# Patient Record
Sex: FEMALE | Race: WHITE | NOT HISPANIC OR LATINO | Employment: OTHER | URBAN - METROPOLITAN AREA
[De-identification: names, ages, dates, MRNs, and addresses within clinical notes are randomized per-mention and may not be internally consistent; named-entity substitution may affect disease eponyms.]

---

## 2017-01-11 ENCOUNTER — OFFICE VISIT (OUTPATIENT)
Dept: NEUROLOGY | Facility: CLINIC | Age: 62
End: 2017-01-11

## 2017-01-11 ENCOUNTER — TELEPHONE (OUTPATIENT)
Dept: NEUROLOGY | Facility: CLINIC | Age: 62
End: 2017-01-11

## 2017-01-11 VITALS
DIASTOLIC BLOOD PRESSURE: 82 MMHG | HEIGHT: 65 IN | WEIGHT: 200 LBS | SYSTOLIC BLOOD PRESSURE: 121 MMHG | BODY MASS INDEX: 33.32 KG/M2

## 2017-01-11 DIAGNOSIS — G31.84 MILD COGNITIVE IMPAIRMENT: Primary | ICD-10-CM

## 2017-01-11 PROCEDURE — 99205 OFFICE O/P NEW HI 60 MIN: CPT | Performed by: PSYCHIATRY & NEUROLOGY

## 2017-01-11 RX ORDER — CITALOPRAM 10 MG/1
10 TABLET ORAL DAILY
COMMUNITY
Start: 2016-12-20 | End: 2018-01-09 | Stop reason: SDUPTHER

## 2017-01-11 RX ORDER — LOVASTATIN 40 MG/1
40 TABLET ORAL NIGHTLY
COMMUNITY
Start: 2016-11-09 | End: 2018-04-16 | Stop reason: SDUPTHER

## 2017-01-11 RX ORDER — LANOLIN ALCOHOL/MO/W.PET/CERES
1000 CREAM (GRAM) TOPICAL DAILY
COMMUNITY
End: 2017-08-17

## 2017-01-11 RX ORDER — LEVOTHYROXINE SODIUM 0.05 MG/1
50 TABLET ORAL DAILY
COMMUNITY
End: 2017-08-17

## 2017-01-11 RX ORDER — DONEPEZIL HYDROCHLORIDE 5 MG/1
5 TABLET, FILM COATED ORAL DAILY
Qty: 30 TABLET | Refills: 11 | Status: SHIPPED | OUTPATIENT
Start: 2017-01-11 | End: 2017-02-13

## 2017-01-11 RX ORDER — FAMCICLOVIR 125 MG/1
125 TABLET ORAL DAILY
COMMUNITY
End: 2017-08-17

## 2017-01-11 RX ORDER — LOSARTAN POTASSIUM 100 MG/1
100 TABLET ORAL DAILY
COMMUNITY
End: 2018-02-05 | Stop reason: SDUPTHER

## 2017-01-11 NOTE — TELEPHONE ENCOUNTER
Met  And Mrs. Gomez during their visit with Dr. Jean-Baptiste today.  And Mrs. Gomez have bene  for 44 years, they live in [place] and have 2 grown sons (one in [place] and one in [place]). Each son has 2 kids. 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 They are both active in their [employer],  is  and stays quite busy -he is retired from his career. They said they hoped to have enjoyed group home but both having to have cared for parents and staying busy with [employer] they haven't found much time to spend together. We discussed some setting goals for finding this quality time. I explained my services and that we will work together in an ongoing fashion.

## 2017-01-11 NOTE — PROGRESS NOTES
"Subjective     Cassie Gomez is seen today in consultation at the request of Dr. Enciso for memory loss.    CC: memory loss    History of Present Illness   Cassie Gomez is a 61 y.o. female who comes to clinic today for evaluation of memory impairment. Her  has noted symptoms since approximately mid-2016 marked largely by forgetfulness. This has gradually worsened  over time. Additional associated symptoms have included impairments in orientation  and executive function. She has had no associated  symptoms of BPSD, though she has a history of depression. There are no other associated symptoms or modifying factors. Her family  denies  impairments in ADL's. She is currently residing at home with her .     Prior evaluation has included screening blood work  and an MRI of the brain which were unremarkable .       The following portions of the patient's history were reviewed and updated as appropriate: allergies, current medications, past family history, past medical history, past social history, past surgical history and problem list.    Review of Systems   Constitutional: Negative.    Respiratory: Negative.    Cardiovascular: Negative.    Gastrointestinal: Negative.    Genitourinary: Negative.    Psychiatric/Behavioral: Negative.    All other systems reviewed and are negative.      Objective   General appearance today is normal.   Peripheral pulses were present and symmetric.   The ophthalmoscopic exam today is unremarkable. The discs and posterior elements are unremarkable.    Visit Vitals   • /82   • Ht 65\" (165.1 cm)   • Wt 200 lb (90.7 kg)   • BMI 33.28 kg/m2       Physical Exam   Neurological: She has normal strength. She has a normal Finger-Nose-Finger Test. Gait normal.   Reflex Scores:       Tricep reflexes are 2+ on the right side and 2+ on the left side.       Bicep reflexes are 2+ on the right side and 2+ on the left side.       Brachioradialis reflexes are 2+ on the right side " and 2+ on the left side.       Patellar reflexes are 2+ on the right side and 2+ on the left side.       Achilles reflexes are 2+ on the right side and 2+ on the left side.  Psychiatric: Her speech is normal.        Neurologic Exam     Mental Status   Oriented to person.   Disoriented to place.   Disoriented to time.   Registration: recalls 3 of 3 objects. Recall at 5 minutes: recalls 1 of 3 objects. Follows 3 step commands.   Attention: normal. Concentration: normal.   Speech: speech is normal   Level of consciousness: alert  Knowledge: good.   Able to name object. Able to read. Able to repeat. Able to write. Normal comprehension.     Cranial Nerves   Cranial nerves II through XII intact.     Motor Exam   Muscle bulk: normal  Overall muscle tone: normal    Strength   Strength 5/5 throughout.     Sensory Exam   Light touch normal.     Gait, Coordination, and Reflexes     Gait  Gait: normal    Coordination   Finger to nose coordination: normal    Reflexes   Right brachioradialis: 2+  Left brachioradialis: 2+  Right biceps: 2+  Left biceps: 2+  Right triceps: 2+  Left triceps: 2+  Right patellar: 2+  Left patellar: 2+  Right achilles: 2+  Left achilles: 2+  Right : 2+  Left : 2+      MMSE=20      Assessment/Plan   Cassie was seen today for memory loss.    Diagnoses and all orders for this visit:    Mild cognitive impairment    Other orders  -     donepezil (ARICEPT) 5 MG tablet; Take 1 tablet by mouth Daily.        DISCUSSION/SUMMARY    Cassie Gomez comes to clinic today for evaluation of memory impairment. Her history and examination, including bedside cognitive testing are most consistent with Mild Cognitive Impairment (MCI) , which was discussed. Her examination would suggest an underlying dementia, though her history is not compelling for ADL impairment, and I believe her bedside testing may underestimate her true ability. Her laboratory and radiological workup is complete unrevealing. After  discussing potential treatment options, it was elected to add  donepezil. She will then follow up in 1 month, or sooner if needed.     As part of this visit I reviewed prior lab results, reviewed radiology results, obtained additional history from the family which is incorporated in the HPI and records from Dr. Enciso. Please see above for additional details.

## 2017-01-11 NOTE — LETTER
January 11, 2017     Erick Enciso MD  [address] [place] [id number]    Patient: Cassie Gomez   YOB: 1955   Date of Visit: 1/11/2017       Dear Dr. Zaria MD:    Thank you for referring Cassie Gomez to me for evaluation. Below are the relevant portions of my assessment and plan of care.      Cassie was seen today for memory loss.    Diagnoses and all orders for this visit:    Mild cognitive impairment    Other orders  -     donepezil (ARICEPT) 5 MG tablet; Take 1 tablet by mouth Daily.        DISCUSSION/SUMMARY    Cassie Gomez comes to clinic today for evaluation of memory impairment. Her history and examination, including bedside cognitive testing are most consistent with Mild Cognitive Impairment (MCI) , which was discussed. Her examination would suggest an underlying dementia, though her history is not compelling for ADL impairment, and I believe her bedside testing may underestimate her true ability. Her laboratory and radiological workup is complete unrevealing. After discussing potential treatment options, it was elected to add  donepezil. She will then follow up in 1 month, or sooner if needed.     As part of this visit I reviewed prior lab results, reviewed radiology results, obtained additional history from the family which is incorporated in the HPI and records from Dr. Enciso. Please see above for additional details.                If you have questions, please do not hesitate to call me. I look forward to following Cassie along with you.         Sincerely,        Lew Jean-Baptiste MD        CC: No Recipients

## 2017-02-13 ENCOUNTER — OFFICE VISIT (OUTPATIENT)
Dept: NEUROLOGY | Facility: CLINIC | Age: 62
End: 2017-02-13

## 2017-02-13 VITALS
BODY MASS INDEX: 31.39 KG/M2 | SYSTOLIC BLOOD PRESSURE: 138 MMHG | DIASTOLIC BLOOD PRESSURE: 80 MMHG | WEIGHT: 200 LBS | HEIGHT: 67 IN

## 2017-02-13 DIAGNOSIS — G31.84 MILD COGNITIVE IMPAIRMENT: Primary | ICD-10-CM

## 2017-02-13 PROCEDURE — 99213 OFFICE O/P EST LOW 20 MIN: CPT | Performed by: PHYSICIAN ASSISTANT

## 2017-02-13 RX ORDER — MEMANTINE HYDROCHLORIDE 10 MG/1
10 TABLET ORAL 2 TIMES DAILY
Qty: 60 TABLET | Refills: 11 | Status: SHIPPED | OUTPATIENT
Start: 2017-02-13 | End: 2018-01-09 | Stop reason: SDUPTHER

## 2017-02-13 RX ORDER — DONEPEZIL HYDROCHLORIDE 10 MG/1
10 TABLET, FILM COATED ORAL DAILY
Qty: 30 TABLET | Refills: 11 | Status: SHIPPED | OUTPATIENT
Start: 2017-02-13 | End: 2018-01-09 | Stop reason: SDUPTHER

## 2017-02-13 RX ORDER — MEMANTINE HYDROCHLORIDE 5 MG-10 MG
KIT ORAL
Qty: 1 PACKAGE | Refills: 0 | Status: SHIPPED | OUTPATIENT
Start: 2017-02-13 | End: 2017-07-06

## 2017-02-13 NOTE — PROGRESS NOTES
"Subjective     History of Present Illness   Cassie Gomez is a 62 y.o. female who returns to clinic today for evaluation of cognitive impairment. Her  has noted symptoms since approximately mid-2016 marked largely by forgetfulness. This has gradually worsened  over time. Additional associated symptoms have included impairments in orientation and executive function. She has had no associated  symptoms of BPSD, though she has a history of depression. There are no other associated symptoms or modifying factors. Her family denies  impairments in ADL's. She is currently residing at home with her .     Prior evaluation has included screening blood work  and an MRI of the brain which were unremarkable . She is currently taking donepezil 5mg daily.     Since her last visit in 1/17, she and her family feels that her cognition has slightly improved.       The following portions of the patient's history were reviewed and updated as appropriate: allergies, current medications, past family history, past medical history, past social history, past surgical history and problem list.    Review of Systems   Constitutional: Negative.    HENT: Negative.    Eyes: Negative.    Respiratory: Negative.    Cardiovascular: Negative.    Gastrointestinal: Negative.    Endocrine: Negative.    Genitourinary: Negative.    Musculoskeletal: Negative.    Skin: Negative.    Allergic/Immunologic: Negative.    Neurological:        As noted in HPI   Hematological: Negative.    Psychiatric/Behavioral: Negative.        Objective     Visit Vitals   • /80   • Ht 67\" (170.2 cm)   • Wt 200 lb (90.7 kg)   • BMI 31.32 kg/m2       General appearance today is normal.       Physical Exam   Constitutional: She is oriented to person, place, and time.   Neurological: She is oriented to person, place, and time. She has normal strength. She has a normal Finger-Nose-Finger Test.   Psychiatric: Her speech is normal.        Neurologic Exam "     Mental Status   Oriented to person, place, and time.   Registration: recalls 3 of 3 objects. Recall at 5 minutes: recalls 2 of 3 objects. Follows 3 step commands.   Attention: 2/5 sequencing.   Speech: speech is normal   Level of consciousness: alert  Able to name object. Able to read. Able to repeat. Able to write. Normal comprehension.     Cranial Nerves   Cranial nerves II through XII intact.     Motor Exam   Muscle bulk: normal  Overall muscle tone: normal    Strength   Strength 5/5 throughout.     Sensory Exam   Light touch normal.     Gait, Coordination, and Reflexes     Coordination   Finger to nose coordination: normal    Tremor   Resting tremor: absent        Results  MMSE=25 (20 in 1/17)       Assessment/Plan   Cassie was seen today for neurologic problem.    Diagnoses and all orders for this visit:    Mild cognitive impairment    Other orders  -     donepezil (ARICEPT) 10 MG tablet; Take 1 tablet by mouth Daily.  -     memantine (NAMENDA TITRATION MAYO) 5 (28)-10 (21) MG tablet pack; Follow package directions.  -     memantine (NAMENDA) 10 MG tablet; Take 1 tablet by mouth 2 (Two) Times a Day.          Discussion/Summary   Cassie Gomez returns to clinic today for evaluation of Mild Cognitive Impairment (MCI) . I again reviewed her current status and treatment options. After discussing potential treatment options, it was elected to increase  donepezil to 10mg daily and add memantine. I also recommended cognitive rehabilitation, which she will consider in the future. She will then follow up in 3 months , or sooner if needed.       I spent 15 minutes with the patient and family. I spent 80 percent of this time counseling and discussing diagnosis, prognosis, diagnostic testing, evaluation, current status, treatment options, management and clinical trials.    As part of this visit I obtained additional history from the family which is incorporated in the HPI.    Additionally, I gave the patient  educational materials including an overview packet and disease specific educational materials     Lavinia Randhawa PA-C

## 2017-02-15 ENCOUNTER — DOCUMENTATION (OUTPATIENT)
Dept: NEUROLOGY | Facility: CLINIC | Age: 62
End: 2017-02-15

## 2017-02-15 NOTE — PROGRESS NOTES
I called and spoke with pharmacist on file and he paid $24.00 for Donepezil, $7.15 for Memantine Titration PK and $7.15 for the Memantine, so I spoke with Rosy about it and she is going to speak with the pt's children

## 2017-05-17 ENCOUNTER — OFFICE VISIT (OUTPATIENT)
Dept: NEUROLOGY | Facility: CLINIC | Age: 62
End: 2017-05-17

## 2017-05-17 VITALS
SYSTOLIC BLOOD PRESSURE: 122 MMHG | WEIGHT: 190 LBS | BODY MASS INDEX: 28.79 KG/M2 | DIASTOLIC BLOOD PRESSURE: 80 MMHG | HEIGHT: 68 IN

## 2017-05-17 DIAGNOSIS — G31.84 MILD COGNITIVE IMPAIRMENT: Primary | ICD-10-CM

## 2017-05-17 PROCEDURE — 99214 OFFICE O/P EST MOD 30 MIN: CPT | Performed by: PSYCHIATRY & NEUROLOGY

## 2017-07-06 ENCOUNTER — OFFICE VISIT (OUTPATIENT)
Dept: FAMILY MEDICINE CLINIC | Facility: CLINIC | Age: 62
End: 2017-07-06

## 2017-07-06 VITALS
HEART RATE: 78 BPM | OXYGEN SATURATION: 98 % | DIASTOLIC BLOOD PRESSURE: 80 MMHG | BODY MASS INDEX: 29.1 KG/M2 | SYSTOLIC BLOOD PRESSURE: 118 MMHG | WEIGHT: 192 LBS | HEIGHT: 68 IN

## 2017-07-06 DIAGNOSIS — I10 ESSENTIAL HYPERTENSION: ICD-10-CM

## 2017-07-06 DIAGNOSIS — E78.49 OTHER HYPERLIPIDEMIA: ICD-10-CM

## 2017-07-06 DIAGNOSIS — E01.0 THYROMEGALY: ICD-10-CM

## 2017-07-06 DIAGNOSIS — E03.9 ACQUIRED HYPOTHYROIDISM: ICD-10-CM

## 2017-07-06 DIAGNOSIS — E11.9 TYPE 2 DIABETES MELLITUS WITHOUT COMPLICATION, WITHOUT LONG-TERM CURRENT USE OF INSULIN (HCC): Primary | ICD-10-CM

## 2017-07-06 PROBLEM — E78.5 HYPERLIPIDEMIA: Status: ACTIVE | Noted: 2017-07-06

## 2017-07-06 PROCEDURE — 99204 OFFICE O/P NEW MOD 45 MIN: CPT | Performed by: FAMILY MEDICINE

## 2017-07-06 NOTE — PROGRESS NOTES
Subjective   Cassie Gomez is a 62 y.o. female.     History of Present Illness  Ms. Gomez presents today as a new pt to establish care. She was previously followed by Dr. Buck at [facility]. He has retired and she is in need of new PCP. She has several chronic medical conditions for which she needs f/u.    Diabetes Mellitus Type II, Follow-up: Patient here for follow-up of Type 2 diabetes mellitus.  Current symptoms/problems include weight loss and have been stable.     Known diabetic complications: none  Cardiovascular risk factors: diabetes mellitus, dyslipidemia, hypertension and sedentary lifestyle  Current diabetic medications include oral agent (monotherapy): metformin.     Eye exam current (within one year): yes  Weight trend: fluctuating a bit  Prior visit with dietician: yes -    Current diet: well balanced  Current exercise: walking    Current monitoring regimen: home blood tests - daily  Home blood sugar records: <120 fasting  Any episodes of hypoglycemia? no    Is She on ACE inhibitor or angiotensin II receptor blocker?   Yes losartan (Cozaar)    Hypertension: Patient here for follow-up of elevated blood pressure. She is irregularly exercising and is fairly adherent to low salt diet.  Blood pressure is well controlled at home. Cardiac symptoms none. Patient denies chest pain, claudication, cough, dyspnea, exertional chest pressure/discomfort, irregular heart beat, lower extremity edema, palpitations and syncope.  Cardiovascular risk factors: as above. Use of agents associated with hypertension: thyroid hormones. History of target organ damage: none. Taking medication as rx'd.    Hyperlipidemia: Patient presents with hyperlipidemia.  She was tested because of comorbidities.  Her last labs unknown at this time. Cardiac symptoms as above. No new focal neuro symptoms.  There is a family history of hyperlipidemia. There is a family history of ischemic heart disease. Currently on statin and  "tolerating well.    Hypothyroidism: Patient presents for evaluation of thyroid function. Symptoms have previously consisted of fatigue, feeling cold and cold intolerance, anxiousness, change in skin,  nails, or hair, depression. Symptoms have present for several years. The symptoms were moderate.  The problem has been controlled.  Previous thyroid studies include TSH, triiodothyronine free and free thyroxine. The hypothyroidism is acquired.    She is followed by Dr. Jean-Baptiste in neurology for \"mild cognitive impairment\". She is currently on nemantine and donepezil.     The following portions of the patient's history were reviewed and updated as appropriate: allergies, current medications, past family history, past medical history, past social history, past surgical history and problem list.    Review of Systems   Constitutional: Positive for unexpected weight change. Negative for appetite change, fatigue and fever.   HENT: Negative for congestion, ear pain, hearing loss, nosebleeds, rhinorrhea, sneezing, sore throat, tinnitus and trouble swallowing.    Eyes: Negative for pain, discharge, redness and visual disturbance.   Respiratory: Negative for cough, chest tightness, shortness of breath and wheezing.    Cardiovascular: Negative for chest pain, palpitations and leg swelling.   Gastrointestinal: Negative for abdominal pain, blood in stool, constipation, diarrhea, nausea and vomiting.   Endocrine: Negative for cold intolerance, heat intolerance, polydipsia and polyuria.   Genitourinary: Negative for dysuria, flank pain, frequency, hematuria and urgency.   Musculoskeletal: Negative for arthralgias, back pain, joint swelling, myalgias and neck pain.   Skin: Negative for rash and wound.   Allergic/Immunologic: Negative for food allergies.   Neurological: Negative for dizziness, tremors, seizures, syncope, speech difficulty, weakness, numbness and headaches.   Hematological: Negative for adenopathy. Does not bruise/bleed " easily.   Psychiatric/Behavioral: Negative for confusion, dysphoric mood, sleep disturbance and suicidal ideas. The patient is not nervous/anxious.        Objective    Vitals:    07/06/17 1001   BP: 118/80   Pulse: 78   SpO2: 98%     Body mass index is 29.19 kg/(m^2).  Last 2 weights    07/06/17  1001   Weight: 192 lb (87.1 kg)       Physical Exam   Constitutional: She is oriented to person, place, and time. She appears well-developed and well-nourished. She is cooperative. She does not appear ill. No distress.   HENT:   Head: Normocephalic and atraumatic.   Right Ear: Tympanic membrane, external ear and ear canal normal.   Left Ear: Tympanic membrane, external ear and ear canal normal.   Nose: Nose normal. No mucosal edema or rhinorrhea.   Mouth/Throat: Oropharynx is clear and moist and mucous membranes are normal. No oral lesions. No posterior oropharyngeal erythema. No tonsillar exudate.   Eyes: Conjunctivae, EOM and lids are normal.   Neck: Phonation normal. Neck supple. Normal carotid pulses present. Carotid bruit is not present. Thyromegaly (right lobe larger than left) present.   Cardiovascular: Normal rate, regular rhythm, S1 normal, S2 normal and intact distal pulses.    Pulmonary/Chest: Effort normal and breath sounds normal.   Abdominal: Soft. Bowel sounds are normal. She exhibits no distension and no mass. There is no hepatosplenomegaly. There is no tenderness.   Musculoskeletal: Normal range of motion. She exhibits no edema, tenderness or deformity.       Vascular Status -  Her exam exhibits no right foot edema. Her exam exhibits no left foot edema.  Lymphadenopathy:     She has no cervical adenopathy.        Right: No supraclavicular adenopathy present.        Left: No supraclavicular adenopathy present.   Neurological: She is alert and oriented to person, place, and time. She has normal strength. She displays no tremor. No cranial nerve deficit or sensory deficit. Gait normal.   Skin: Skin is warm and  dry. No ecchymosis and no rash noted. There is pallor.   Psychiatric: Her speech is normal and behavior is normal. Thought content normal. Her affect is blunt.   Nursing note and vitals reviewed.      Assessment/Plan   Cassie was seen today for establish care.    Diagnoses and all orders for this visit:    Type 2 diabetes mellitus without complication, without long-term current use of insulin  Comments:  DM historically controlled. Continue current meds. Reassess A1c and adjust tx as indicated. Diabetic appropriate diet and regular exercise advised.  Orders:  -     CBC (No Diff)  -     Comprehensive Metabolic Panel  -     Hemoglobin A1c    Acquired hypothyroidism  Comments:  Unknown status. Check TSH and adjust replacement as indicated.  Orders:  -     Comprehensive Metabolic Panel  -     TSH  -     T4, Free    Essential hypertension  Comments:  BP controlled. On ARB. Low salt diet and regular exercise advised.  Orders:  -     CBC (No Diff)  -     Comprehensive Metabolic Panel    Other hyperlipidemia  Comments:  Unknown status and pt not fasting today. Continue statin.   Orders:  -     Comprehensive Metabolic Panel    Thyromegaly  Comments:  Review record. Asseses function. May need thyroid US. Denies dysphagia or sore throat.    I will contact patient regarding test results and provide instructions regarding any necessary changes in plan of care.  Records requested from previous primary provider as well as any consulting physician, admitting hospitals, etc. Further recommendations pending review.  Patient was encouraged to keep me informed of any acute changes, or any new concerning symptoms.  Routine f/u in 3 month, sooner as needed/instructed.  Patient voiced understanding of all instructions and denied further questions.

## 2017-07-07 LAB
ALBUMIN SERPL-MCNC: 4.8 G/DL (ref 3.5–5)
ALBUMIN/GLOB SERPL: 1.6 G/DL (ref 1–2)
ALP SERPL-CCNC: 104 U/L (ref 38–126)
ALT SERPL-CCNC: 23 U/L (ref 13–69)
AST SERPL-CCNC: 24 U/L (ref 15–46)
BILIRUB SERPL-MCNC: 0.5 MG/DL (ref 0.2–1.3)
BUN SERPL-MCNC: 12 MG/DL (ref 7–20)
BUN/CREAT SERPL: 15 (ref 7.1–23.5)
CALCIUM SERPL-MCNC: 10.4 MG/DL (ref 8.4–10.2)
CHLORIDE SERPL-SCNC: 101 MMOL/L (ref 98–107)
CO2 SERPL-SCNC: 26 MMOL/L (ref 26–30)
CREAT SERPL-MCNC: 0.8 MG/DL (ref 0.6–1.3)
ERYTHROCYTE [DISTWIDTH] IN BLOOD BY AUTOMATED COUNT: 15 % (ref 11.5–14.5)
GLOBULIN SER CALC-MCNC: 3 GM/DL
GLUCOSE SERPL-MCNC: 104 MG/DL (ref 74–98)
HBA1C MFR BLD: 6.5 %
HCT VFR BLD AUTO: 46.7 % (ref 37–47)
HGB BLD-MCNC: 14.2 G/DL (ref 12–16)
MCH RBC QN AUTO: 27.8 PG (ref 27–31)
MCHC RBC AUTO-ENTMCNC: 30.4 G/DL (ref 30–37)
MCV RBC AUTO: 91.4 FL (ref 81–99)
PLATELET # BLD AUTO: 360 10*3/MM3 (ref 130–400)
POTASSIUM SERPL-SCNC: 4.9 MMOL/L (ref 3.5–5.1)
PROT SERPL-MCNC: 7.8 G/DL (ref 6.3–8.2)
RBC # BLD AUTO: 5.11 10*6/MM3 (ref 4.2–5.4)
SODIUM SERPL-SCNC: 143 MMOL/L (ref 137–145)
T4 FREE SERPL-MCNC: 1.17 NG/DL (ref 0.78–2.19)
TSH SERPL DL<=0.005 MIU/L-ACNC: 1.36 MIU/ML (ref 0.47–4.68)
WBC # BLD AUTO: 10.73 10*3/MM3 (ref 4.8–10.8)

## 2017-08-17 ENCOUNTER — OFFICE VISIT (OUTPATIENT)
Dept: NEUROLOGY | Facility: CLINIC | Age: 62
End: 2017-08-17

## 2017-08-17 VITALS — HEIGHT: 68 IN | WEIGHT: 192 LBS | BODY MASS INDEX: 29.1 KG/M2

## 2017-08-17 DIAGNOSIS — G31.84 MILD COGNITIVE IMPAIRMENT: Primary | ICD-10-CM

## 2017-08-17 PROCEDURE — 99213 OFFICE O/P EST LOW 20 MIN: CPT | Performed by: PHYSICIAN ASSISTANT

## 2017-08-17 NOTE — PROGRESS NOTES
"Subjective     History of Present Illness   Cassie Gomez is a 62 y.o. female who returns to clinic today for evaluation of Mild Cognitive Impairment (MCI) . Her  has noted symptoms since approximately mid-2016 marked largely by forgetfulness. This has gradually worsened over time. Additional associated symptoms have included impairments in orientation and executive function. She has had no associated symptoms of BPSD, though she has a history of depression. There are no other associated symptoms or modifying factors. Her family denies impairments in ADL's. She is currently residing at home with her .      Prior evaluation has included screening blood work  and an MRI of the brain which were unremarkable. She is currently taking donepezil 10 mg and memantine.    Since her last visit in 5/17, she feels essentially unchanged cognitively and her family agrees.       The following portions of the patient's history were reviewed and updated as appropriate: allergies, current medications, past family history, past medical history, past social history, past surgical history and problem list.    Review of Systems   Constitutional: Negative.    HENT: Negative.    Eyes: Negative.    Respiratory: Negative.    Cardiovascular: Negative.    Gastrointestinal: Negative.    Endocrine: Negative.    Genitourinary: Negative.    Musculoskeletal: Negative.    Skin: Negative.    Allergic/Immunologic: Negative.    Neurological:        As noted in HPI   Hematological: Negative.    Psychiatric/Behavioral: Negative.        Objective     Ht 68\" (172.7 cm)  Wt 192 lb (87.1 kg)  BMI 29.19 kg/m2    General appearance today is normal.       Physical Exam   Neurological: She has normal strength. She has a normal Finger-Nose-Finger Test.   Psychiatric: Her speech is normal.        Neurologic Exam     Mental Status   Oriented to place.   Disoriented to date and day. Oriented to year, month and season.   Registration: recalls 3 of " 3 objects. Recall at 5 minutes: recalls 2 of 3 objects. Follows 3 step commands.   Attention: 3/5 sequencing.   Speech: speech is normal   Level of consciousness: alert  Able to name object. Able to read. Able to repeat. Able to write. Normal comprehension.     Cranial Nerves   Cranial nerves II through XII intact.     Motor Exam   Muscle bulk: normal  Overall muscle tone: normal    Strength   Strength 5/5 throughout.     Sensory Exam   Light touch normal.     Gait, Coordination, and Reflexes     Coordination   Finger to nose coordination: normal    Tremor   Resting tremor: absent        Results  MMSE=24 (27 in 5/17) (20 in 1/17)       Assessment/Plan   Cassie was seen today for memory loss.    Diagnoses and all orders for this visit:    Mild cognitive impairment          Discussion/Summary   Cassie Gomez returns to clinic today for evaluation of Mild Cognitive Impairment (MCI) . I again reviewed her current status and treatment options. After discussing potential treatment options, it was elected to continue on  donepezil and memantine unchanged. I recommended cognitive rehabilitation, which she will consider in the future. I also discussed potential clinical trials. She will then follow up in 6 months, or sooner if needed.       I spent 15 minutes out of 20 minutes face to face with the patient and family and discussing diagnosis, evaluation, current status, treatment options and management.    As part of this visit I obtained additional history from the family which is incorporated in the HPI.      Lavinia Randhawa PA-C

## 2017-09-25 PROBLEM — E53.8 VITAMIN B 12 DEFICIENCY: Status: ACTIVE | Noted: 2017-09-25

## 2017-09-25 PROBLEM — K58.2 IRRITABLE BOWEL SYNDROME WITH BOTH CONSTIPATION AND DIARRHEA: Status: ACTIVE | Noted: 2017-09-25

## 2017-10-04 ENCOUNTER — OFFICE VISIT (OUTPATIENT)
Dept: FAMILY MEDICINE CLINIC | Facility: CLINIC | Age: 62
End: 2017-10-04

## 2017-10-04 VITALS
SYSTOLIC BLOOD PRESSURE: 112 MMHG | WEIGHT: 203 LBS | DIASTOLIC BLOOD PRESSURE: 80 MMHG | HEIGHT: 68 IN | OXYGEN SATURATION: 98 % | BODY MASS INDEX: 30.77 KG/M2 | HEART RATE: 79 BPM

## 2017-10-04 DIAGNOSIS — E78.49 OTHER HYPERLIPIDEMIA: ICD-10-CM

## 2017-10-04 DIAGNOSIS — E03.9 ACQUIRED HYPOTHYROIDISM: ICD-10-CM

## 2017-10-04 DIAGNOSIS — E11.9 DIABETES MELLITUS, STABLE (HCC): Primary | ICD-10-CM

## 2017-10-04 DIAGNOSIS — E55.9 VITAMIN D DEFICIENCY: ICD-10-CM

## 2017-10-04 DIAGNOSIS — E53.8 VITAMIN B 12 DEFICIENCY: ICD-10-CM

## 2017-10-04 DIAGNOSIS — K58.2 IRRITABLE BOWEL SYNDROME WITH BOTH CONSTIPATION AND DIARRHEA: ICD-10-CM

## 2017-10-04 LAB — GLUCOSE BLDC GLUCOMTR-MCNC: 203 MG/DL (ref 70–130)

## 2017-10-04 PROCEDURE — 82962 GLUCOSE BLOOD TEST: CPT | Performed by: FAMILY MEDICINE

## 2017-10-04 PROCEDURE — 99214 OFFICE O/P EST MOD 30 MIN: CPT | Performed by: FAMILY MEDICINE

## 2017-10-04 NOTE — PROGRESS NOTES
Subjective   Cassie Gomez is a 62 y.o. female.     History of Present Illness  Ms. Gomez presents today for routine f/u.     Diabetes Mellitus Type II, Follow-up: Patient here for follow-up of Type 2 diabetes mellitus.  Current symptoms/problems include weight loss and have been stable.      Known diabetic complications: none  Cardiovascular risk factors: diabetes mellitus, dyslipidemia, hypertension and sedentary lifestyle  Current diabetic medications include oral agent (monotherapy): metformin.      Eye exam current (within one year): yes  Weight trend: fluctuating a bit  Prior visit with dietician: yes -    Current diet: well balanced  Current exercise: walking     Current monitoring regimen: home blood tests - rarely  Home blood sugar records: unknown  Any episodes of hypoglycemia? no     Is She on ACE inhibitor or angiotensin II receptor blocker?   Yes losartan (Cozaar)     Hypertension: Patient here for follow-up of elevated blood pressure. She is irregularly exercising and is fairly adherent to low salt diet.  Blood pressure not checked at home. Cardiac symptoms none. Patient denies chest pain, claudication, cough, dyspnea, exertional chest pressure/discomfort, irregular heart beat, lower extremity edema, palpitations and syncope.  Cardiovascular risk factors: as above. Use of agents associated with hypertension: thyroid hormones. History of target organ damage: none. Taking medication as rx'd. Notably pt does not recall being told she has HTN and was not aware of this dx. She has been taking her Losartan.     Hyperlipidemia: Patient presents with hyperlipidemia.  She was tested because of comorbidities.  Her last labs unknown at this time. Cardiac symptoms as above. No new focal neuro symptoms.  There is a family history of hyperlipidemia. There is a family history of ischemic heart disease. Currently on statin and tolerating well.     Hypothyroidism: Patient presents for evaluation of thyroid function.  "Symptoms have previously consisted of fatigue, feeling cold and cold intolerance, anxiousness, change in skin,  nails, or hair, depression. Symptoms have present for several years. The symptoms were moderate.  The problem has been controlled.  Previous thyroid studies include TSH, triiodothyronine free and free thyroxine. The hypothyroidism is acquired.     She is followed by Dr. Jean-Baptiste in neurology for \"mild cognitive impairment\". She is currently on nemantine and donepezil. Has f/u after first of year.    The following portions of the patient's history were reviewed and updated as appropriate: allergies, current medications, past family history, past medical history, past social history, past surgical history and problem list.    Review of Systems   Constitutional: Positive for unexpected weight change. Negative for appetite change, fatigue and fever.   HENT: Negative for congestion, ear pain, hearing loss, nosebleeds, rhinorrhea, sneezing, sore throat, tinnitus and trouble swallowing.    Eyes: Negative for pain, discharge, redness and visual disturbance.   Respiratory: Negative for cough, chest tightness, shortness of breath and wheezing.    Cardiovascular: Negative for chest pain, palpitations and leg swelling.   Gastrointestinal: Negative for abdominal pain, blood in stool, constipation, diarrhea, nausea and vomiting.   Endocrine: Negative for cold intolerance, heat intolerance, polydipsia and polyuria.   Genitourinary: Negative for dysuria, flank pain, frequency, hematuria and urgency.   Musculoskeletal: Negative for arthralgias, back pain, joint swelling, myalgias and neck pain.   Skin: Negative for rash and wound.   Neurological: Negative for dizziness, tremors, seizures, syncope, speech difficulty, weakness, numbness and headaches.   Hematological: Negative for adenopathy. Does not bruise/bleed easily.   Psychiatric/Behavioral: Negative for confusion, dysphoric mood, sleep disturbance and suicidal ideas. " The patient is not nervous/anxious.        Objective    Vitals:    10/04/17 0915   BP: 112/80   Pulse: 79   SpO2: 98%     Body mass index is 30.87 kg/(m^2).  Last 2 weights    10/04/17  0915   Weight: 203 lb (92.1 kg)     Physical Exam   Constitutional: She is oriented to person, place, and time. She appears well-developed and well-nourished. She is cooperative. She does not appear ill. No distress.   HENT:   Head: Normocephalic and atraumatic.   Mouth/Throat: Mucous membranes are normal. Mucous membranes are not dry.   Eyes: Conjunctivae and lids are normal.   Neck: Phonation normal. Neck supple. Normal carotid pulses present. Thyromegaly (right lobe larger than left) present.   Cardiovascular: Normal rate, regular rhythm, S1 normal, S2 normal and intact distal pulses.    Pulmonary/Chest: Effort normal and breath sounds normal.   Musculoskeletal: She exhibits no edema, tenderness or deformity.       Vascular Status -  Her exam exhibits no right foot edema. Her exam exhibits no left foot edema.  Neurological: She is alert and oriented to person, place, and time. She has normal strength. She displays no tremor. No cranial nerve deficit or sensory deficit. Gait normal.   Skin: Skin is warm and dry. No ecchymosis and no rash noted. There is pallor.   Psychiatric: Her speech is normal. Thought content normal. Her affect is blunt.   Nursing note and vitals reviewed.    Recent Results (from the past 2184 hour(s))   CBC (No Diff)    Collection Time: 07/06/17 10:45 AM   Result Value Ref Range    WBC 10.73 4.80 - 10.80 10*3/mm3    RBC 5.11 4.20 - 5.40 10*6/mm3    Hemoglobin 14.2 12.0 - 16.0 g/dL    Hematocrit 46.7 37.0 - 47.0 %    MCV 91.4 81.0 - 99.0 fL    MCH 27.8 27.0 - 31.0 pg    MCHC 30.4 30.0 - 37.0 g/dL    RDW 15.0 (H) 11.5 - 14.5 %    Platelets 360 130 - 400 10*3/mm3   Comprehensive Metabolic Panel    Collection Time: 07/06/17 10:45 AM   Result Value Ref Range    Glucose 104 (H) 74 - 98 mg/dL    BUN 12 7 - 20 mg/dL     Creatinine 0.80 0.60 - 1.30 mg/dL    eGFR Non African Am 72 >60 mL/min/1.73    eGFR African Am 88 >60 mL/min/1.73    BUN/Creatinine Ratio 15.0 7.1 - 23.5    Sodium 143 137 - 145 mmol/L    Potassium 4.9 3.5 - 5.1 mmol/L    Chloride 101 98 - 107 mmol/L    Total CO2 26.0 26.0 - 30.0 mmol/L    Calcium 10.4 (H) 8.4 - 10.2 mg/dL    Total Protein 7.8 6.3 - 8.2 g/dL    Albumin 4.80 3.50 - 5.00 g/dL    Globulin 3.0 gm/dL    A/G Ratio 1.6 1.0 - 2.0 g/dL    Total Bilirubin 0.5 0.2 - 1.3 mg/dL    Alkaline Phosphatase 104 38 - 126 U/L    AST (SGOT) 24 15 - 46 U/L    ALT (SGPT) 23 13 - 69 U/L   Hemoglobin A1c    Collection Time: 07/06/17 10:45 AM   Result Value Ref Range    Hemoglobin A1C 6.50 %   TSH    Collection Time: 07/06/17 10:45 AM   Result Value Ref Range    TSH 1.360 0.470 - 4.680 mIU/mL   T4, Free    Collection Time: 07/06/17 10:45 AM   Result Value Ref Range    Free T4 1.17 0.78 - 2.19 ng/dL   POC Glucose Fingerstick    Collection Time: 10/04/17  9:34 AM   Result Value Ref Range    Glucose 203 (A) 70 - 130 mg/dL     Assessment/Plan   Cassie was seen today for diabetes, hyperlipidemia and hypothyroidism.    Diagnoses and all orders for this visit:    Diabetes mellitus, stable  -     POC Glucose Fingerstick  -     Microalbumin / Creatinine Urine Ratio - Urine, Clean Catch  -     Comprehensive Metabolic Panel; Future    Other hyperlipidemia  -     Comprehensive Metabolic Panel; Future  -     Lipid Panel; Future    Irritable bowel syndrome with both constipation and diarrhea    Vitamin B 12 deficiency  -     Vitamin B12; Future    Acquired hypothyroidism    Vitamin D deficiency  -     Vitamin D 25 Hydroxy; Future    Other orders  -     Tdap (BOOSTRIX) 5-2.5-18.5 LF-MCG/0.5 injection; Inject 0.5 mL into the shoulder, thigh, or buttocks 1 (One) Time for 1 dose.    NIDDM historically controlled. As it has not quite been 12 weeks since last A1c will wait until prior to next visit.  HLP of unknown status; currently on  mevacor.  HTN according to med list/record but not according to pt. Wean down to 50 mg daily, drop by for BP recheck in 4 weeks. And adjust accordingly.  Appears euthyroid other than weight gain. Continue current dose.  Pt advised to eat a heart healthy diet and get regular aerobic exercise.  Patient was encouraged to keep me informed of any acute changes, or any new concerning symptoms.  Routine f/u in 3-4 months with fasting labs prior.  FU sooner as needed.  Pt is aware of reasons to seek emergent care.  Patient voiced understanding of all instructions and denied further questions.

## 2017-10-05 LAB
ALBUMIN/CREAT UR: 3.9 MG/G CREAT (ref 0–30)
CREAT UR-MCNC: 139.4 MG/DL
MICROALBUMIN UR-MCNC: 5.4 UG/ML

## 2017-10-09 ENCOUNTER — RESULTS ENCOUNTER (OUTPATIENT)
Dept: FAMILY MEDICINE CLINIC | Facility: CLINIC | Age: 62
End: 2017-10-09

## 2017-10-09 DIAGNOSIS — E53.8 VITAMIN B 12 DEFICIENCY: ICD-10-CM

## 2017-10-09 DIAGNOSIS — E78.49 OTHER HYPERLIPIDEMIA: ICD-10-CM

## 2017-10-09 DIAGNOSIS — E11.9 DIABETES MELLITUS, STABLE (HCC): ICD-10-CM

## 2017-10-09 DIAGNOSIS — E55.9 VITAMIN D DEFICIENCY: ICD-10-CM

## 2017-10-30 ENCOUNTER — TRANSCRIBE ORDERS (OUTPATIENT)
Dept: MAMMOGRAPHY | Facility: HOSPITAL | Age: 62
End: 2017-10-30

## 2017-10-30 DIAGNOSIS — Z12.39 SCREENING BREAST EXAMINATION: Primary | ICD-10-CM

## 2017-11-15 ENCOUNTER — TELEPHONE (OUTPATIENT)
Dept: NEUROLOGY | Facility: CLINIC | Age: 62
End: 2017-11-15

## 2017-11-15 NOTE — TELEPHONE ENCOUNTER
Called to follow-up on interest in clinical trials.  Mr. Gomez advised they are not interested at this time.

## 2017-12-01 ENCOUNTER — HOSPITAL ENCOUNTER (OUTPATIENT)
Dept: MAMMOGRAPHY | Facility: HOSPITAL | Age: 62
Discharge: HOME | End: 2017-12-01
Admitting: FAMILY MEDICINE

## 2017-12-01 DIAGNOSIS — Z12.39 SCREENING BREAST EXAMINATION: ICD-10-CM

## 2017-12-01 PROCEDURE — G0202 SCR MAMMO BI INCL CAD: HCPCS

## 2017-12-01 PROCEDURE — 77063 BREAST TOMOSYNTHESIS BI: CPT

## 2018-01-09 RX ORDER — CITALOPRAM 10 MG/1
TABLET ORAL
Qty: 90 TABLET | Refills: 2 | Status: SHIPPED | OUTPATIENT
Start: 2018-01-09 | End: 2018-01-16 | Stop reason: SDUPTHER

## 2018-01-09 RX ORDER — DONEPEZIL HYDROCHLORIDE 10 MG/1
10 TABLET, FILM COATED ORAL DAILY
Qty: 90 TABLET | Refills: 3 | Status: SHIPPED | OUTPATIENT
Start: 2018-01-09 | End: 2018-11-20 | Stop reason: SDUPTHER

## 2018-01-09 RX ORDER — MEMANTINE HYDROCHLORIDE 10 MG/1
10 TABLET ORAL 2 TIMES DAILY
Qty: 180 TABLET | Refills: 3 | Status: SHIPPED | OUTPATIENT
Start: 2018-01-09 | End: 2018-05-24

## 2018-01-16 RX ORDER — CITALOPRAM 10 MG/1
TABLET ORAL
Qty: 90 TABLET | Refills: 2 | Status: SHIPPED | OUTPATIENT
Start: 2018-01-16 | End: 2018-10-22 | Stop reason: SDUPTHER

## 2018-02-05 ENCOUNTER — OFFICE VISIT (OUTPATIENT)
Dept: FAMILY MEDICINE CLINIC | Facility: CLINIC | Age: 63
End: 2018-02-05

## 2018-02-05 VITALS
DIASTOLIC BLOOD PRESSURE: 78 MMHG | HEIGHT: 68 IN | BODY MASS INDEX: 30.62 KG/M2 | HEART RATE: 75 BPM | WEIGHT: 202 LBS | OXYGEN SATURATION: 98 % | SYSTOLIC BLOOD PRESSURE: 124 MMHG

## 2018-02-05 DIAGNOSIS — E78.49 OTHER HYPERLIPIDEMIA: ICD-10-CM

## 2018-02-05 DIAGNOSIS — E11.9 TYPE 2 DIABETES MELLITUS WITHOUT COMPLICATION, WITHOUT LONG-TERM CURRENT USE OF INSULIN (HCC): Primary | ICD-10-CM

## 2018-02-05 DIAGNOSIS — E03.9 ACQUIRED HYPOTHYROIDISM: ICD-10-CM

## 2018-02-05 DIAGNOSIS — E55.9 VITAMIN D DEFICIENCY: ICD-10-CM

## 2018-02-05 DIAGNOSIS — E53.8 VITAMIN B 12 DEFICIENCY: ICD-10-CM

## 2018-02-05 LAB — GLUCOSE BLDC GLUCOMTR-MCNC: 235 MG/DL (ref 70–130)

## 2018-02-05 PROCEDURE — 82962 GLUCOSE BLOOD TEST: CPT | Performed by: FAMILY MEDICINE

## 2018-02-05 PROCEDURE — 99214 OFFICE O/P EST MOD 30 MIN: CPT | Performed by: FAMILY MEDICINE

## 2018-02-05 RX ORDER — LEVOTHYROXINE SODIUM 0.05 MG/1
TABLET ORAL
COMMUNITY
Start: 2016-12-13 | End: 2018-06-19 | Stop reason: SDUPTHER

## 2018-02-05 RX ORDER — CYANOCOBALAMIN (VITAMIN B-12) 1000 MCG
TABLET, SUBLINGUAL SUBLINGUAL
COMMUNITY
Start: 2016-12-27

## 2018-02-05 RX ORDER — LOSARTAN POTASSIUM 100 MG/1
100 TABLET ORAL DAILY
Qty: 90 TABLET | Refills: 1 | Status: SHIPPED | OUTPATIENT
Start: 2018-02-05 | End: 2018-08-30 | Stop reason: SDUPTHER

## 2018-02-05 NOTE — PROGRESS NOTES
Subjective   Cassie Gomez is a 63 y.o. female.     History of Present Illness  Ms. Gomez presents today for routine f/u.  Diabetes Mellitus Type II, Follow-up: Patient here for follow-up of Type 2 diabetes mellitus. Denies new symptoms.    Known diabetic complications: none  Cardiovascular risk factors: diabetes mellitus, dyslipidemia, hypertension and sedentary lifestyle  Current diabetic medications include oral agent (monotherapy): metformin.   Eye exam current (within one year): yes  Weight trend: fluctuating a bit  Prior visit with dietician: yes -    Current diet: well balanced  Current exercise: walking  Current monitoring regimen: home blood tests - rarely  Home blood sugar records: unknown  Any episodes of hypoglycemia? no  Is She on ACE inhibitor or angiotensin II receptor blocker?   Yes losartan (Cozaar)      Hypertension: Patient here for follow-up of elevated blood pressure. She is not exercising and is fairly adherent to low salt diet.  Blood pressure not checked at home. Cardiac symptoms none. Patient denies chest pain, claudication, cough, dyspnea, exertional chest pressure/discomfort, irregular heart beat, lower extremity edema, palpitations and syncope.  Cardiovascular risk factors: as above. Use of agents associated with hypertension: thyroid hormones. History of target organ damage: none. Taking medication as rx'd. Notably pt does not recall being told she has HTN and was not aware of this dx. She has been taking her Losartan.      Hyperlipidemia: Patient presents with hyperlipidemia.  She was tested because of comorbidities.  She has not come for fasting labs, therefore HLP status unknown. She is not fasting today. Cardiac symptoms as above. No new focal neuro symptoms. There is a family history of hyperlipidemia. There is a family history of ischemic heart disease. Currently on statin and tolerating well.      Hypothyroidism: Patient presents for evaluation of thyroid function. Symptoms  "have previously consisted of fatigue, feeling cold and cold intolerance, anxiousness, change in skin,  nails, or hair, depression. Symptoms have present for several years. The symptoms were moderate.  The problem has been controlled. Previous thyroid studies include TSH, triiodothyronine free and free thyroxine. The hypothyroidism is acquired.Taking replacement as rx'd.      She is followed by Dr. Jean-Baptiste in neurology for \"mild cognitive impairment\". She is currently on nemantine and donepezil. She denies recent acute changes.     She has h/o vit def's. Taking replacement irregularly.    The following portions of the patient's history were reviewed and updated as appropriate: allergies, current medications, past family history, past medical history, past social history, past surgical history and problem list.    Review of Systems   Constitutional: Positive for fatigue. Negative for appetite change and fever.   HENT: Negative for congestion, mouth sores, nosebleeds, rhinorrhea, sore throat and trouble swallowing.    Eyes: Negative for visual disturbance.   Respiratory: Negative for cough, shortness of breath and wheezing.    Cardiovascular: Negative for chest pain, palpitations and leg swelling.   Gastrointestinal: Negative for abdominal pain, blood in stool, constipation, diarrhea, nausea and vomiting.   Endocrine: Negative for polydipsia and polyuria.   Genitourinary: Negative for dysuria, frequency, hematuria and urgency.   Musculoskeletal: Negative for arthralgias and myalgias.   Skin: Negative for rash and wound.   Neurological: Negative for dizziness, tremors, syncope, weakness and headaches.   Hematological: Negative for adenopathy. Does not bruise/bleed easily.   Psychiatric/Behavioral: Negative for confusion, dysphoric mood and sleep disturbance. The patient is not nervous/anxious.        Objective    Vitals:    02/05/18 0930   BP: 124/78   Pulse: 75   SpO2: 98%     Body mass index is 30.71 kg/(m^2).  Last 2 " weights    02/05/18  0930   Weight: 91.6 kg (202 lb)       Physical Exam   Constitutional: She is oriented to person, place, and time. She appears well-developed and well-nourished. She is cooperative. She does not appear ill. No distress.   overweight   HENT:   Head: Normocephalic and atraumatic.   Mouth/Throat: Oropharynx is clear and moist and mucous membranes are normal. Mucous membranes are not dry. No oral lesions.   Eyes: Conjunctivae and lids are normal.   Neck: Phonation normal. Neck supple. Normal carotid pulses present. Thyromegaly (mild on right) present.   Cardiovascular: Normal rate, regular rhythm, S1 normal, S2 normal and intact distal pulses.    Pulmonary/Chest: Effort normal and breath sounds normal.   Musculoskeletal: She exhibits no edema, tenderness or deformity.    Cassie had a diabetic foot exam performed today.   During the foot exam she had a monofilament test performed (normal).    Vascular Status -  Her exam exhibits no right foot edema. Her exam exhibits no left foot edema.   Skin Integrity  -  Her right foot skin is intact.  She has callous right foot.  She hasno right foot ulcer.    Cassie 's left foot skin is intact. She has callous left foot. She has no left foot ulcer..  Lymphadenopathy:     She has no cervical adenopathy.   Neurological: She is alert and oriented to person, place, and time. She has normal strength. She displays no tremor. Gait normal.   Skin: Skin is warm and dry. No ecchymosis and no rash noted. There is pallor.   Psychiatric: Her speech is normal. Thought content normal. Her affect is blunt.   Nursing note and vitals reviewed.    Assessment/Plan   Cassie was seen today for hyperlipidemia, diabetes and hypothyroidism.    Diagnoses and all orders for this visit:    Type 2 diabetes mellitus without complication, without long-term current use of insulin  -     Comprehensive Metabolic Panel  -     Hemoglobin A1c  -     Microalbumin / Creatinine Urine Ratio - Urine,  Clean Catch    Other hyperlipidemia  -     Comprehensive Metabolic Panel    Vitamin B 12 deficiency  -     Vitamin B12    Acquired hypothyroidism  -     TSH Rfx On Abnormal To Free T4    Vitamin D deficiency  -     Vitamin D 25 Hydroxy    Other orders  -     metFORMIN (GLUCOPHAGE) 500 MG tablet; Take 1 tablet by mouth Daily With Breakfast.  -     losartan (COZAAR) 100 MG tablet; Take 1 tablet by mouth Daily.    NIDDM historically controlled.   HLP of unknown status; currently on mevacor and tolerating well.  HTN controlled.   Appears euthyroid.  Assess status of vit/min deficiencies and replace as indicated.  Pt advised to eat a heart healthy diet and get regular aerobic exercise.  I will contact patient regarding test results and provide instructions regarding any necessary changes in plan of care.  Patient was encouraged to keep me informed of any acute changes, or any new concerning symptoms.  Routine f/u in 3-4 months fasting for FLP at that time.  FU sooner as needed/instructed.  Pt is aware of reasons to seek emergent care.  Patient voiced understanding of all instructions and denied further questions.

## 2018-02-06 LAB
25(OH)D3+25(OH)D2 SERPL-MCNC: 18.7 NG/ML
ALBUMIN SERPL-MCNC: 4.6 G/DL (ref 3.5–5)
ALBUMIN/CREAT UR: 3.7 MG/G CREAT (ref 0–30)
ALBUMIN/GLOB SERPL: 1.5 G/DL (ref 1–2)
ALP SERPL-CCNC: 108 U/L (ref 38–126)
ALT SERPL-CCNC: 22 U/L (ref 13–69)
AST SERPL-CCNC: 19 U/L (ref 15–46)
BILIRUB SERPL-MCNC: 0.5 MG/DL (ref 0.2–1.3)
BUN SERPL-MCNC: 10 MG/DL (ref 7–20)
BUN/CREAT SERPL: 12.5 (ref 7.1–23.5)
CALCIUM SERPL-MCNC: 10 MG/DL (ref 8.4–10.2)
CHLORIDE SERPL-SCNC: 100 MMOL/L (ref 98–107)
CO2 SERPL-SCNC: 32 MMOL/L (ref 26–30)
CREAT SERPL-MCNC: 0.8 MG/DL (ref 0.6–1.3)
CREAT UR-MCNC: 206 MG/DL
GFR SERPLBLD CREATININE-BSD FMLA CKD-EPI: 72 ML/MIN/1.73
GFR SERPLBLD CREATININE-BSD FMLA CKD-EPI: 88 ML/MIN/1.73
GLOBULIN SER CALC-MCNC: 3.1 GM/DL
GLUCOSE SERPL-MCNC: 192 MG/DL (ref 74–98)
HBA1C MFR BLD: 7.1 %
MICROALBUMIN UR-MCNC: 7.6 UG/ML
POTASSIUM SERPL-SCNC: 4.4 MMOL/L (ref 3.5–5.1)
PROT SERPL-MCNC: 7.7 G/DL (ref 6.3–8.2)
SODIUM SERPL-SCNC: 145 MMOL/L (ref 137–145)
TSH SERPL DL<=0.005 MIU/L-ACNC: 1.13 MIU/ML (ref 0.47–4.68)
VIT B12 SERPL-MCNC: >1000 PG/ML (ref 239–931)

## 2018-02-16 ENCOUNTER — OFFICE VISIT (OUTPATIENT)
Dept: NEUROLOGY | Facility: CLINIC | Age: 63
End: 2018-02-16

## 2018-02-16 VITALS — WEIGHT: 202 LBS | SYSTOLIC BLOOD PRESSURE: 118 MMHG | BODY MASS INDEX: 30.71 KG/M2 | DIASTOLIC BLOOD PRESSURE: 80 MMHG

## 2018-02-16 DIAGNOSIS — G31.84 MILD COGNITIVE IMPAIRMENT: Primary | ICD-10-CM

## 2018-02-16 PROCEDURE — 99213 OFFICE O/P EST LOW 20 MIN: CPT | Performed by: PSYCHIATRY & NEUROLOGY

## 2018-02-16 NOTE — PROGRESS NOTES
Subjective      CC memory loss    History of Present Illness   Cassie Gomez is a 63 y.o. female who returns to clinic today with a history of Mild Cognitive Impairment (MCI) . Her  has noted symptoms since approximately mid-2016 marked largely by forgetfulness. This has gradually worsened over time. Additional associated symptoms have included impairments in orientation and executive function. There are no other associated symptoms or modifying factors. Her family denies impairments in ADL's. She is currently residing at home with her .      Prior evaluation has included screening blood work  and an MRI of the brain which were unremarkable. She is currently taking donepezil 10 mg and memantine.    Since her last visit on 8/17/17, she feels essentially unchanged and her family agrees. She denies any new concerns.      The following portions of the patient's history were reviewed and updated as appropriate: allergies, current medications, past family history, past medical history, past social history, past surgical history and problem list.    Review of Systems   Constitutional: Negative.    Respiratory: Negative.    Cardiovascular: Negative.    Gastrointestinal: Negative.    Genitourinary: Negative.    Musculoskeletal: Negative.    Hematological: Negative.        Objective     /80  Wt 91.6 kg (202 lb)  BMI 30.71 kg/m2    General appearance today is normal.       Physical Exam   Constitutional: She is oriented to person, place, and time.   Neurological: She is oriented to person, place, and time. She has normal strength.   Psychiatric: Her speech is normal.        Neurologic Exam     Mental Status   Oriented to person, place, and time.   Registration: recalls 3 of 3 objects. Recall at 5 minutes: recalls 2 of 3 objects. Follows 3 step commands.   Attention: normal.   Speech: speech is normal   Level of consciousness: alert  Able to name object. Able to read. Able to repeat. Able to write.  Normal comprehension.     Cranial Nerves   Cranial nerves II through XII intact.     Motor Exam   Muscle bulk: normal  Overall muscle tone: normal    Strength   Strength 5/5 throughout.         Results  MMSE=25 (20 in 1/17)       Assessment/Plan   Cassie was seen today for memory loss.    Diagnoses and all orders for this visit:    Mild cognitive impairment        Discussion/Summary   Cassie Gomez returns to clinic today for evaluation of Mild Cognitive Impairment (MCI) . I again reviewed her current status and treatment options. After discussing potential treatment options, it was elected to continue on  donepezil and memantine unchanged. I recommended cognitive rehabilitation, which she will consider in the future. I also discussed potential clinical trials at some length. She will then follow up in 6 months, or sooner if needed.       I spent 15 minutes out of 20 minutes face to face with the patient and family and discussing diagnosis, current status, treatment options, management and clinical trials.    As part of this visit I obtained additional history from the family which is incorporated in the HPI.      Lew Jean-Baptiste MD

## 2018-02-26 RX ORDER — MEMANTINE HYDROCHLORIDE 10 MG/1
TABLET ORAL
Qty: 60 TABLET | Refills: 11 | Status: SHIPPED | OUTPATIENT
Start: 2018-02-26 | End: 2018-03-05 | Stop reason: SDUPTHER

## 2018-03-05 RX ORDER — MEMANTINE HYDROCHLORIDE 10 MG/1
10 TABLET ORAL 2 TIMES DAILY
Qty: 180 TABLET | Refills: 3 | Status: SHIPPED | OUTPATIENT
Start: 2018-03-05 | End: 2019-02-11 | Stop reason: SDUPTHER

## 2018-04-16 RX ORDER — LOVASTATIN 40 MG/1
40 TABLET ORAL NIGHTLY
Qty: 90 TABLET | Refills: 1 | Status: SHIPPED | OUTPATIENT
Start: 2018-04-16 | End: 2018-10-22 | Stop reason: SDUPTHER

## 2018-04-16 NOTE — TELEPHONE ENCOUNTER
Pt spouse called stating that pt is out of Lovastatin. Needs 90 day rx sent to [facility].  Call if any questions re: rx.

## 2018-05-24 ENCOUNTER — OFFICE VISIT (OUTPATIENT)
Dept: FAMILY MEDICINE CLINIC | Facility: CLINIC | Age: 63
End: 2018-05-24

## 2018-05-24 VITALS
SYSTOLIC BLOOD PRESSURE: 124 MMHG | HEART RATE: 72 BPM | BODY MASS INDEX: 31.67 KG/M2 | WEIGHT: 209 LBS | HEIGHT: 68 IN | OXYGEN SATURATION: 98 % | DIASTOLIC BLOOD PRESSURE: 82 MMHG

## 2018-05-24 DIAGNOSIS — E78.49 OTHER HYPERLIPIDEMIA: ICD-10-CM

## 2018-05-24 DIAGNOSIS — G31.84 MILD COGNITIVE IMPAIRMENT: ICD-10-CM

## 2018-05-24 DIAGNOSIS — E11.9 TYPE 2 DIABETES MELLITUS WITHOUT COMPLICATION, WITHOUT LONG-TERM CURRENT USE OF INSULIN (HCC): Primary | ICD-10-CM

## 2018-05-24 DIAGNOSIS — E03.9 ACQUIRED HYPOTHYROIDISM: ICD-10-CM

## 2018-05-24 DIAGNOSIS — I10 ESSENTIAL HYPERTENSION: ICD-10-CM

## 2018-05-24 DIAGNOSIS — F32.A DEPRESSIVE DISORDER: ICD-10-CM

## 2018-05-24 LAB — GLUCOSE BLDC GLUCOMTR-MCNC: 153 MG/DL (ref 70–130)

## 2018-05-24 PROCEDURE — 82962 GLUCOSE BLOOD TEST: CPT | Performed by: FAMILY MEDICINE

## 2018-05-24 PROCEDURE — 99214 OFFICE O/P EST MOD 30 MIN: CPT | Performed by: FAMILY MEDICINE

## 2018-05-24 NOTE — PROGRESS NOTES
Subjective   Cassie Gomez is a 63 y.o. female.     History of Present Illness  Ms. Gomez presents today for routine f/u.  Diabetes Mellitus Type II, Follow-up: Patient here for follow-up of Type 2 diabetes mellitus. Denies new symptoms.    Known diabetic complications: none  Cardiovascular risk factors: diabetes mellitus, dyslipidemia, hypertension and sedentary lifestyle  Current diabetic medications include oral agent (monotherapy): metformin.   Eye exam current (within one year): yes  Weight trend: fluctuating a bit  Prior visit with dietician: yes -    Current diet: well balanced  Current exercise: walking  Current monitoring regimen: home blood tests - rarely  Home blood sugar records: unknown  Any episodes of hypoglycemia? no  Is She on ACE inhibitor or angiotensin II receptor blocker?   Yes losartan (Cozaar)      Hypertension: Patient here for follow-up of elevated blood pressure. She is not exercising and is fairly adherent to low salt diet.  Blood pressure not checked at home. Cardiac symptoms none. Patient denies chest pain, claudication, cough, dyspnea, exertional chest pressure/discomfort, irregular heart beat, lower extremity edema, palpitations and syncope.  Cardiovascular risk factors: as above. Use of agents associated with hypertension: thyroid hormones. History of target organ damage: none. Taking medication as rx'd. Was encouraged to take ASA 81 mg daily but unsure if she is doing so..      Hyperlipidemia: Patient presents with hyperlipidemia.  She was tested because of comorbidities.  She has not come fasting for labs many times and it not fasting again today, therefore HLP status unknown. Cardiac symptoms as above. No new focal neuro symptoms. There is a family history of hyperlipidemia. There is a family history of ischemic heart disease. Currently on statin and tolerating well.      Hypothyroidism: Patient presents for evaluation of thyroid function. Symptoms have previously consisted of  "fatigue, feeling cold and cold intolerance, anxiousness, change in skin,  nails, or hair, depression. Symptoms have present for several years. The symptoms were moderate.  The problem has been controlled. Previous thyroid studies include TSH, triiodothyronine free and free thyroxine. The hypothyroidism is acquired.Taking replacement as rx'd.      She is followed by Dr. Jean-Baptiste in neurology for \"mild cognitive impairment\" which I suspect is early Alzheimer's. She is currently on nemantine and donepezil. She denies recent acute changes. Denies fall or injury. She has comorbid depression. Currently on celexa. Tolerating well.      She has vit def. Taking replacement irregularly.    The following portions of the patient's history were reviewed and updated as appropriate: allergies, current medications, past family history, past medical history, past social history, past surgical history and problem list.    Review of Systems   Constitutional: Positive for fatigue. Negative for appetite change and fever.   HENT: Negative for congestion, mouth sores, nosebleeds, rhinorrhea, sore throat and trouble swallowing.    Eyes: Negative for visual disturbance.   Respiratory: Negative for cough, shortness of breath and wheezing.    Cardiovascular: Negative for chest pain, palpitations and leg swelling.   Gastrointestinal: Negative for abdominal pain, blood in stool, constipation, diarrhea, nausea and vomiting.   Endocrine: Negative for polydipsia and polyuria.   Genitourinary: Negative for dysuria, frequency, hematuria and urgency.   Musculoskeletal: Negative for arthralgias and myalgias.   Skin: Negative for rash and wound.   Neurological: Negative for dizziness, tremors, syncope, weakness and headaches.   Hematological: Negative for adenopathy. Does not bruise/bleed easily.   Psychiatric/Behavioral: Positive for decreased concentration and dysphoric mood. Negative for confusion, sleep disturbance and suicidal ideas. The patient is " not nervous/anxious.         Decreased memory       Objective    Vitals:    05/24/18 0934   BP: 124/82   Pulse: 72   SpO2: 98%     Body mass index is 31.78 kg/m².  1    05/24/18 0934   Weight: 94.8 kg (209 lb)       Physical Exam   Constitutional: She is oriented to person, place, and time. She appears well-developed and well-nourished. She is cooperative. She does not appear ill. No distress.   obese   HENT:   Head: Normocephalic and atraumatic.   Mouth/Throat: Oropharynx is clear and moist and mucous membranes are normal. Mucous membranes are not dry. No oral lesions.   Eyes: Conjunctivae and lids are normal.   Neck: Phonation normal. Neck supple. Normal carotid pulses present. Thyromegaly (mild on right) present.   Cardiovascular: Normal rate, regular rhythm, S1 normal, S2 normal and intact distal pulses.  Exam reveals no gallop.    No murmur heard.  Pulmonary/Chest: Effort normal and breath sounds normal.   Musculoskeletal: She exhibits no edema, tenderness or deformity.    Cassie had a diabetic foot exam performed today.   During the foot exam she had a monofilament test performed (normal).  Vascular Status -  Her right foot exhibits no edema. Her left foot exhibits no edema.  Skin Integrity  -  Her right foot skin is intact. She has callous right foot.  She has no right foot ulcer.Her left foot skin is intact.She has callous left foot. She has no left foot ulcer..  Lymphadenopathy:     She has no cervical adenopathy.   Neurological: She is alert and oriented to person, place, and time. She has normal strength. She displays no tremor. Gait normal.   Skin: Skin is warm and dry. No ecchymosis and no rash noted. There is pallor.   Psychiatric: Her speech is normal. Thought content normal. Her affect is blunt. She exhibits abnormal remote memory.   Nursing note and vitals reviewed.      Assessment/Plan   Cassie was seen today for diabetes, hyperlipidemia and hypothyroidism.    Diagnoses and all orders for this  visit:    Type 2 diabetes mellitus without complication, without long-term current use of insulin  -     POC Glucose  -     Hemoglobin A1c  -     CBC (No Diff)  -     Comprehensive Metabolic Panel    Other hyperlipidemia  -     Lipid Panel  -     Comprehensive Metabolic Panel    Acquired hypothyroidism  -     TSH Rfx On Abnormal To Free T4    Essential hypertension  -     CBC (No Diff)  -     Comprehensive Metabolic Panel    Mild cognitive impairment    Depressive disorder    NIDDM  Previously near goal. Continue metformin. A1c as above. Adjust tx as indicated. She is encouraged to follow diabetic appropriate diet, exericise daily and have yearly eye exam.    HTN controlled. HLP with good tolerance of statin. Go ahead with FLP today as she has difficulty remembering to fast prior to visit.     Stable cognitive impairment/dementia. F/u with Dr. Jean-Baptiste as scheduled. Continue current meds.    Depression stable. Continue Celexa.    Appears euthyroid.    Routine f/u in 3-6 months, sooner as needed/instructed.  I will contact patient regarding test results and provide instructions regarding any necessary changes in plan of care.  Pt advised to eat a heart healthy diet and get regular aerobic exercise.  Patient was encouraged to keep me informed of any acute changes, lack of improvement, or any new concerning symptoms.  Pt is aware of reasons to seek emergent care.  Patient voiced understanding of all instructions and denied further questions.

## 2018-05-25 LAB
ALBUMIN SERPL-MCNC: 4.4 G/DL (ref 3.6–4.8)
ALBUMIN/GLOB SERPL: 1.6 {RATIO} (ref 1.2–2.2)
ALP SERPL-CCNC: 106 IU/L (ref 39–117)
ALT SERPL-CCNC: 8 IU/L (ref 0–32)
AST SERPL-CCNC: 14 IU/L (ref 0–40)
BILIRUB SERPL-MCNC: 0.3 MG/DL (ref 0–1.2)
BUN SERPL-MCNC: 10 MG/DL (ref 8–27)
BUN/CREAT SERPL: 11 (ref 12–28)
CALCIUM SERPL-MCNC: 9.9 MG/DL (ref 8.7–10.3)
CHLORIDE SERPL-SCNC: 100 MMOL/L (ref 96–106)
CHOLEST SERPL-MCNC: 144 MG/DL (ref 100–199)
CO2 SERPL-SCNC: 28 MMOL/L (ref 18–29)
CREAT SERPL-MCNC: 0.91 MG/DL (ref 0.57–1)
ERYTHROCYTE [DISTWIDTH] IN BLOOD BY AUTOMATED COUNT: 15.1 % (ref 12.3–15.4)
GFR SERPLBLD CREATININE-BSD FMLA CKD-EPI: 67 ML/MIN/1.73
GFR SERPLBLD CREATININE-BSD FMLA CKD-EPI: 77 ML/MIN/1.73
GLOBULIN SER CALC-MCNC: 2.8 G/DL (ref 1.5–4.5)
GLUCOSE SERPL-MCNC: 127 MG/DL (ref 65–99)
HBA1C MFR BLD: 7.2 % (ref 4.8–5.6)
HCT VFR BLD AUTO: 41.1 % (ref 34–46.6)
HDLC SERPL-MCNC: 64 MG/DL
HGB BLD-MCNC: 13.2 G/DL (ref 11.1–15.9)
LDLC SERPL CALC-MCNC: 59 MG/DL (ref 0–99)
MCH RBC QN AUTO: 27.4 PG (ref 26.6–33)
MCHC RBC AUTO-ENTMCNC: 32.1 G/DL (ref 31.5–35.7)
MCV RBC AUTO: 85 FL (ref 79–97)
PLATELET # BLD AUTO: 334 X10E3/UL (ref 150–379)
POTASSIUM SERPL-SCNC: 4.7 MMOL/L (ref 3.5–5.2)
PROT SERPL-MCNC: 7.2 G/DL (ref 6–8.5)
RBC # BLD AUTO: 4.81 X10E6/UL (ref 3.77–5.28)
SODIUM SERPL-SCNC: 144 MMOL/L (ref 134–144)
TRIGL SERPL-MCNC: 104 MG/DL (ref 0–149)
TSH SERPL DL<=0.005 MIU/L-ACNC: 1.29 UIU/ML (ref 0.45–4.5)
VLDLC SERPL CALC-MCNC: 21 MG/DL (ref 5–40)
WBC # BLD AUTO: 9 X10E3/UL (ref 3.4–10.8)

## 2018-05-26 PROBLEM — F32.A DEPRESSIVE DISORDER: Status: ACTIVE | Noted: 2018-05-26

## 2018-06-19 RX ORDER — LEVOTHYROXINE SODIUM 0.05 MG/1
50 TABLET ORAL DAILY
Qty: 30 TABLET | Refills: 5 | Status: SHIPPED | OUTPATIENT
Start: 2018-06-19 | End: 2018-07-23 | Stop reason: SDUPTHER

## 2018-07-23 RX ORDER — LEVOTHYROXINE SODIUM 0.05 MG/1
50 TABLET ORAL DAILY
Qty: 90 TABLET | Refills: 0 | Status: SHIPPED | OUTPATIENT
Start: 2018-07-23 | End: 2018-10-22 | Stop reason: SDUPTHER

## 2018-08-24 ENCOUNTER — OFFICE VISIT (OUTPATIENT)
Dept: NEUROLOGY | Facility: CLINIC | Age: 63
End: 2018-08-24

## 2018-08-24 VITALS
SYSTOLIC BLOOD PRESSURE: 122 MMHG | BODY MASS INDEX: 31.67 KG/M2 | HEIGHT: 68 IN | WEIGHT: 209 LBS | DIASTOLIC BLOOD PRESSURE: 80 MMHG

## 2018-08-24 DIAGNOSIS — G31.84 MILD COGNITIVE IMPAIRMENT: Primary | ICD-10-CM

## 2018-08-24 PROCEDURE — 99214 OFFICE O/P EST MOD 30 MIN: CPT | Performed by: PHYSICIAN ASSISTANT

## 2018-08-24 NOTE — PROGRESS NOTES
"Subjective     Chief Complaint: memory loss      History of Present Illness   Cassie Gomez is a 63 y.o. female who returns to clinic today with a history of Mild Cognitive Impairment (MCI) . Her  has noted symptoms since approximately mid-2016 marked largely by forgetfulness. This has gradually worsened over time. Additional associated symptoms have included impairments in orientation and executive function. There are no other associated symptoms or modifying factors. Her family denies impairments in ADL's. She is currently residing at home with her .      Prior evaluation has included screening blood work  and an MRI of the brain which were unremarkable. She is currently taking donepezil 10 mg and memantine.    Today: Since her last visit in 2/18, she feels essentially unchanged and her family agrees.       I have reviewed and confirmed the past family, social and medical history as accurate on 8/24/18.     Review of Systems   Constitutional: Negative.    HENT: Negative.    Eyes: Negative.    Respiratory: Negative.    Cardiovascular: Negative.    Gastrointestinal: Negative.    Endocrine: Negative.    Genitourinary: Negative.    Musculoskeletal: Negative.    Skin: Negative.    Allergic/Immunologic: Negative.    Neurological:        Memory loss     Hematological: Negative.    Psychiatric/Behavioral: Negative.        Objective     /80   Ht 172.7 cm (68\")   Wt 94.8 kg (209 lb)   BMI 31.78 kg/m²     General appearance today is normal.     Physical Exam   Neurological: She has normal strength. She has a normal Finger-Nose-Finger Test.   Psychiatric: Her speech is normal.        Neurologic Exam     Mental Status   Oriented to person.   Oriented to place.   Disoriented to year, month, date and season. Oriented to day.   Registration: recalls 3 of 3 objects. Recall at 5 minutes: recalls 3 of 3 objects. Follows 3 step commands.   Attention: 4/5 sequencing    Speech: speech is normal   Level of " consciousness: alert  Able to name object. Able to read. Able to repeat. Able to write. Normal comprehension.     Cranial Nerves   Cranial nerves II through XII intact.     Motor Exam   Muscle bulk: normal  Overall muscle tone: normal    Strength   Strength 5/5 throughout.     Sensory Exam   Light touch normal.     Gait, Coordination, and Reflexes     Coordination   Finger to nose coordination: normal    Tremor   Resting tremor: absent        Results  MMSE=24 (25 in 2/18)       Assessment/Plan   Cassie was seen today for memory loss.    Diagnoses and all orders for this visit:    Mild cognitive impairment          Discussion/Summary   Cassie Gomez returns to clinic today for evaluation of Mild Cognitive Impairment (MCI) . I again reviewed her current status and treatment options. After discussing potential treatment options, it was elected to continue on  donepezil and memantine unchanged. I have also made a referral to SLP for cognitive rehabilitation at [facility] as she lives in [place]. She will then follow up in 6 months , or sooner if needed.   I spent 25 minutes minutes face to face with the patient and family with 20 minutes spent on discussing diagnosis, prognosis, evaluation, current status, treatment options and management as discussed above.       As part of this visit I obtained additional history from the family which is incorporated in the HPI.      Lavinia Randhawa PA-C

## 2018-08-30 ENCOUNTER — TELEPHONE (OUTPATIENT)
Dept: FAMILY MEDICINE CLINIC | Facility: CLINIC | Age: 63
End: 2018-08-30

## 2018-08-30 RX ORDER — LOSARTAN POTASSIUM 100 MG/1
100 TABLET ORAL DAILY
Qty: 90 TABLET | Refills: 1 | Status: SHIPPED | OUTPATIENT
Start: 2018-08-30 | End: 2018-11-20 | Stop reason: SDUPTHER

## 2018-10-22 ENCOUNTER — TELEPHONE (OUTPATIENT)
Dept: FAMILY MEDICINE CLINIC | Facility: CLINIC | Age: 63
End: 2018-10-22

## 2018-10-22 RX ORDER — CITALOPRAM 10 MG/1
TABLET ORAL
Qty: 90 TABLET | Refills: 2 | Status: SHIPPED | OUTPATIENT
Start: 2018-10-22 | End: 2019-01-28 | Stop reason: SDUPTHER

## 2018-10-22 RX ORDER — LEVOTHYROXINE SODIUM 0.05 MG/1
50 TABLET ORAL DAILY
Qty: 90 TABLET | Refills: 0 | Status: SHIPPED | OUTPATIENT
Start: 2018-10-22 | End: 2019-01-28 | Stop reason: SDUPTHER

## 2018-10-22 RX ORDER — LOVASTATIN 40 MG/1
40 TABLET ORAL NIGHTLY
Qty: 90 TABLET | Refills: 1 | Status: SHIPPED | OUTPATIENT
Start: 2018-10-22 | End: 2019-01-28 | Stop reason: SDUPTHER

## 2018-10-22 NOTE — TELEPHONE ENCOUNTER
Pt spouse called req refills on her citalopram, levothyoxine, lovastatin and metformin rx's.     [facility]

## 2018-11-20 ENCOUNTER — OFFICE VISIT (OUTPATIENT)
Dept: FAMILY MEDICINE CLINIC | Facility: CLINIC | Age: 63
End: 2018-11-20

## 2018-11-20 VITALS
BODY MASS INDEX: 33.34 KG/M2 | DIASTOLIC BLOOD PRESSURE: 84 MMHG | OXYGEN SATURATION: 98 % | HEIGHT: 68 IN | SYSTOLIC BLOOD PRESSURE: 120 MMHG | HEART RATE: 76 BPM | WEIGHT: 220 LBS

## 2018-11-20 DIAGNOSIS — E11.9 TYPE 2 DIABETES MELLITUS WITHOUT COMPLICATION, WITHOUT LONG-TERM CURRENT USE OF INSULIN (HCC): ICD-10-CM

## 2018-11-20 DIAGNOSIS — E78.49 OTHER HYPERLIPIDEMIA: ICD-10-CM

## 2018-11-20 DIAGNOSIS — F32.A DEPRESSIVE DISORDER: ICD-10-CM

## 2018-11-20 DIAGNOSIS — Z12.31 ENCOUNTER FOR SCREENING MAMMOGRAM FOR BREAST CANCER: ICD-10-CM

## 2018-11-20 DIAGNOSIS — E03.9 ACQUIRED HYPOTHYROIDISM: ICD-10-CM

## 2018-11-20 DIAGNOSIS — E53.8 VITAMIN B 12 DEFICIENCY: ICD-10-CM

## 2018-11-20 DIAGNOSIS — G31.84 MILD COGNITIVE IMPAIRMENT: ICD-10-CM

## 2018-11-20 DIAGNOSIS — I10 ESSENTIAL HYPERTENSION: Primary | ICD-10-CM

## 2018-11-20 LAB
EXPIRATION DATE: ABNORMAL
GLUCOSE BLDC GLUCOMTR-MCNC: 127 MG/DL (ref 70–130)
HBA1C MFR BLD: 7.3 %
Lab: ABNORMAL

## 2018-11-20 PROCEDURE — 83036 HEMOGLOBIN GLYCOSYLATED A1C: CPT | Performed by: FAMILY MEDICINE

## 2018-11-20 PROCEDURE — 99214 OFFICE O/P EST MOD 30 MIN: CPT | Performed by: FAMILY MEDICINE

## 2018-11-20 PROCEDURE — 82962 GLUCOSE BLOOD TEST: CPT | Performed by: FAMILY MEDICINE

## 2018-11-20 RX ORDER — LOSARTAN POTASSIUM 100 MG/1
100 TABLET ORAL DAILY
Qty: 90 TABLET | Refills: 1 | Status: SHIPPED | OUTPATIENT
Start: 2018-11-20 | End: 2019-07-30 | Stop reason: SDUPTHER

## 2018-11-20 RX ORDER — DONEPEZIL HYDROCHLORIDE 10 MG/1
10 TABLET, FILM COATED ORAL DAILY
Qty: 90 TABLET | Refills: 3 | Status: SHIPPED | OUTPATIENT
Start: 2018-11-20 | End: 2019-04-28 | Stop reason: SDUPTHER

## 2018-11-20 NOTE — PROGRESS NOTES
Subjective   Cassie Gomez is a 63 y.o. female.     History of Present Illness  Ms. Gomez presents today with her  for routine f/u.  Diabetes Mellitus Type II, Follow-up: Patient here for follow-up of Type 2 diabetes mellitus. Denies new symptoms.    Known diabetic complications: none  Cardiovascular risk factors: diabetes mellitus, dyslipidemia, hypertension and sedentary lifestyle  Current diabetic medications include oral agent (monotherapy): metformin.   Eye exam current (within one year): yes  Weight trend: fluctuating a bit  Prior visit with dietician: yes -    Current diet: well balanced  Current exercise: walking  Current monitoring regimen: home blood tests - rarely  Home blood sugar records: unknown  Any episodes of hypoglycemia? no  Is She on ACE inhibitor or angiotensin II receptor blocker?   Yes losartan (Cozaar)      Hypertension: Patient here for follow-up of elevated blood pressure. She is not exercising and is fairly adherent to low salt diet.  Blood pressure not checked at home. Cardiac symptoms none. Patient denies chest pain, claudication, cough, dyspnea, exertional chest pressure/discomfort, irregular heart beat, lower extremity edema, palpitations and syncope.  Cardiovascular risk factors: as above. Use of agents associated with hypertension: thyroid hormones. History of target organ damage: none. Taking medication as rx'd.      Hyperlipidemia: Patient presents with hyperlipidemia.  She was tested because of comorbidities.  She has not come fasting for labs many times and it not fasting again today, therefore HLP status unknown. Cardiac symptoms as above. No new focal neuro symptoms. There is a family history of hyperlipidemia. There is a family history of ischemic heart disease. Currently on statin and tolerating well.      Hypothyroidism: Patient presents for evaluation of thyroid function. Symptoms have previously consisted of fatigue, feeling cold and cold intolerance,  "anxiousness, change in skin,  nails, or hair, depression. Symptoms have present for several years. The symptoms were moderate.  The problem has been controlled. Previous thyroid studies include TSH, triiodothyronine free and free thyroxine. The hypothyroidism is acquired.Taking replacement as rx'd.      She is followed by Dr. Jean-Baptiste in neurology for \"mild cognitive impairment\" which is suspected to be early Alzheimer's. She is currently on nemantine and donepezil. She denies recent acute changes. She has comorbid depression. Currently on celexa. Tolerating well. she has had 1 fall since last visit. She was visiting a nursing home with her , tripped/fell while walking down wong. Unsure is floor was slick. No head injury, no LOC, no other symptoms at time of fall.  notices that she is having increased trouble with decision making.     She has had low vit D and vit 12 in past. Taking replacement irregularly.    The following portions of the patient's history were reviewed and updated as appropriate: allergies, current medications, past family history, past medical history, past social history, past surgical history and problem list.    Review of Systems   Constitutional: Positive for fatigue. Negative for appetite change and fever.   HENT: Negative for congestion, mouth sores, nosebleeds, rhinorrhea, sore throat and trouble swallowing.    Eyes: Negative for visual disturbance.   Respiratory: Negative for cough, shortness of breath and wheezing.    Cardiovascular: Negative for chest pain, palpitations and leg swelling.   Gastrointestinal: Negative for abdominal pain, blood in stool, constipation, diarrhea, nausea and vomiting.   Endocrine: Negative for polydipsia and polyuria.   Genitourinary: Negative for dysuria, frequency, hematuria and urgency.   Musculoskeletal: Negative for arthralgias and myalgias.   Skin: Negative for rash and wound.   Neurological: Negative for dizziness, tremors, syncope, " weakness and headaches.   Hematological: Negative for adenopathy. Does not bruise/bleed easily.   Psychiatric/Behavioral: Positive for confusion, decreased concentration and dysphoric mood. Negative for sleep disturbance and suicidal ideas. The patient is not nervous/anxious.         Decreased memory       Objective    Vitals:    11/20/18 1141   BP: 120/84   Pulse: 76   SpO2: 98%     Body mass index is 33.45 kg/m².      11/20/18  1141   Weight: 99.8 kg (220 lb)       Physical Exam   Constitutional: She is oriented to person, place, and time. She appears well-developed and well-nourished. She is cooperative. She does not appear ill. No distress.   obese   HENT:   Head: Normocephalic and atraumatic.   Mouth/Throat: Oropharynx is clear and moist and mucous membranes are normal. Mucous membranes are not dry. No oral lesions.   Eyes: Conjunctivae and lids are normal. Right eye exhibits no nystagmus. Left eye exhibits no nystagmus.   Neck: Phonation normal. Neck supple. Normal carotid pulses present. Thyromegaly (mild on right) present.   Cardiovascular: Normal rate, regular rhythm, S1 normal, S2 normal and intact distal pulses. Exam reveals no gallop.   No murmur heard.  Pulmonary/Chest: Effort normal and breath sounds normal.   Musculoskeletal: She exhibits no edema, tenderness or deformity.     Vascular Status -  Her right foot exhibits no edema. Her left foot exhibits no edema.  Lymphadenopathy:     She has no cervical adenopathy.   Neurological: She is alert and oriented to person, place, and time. She has normal strength. She displays no tremor. She displays a negative Romberg sign. Coordination and gait normal.   Skin: Skin is warm and dry. No ecchymosis and no rash noted. There is pallor.   Psychiatric: Her speech is normal. Thought content normal. Her affect is blunt. She exhibits abnormal remote memory.   Good eye contact. Answers questions appropriately. Good personal hygiene and grooming.   Nursing note and  vitals reviewed.    Lab Results   Component Value Date    HGBA1C 7.3 11/20/2018     Lab Results   Component Value Date    WBC 10.73 07/06/2017    HGB 13.2 05/24/2018    HCT 41.1 05/24/2018    MCV 85 05/24/2018     05/24/2018     Lab Results   Component Value Date    BUN 10 05/24/2018    CREATININE 0.91 05/24/2018    EGFRIFNONA 67 05/24/2018    EGFRIFAFRI 77 05/24/2018    BCR 11 (L) 05/24/2018    K 4.7 05/24/2018    CO2 28 05/24/2018    CALCIUM 9.9 05/24/2018    PROTENTOTREF 7.2 05/24/2018    ALBUMIN 4.4 05/24/2018    LABIL2 1.6 05/24/2018    AST 14 05/24/2018    ALT 8 05/24/2018     Lab Results   Component Value Date    TSH 1.290 05/24/2018     Lab Results   Component Value Date    CHLPL 144 05/24/2018    TRIG 104 05/24/2018    HDL 64 05/24/2018    LDL 59 05/24/2018       Assessment/Plan   Cassie was seen today for diabetes, hyperlipidemia, hypertension and depression.    Diagnoses and all orders for this visit:    Essential hypertension    Other hyperlipidemia    Vitamin B 12 deficiency    Acquired hypothyroidism    Type 2 diabetes mellitus without complication, without long-term current use of insulin (CMS/[facility])  -     POC Glycated Hemoglobin, Total  -     POC Glucose    Mild cognitive impairment    Depressive disorder    Encounter for screening mammogram for breast cancer  -     Mammo Screening Digital Tomosynthesis Bilateral With CAD; Future    Other orders  -     donepezil (ARICEPT) 10 MG tablet; Take 1 tablet by mouth Daily.  -     losartan (COZAAR) 100 MG tablet; Take 1 tablet by mouth Daily.       NIDDM with A1c near goal. Continue metformin. She is encouraged to follow diabetic appropriate diet, exericise daily and have yearly eye exam.     HTN controlled. HLP with good tolerance of statin.      Cognitive impairment/dementia. F/u with Dr. Jean-Baptiste as scheduled. Continue current meds. Fall precautions reviewed.     Depression stable. Continue Celexa.     Appears euthyroid.    She is overdue for cervical  cancer screening. She wishes to delay this until she has her welcome to medicare visit after 1st of year.     Routine f/u in 3-6 months, sooner as needed/instructed.  Pt advised to eat a heart healthy diet and get regular aerobic exercise.  Patient was encouraged to keep me informed of any acute changes, for any new concerning symptoms.  Pt is aware of reasons to seek emergent care.  Patient voiced understanding of all instructions and denied further questions.

## 2019-01-10 ENCOUNTER — HOSPITAL ENCOUNTER (OUTPATIENT)
Dept: MAMMOGRAPHY | Facility: HOSPITAL | Age: 64
Discharge: HOME | End: 2019-01-10
Admitting: FAMILY MEDICINE

## 2019-01-10 DIAGNOSIS — Z12.31 ENCOUNTER FOR SCREENING MAMMOGRAM FOR BREAST CANCER: ICD-10-CM

## 2019-01-10 PROCEDURE — 77063 BREAST TOMOSYNTHESIS BI: CPT

## 2019-01-10 PROCEDURE — 77067 SCR MAMMO BI INCL CAD: CPT

## 2019-01-28 RX ORDER — CITALOPRAM 10 MG/1
TABLET ORAL
Qty: 90 TABLET | Refills: 2 | Status: SHIPPED | OUTPATIENT
Start: 2019-01-28 | End: 2019-03-13

## 2019-01-28 RX ORDER — LEVOTHYROXINE SODIUM 0.05 MG/1
50 TABLET ORAL DAILY
Qty: 90 TABLET | Refills: 0 | Status: SHIPPED | OUTPATIENT
Start: 2019-01-28 | End: 2020-01-13 | Stop reason: SDUPTHER

## 2019-01-28 RX ORDER — LOVASTATIN 40 MG/1
40 TABLET ORAL NIGHTLY
Qty: 90 TABLET | Refills: 1 | Status: SHIPPED | OUTPATIENT
Start: 2019-01-28 | End: 2019-04-28 | Stop reason: SDUPTHER

## 2019-02-11 RX ORDER — MEMANTINE HYDROCHLORIDE 10 MG/1
10 TABLET ORAL 2 TIMES DAILY
Qty: 180 TABLET | Refills: 3 | Status: SHIPPED | OUTPATIENT
Start: 2019-02-11 | End: 2019-06-02 | Stop reason: SDUPTHER

## 2019-03-13 ENCOUNTER — OFFICE VISIT (OUTPATIENT)
Dept: NEUROLOGY | Facility: CLINIC | Age: 64
End: 2019-03-13

## 2019-03-13 VITALS
HEART RATE: 74 BPM | WEIGHT: 220 LBS | BODY MASS INDEX: 33.34 KG/M2 | DIASTOLIC BLOOD PRESSURE: 80 MMHG | HEIGHT: 68 IN | SYSTOLIC BLOOD PRESSURE: 120 MMHG

## 2019-03-13 DIAGNOSIS — G30.1 LATE ONSET ALZHEIMER'S DISEASE WITHOUT BEHAVIORAL DISTURBANCE (HCC): Primary | ICD-10-CM

## 2019-03-13 DIAGNOSIS — F02.80 LATE ONSET ALZHEIMER'S DISEASE WITHOUT BEHAVIORAL DISTURBANCE (HCC): Primary | ICD-10-CM

## 2019-03-13 PROCEDURE — 99214 OFFICE O/P EST MOD 30 MIN: CPT | Performed by: PHYSICIAN ASSISTANT

## 2019-03-13 RX ORDER — SERTRALINE HYDROCHLORIDE 25 MG/1
25 TABLET, FILM COATED ORAL DAILY
Qty: 30 TABLET | Refills: 11 | Status: SHIPPED | OUTPATIENT
Start: 2019-03-13 | End: 2019-04-01 | Stop reason: SDUPTHER

## 2019-03-13 NOTE — PROGRESS NOTES
"Subjective     Chief Complaint: memory loss      History of Present Illness   Cassie Gomez is a 64 y.o. female who returns to clinic today with a history of Mild Cognitive Impairment (MCI) . Her  has noted symptoms since approximately mid-2016 marked largely by forgetfulness. This has gradually worsened over time. Additional associated symptoms have included impairments in orientation and executive function. There are no other associated symptoms or modifying factors. Her family notes impairments in ADL's. She is currently residing at home with her .      Prior evaluation has included screening blood work  and an MRI of the brain which were unremarkable. She is currently taking donepezil 10 mg and memantine.     Today: Since her last visit in 8/18, she feels essentially unchanged. Her family has noted a continued cognitive decline as well as increased depression. She often states that she has no self worth or purpose.       I have reviewed and confirmed the past family, social and medical history as accurate on 3/13/19.     Review of Systems   Constitutional: Negative.    HENT: Negative.    Eyes: Negative.    Respiratory: Negative.    Cardiovascular: Negative.    Gastrointestinal: Negative.    Endocrine: Negative.    Genitourinary: Negative.    Musculoskeletal: Negative.    Skin: Negative.    Allergic/Immunologic: Negative.    Neurological:        Memory loss     Hematological: Negative.    Psychiatric/Behavioral: Positive for dysphoric mood.       Objective     /80   Pulse 74   Ht 172.7 cm (68\")   Wt 99.8 kg (220 lb)   BMI 33.45 kg/m²     General appearance today is normal.     Physical Exam   Neurological: She has normal strength. She has a normal Finger-Nose-Finger Test.   Psychiatric: Her speech is normal.        Neurologic Exam     Mental Status   Oriented to person.   Oriented to place.   Oriented to time. Disoriented to date.   Registration: recalls 3 of 3 objects. Recall at 5 " minutes: recalls 1 of 3 objects. Follows 3 step commands.   Attention: 2/5 sequencing.   Speech: speech is normal   Level of consciousness: alert  Able to name object. Able to read. Able to repeat. Able to write. Normal comprehension.     Cranial Nerves   Cranial nerves II through XII intact.     Motor Exam   Muscle bulk: normal  Overall muscle tone: normal    Strength   Strength 5/5 throughout.     Sensory Exam   Light touch normal.     Gait, Coordination, and Reflexes     Coordination   Finger to nose coordination: normal    Tremor   Resting tremor: absent        Results  MMSE=21 (24 in 8/18)       Assessment/Plan   Cassie was seen today for memory loss.    Diagnoses and all orders for this visit:    Late onset Alzheimer's disease without behavioral disturbance    Other orders  -     sertraline (ZOLOFT) 25 MG tablet; Take 1 tablet by mouth Daily.          Discussion/Summary   Cassie Gomez returns to clinic today with a history and examination most consistent with Alzheimer's Disease . I again reviewed her current status and treatment options. After discussing potential treatment options, it was elected to add Zoloft for her depression and continue on donepezil and memantine unchanged. I have also asked Rosy Hernandez,  to contact the patient's family to discuss potential resources and support options. She will then follow up in 2-3 months, or sooner if needed.   I spent 25 minutes face to face with the patient and family with 20 minutes spent on discussing diagnosis, evaluation, current status, treatment options and management as discussed above.       As part of this visit I obtained additional history from the family which is incorporated in the HPI.      Lavinia Randhawa PA-C

## 2019-03-21 ENCOUNTER — TELEPHONE (OUTPATIENT)
Dept: NEUROLOGY | Facility: CLINIC | Age: 64
End: 2019-03-21

## 2019-03-21 NOTE — TELEPHONE ENCOUNTER
----- Message from Lavinia Randhawa PA-C sent at 3/13/2019 10:54 AM EDT -----  Would you care to contact Ms. Gomez's  when you get a chance? She needs 24/7 supervision, and I think he needs increased support. They are in [place], but I feel that she would benefit from some type of active adult day center. She often states that she feels worthless. Thanks

## 2019-04-01 RX ORDER — SERTRALINE HYDROCHLORIDE 25 MG/1
25 TABLET, FILM COATED ORAL DAILY
Qty: 30 TABLET | Refills: 11 | Status: SHIPPED | OUTPATIENT
Start: 2019-04-01 | End: 2019-05-04 | Stop reason: SDUPTHER

## 2019-04-29 RX ORDER — LOVASTATIN 40 MG/1
40 TABLET ORAL NIGHTLY
Qty: 90 TABLET | Refills: 1 | Status: SHIPPED | OUTPATIENT
Start: 2019-04-29 | End: 2019-07-30 | Stop reason: SDUPTHER

## 2019-04-29 RX ORDER — DONEPEZIL HYDROCHLORIDE 10 MG/1
10 TABLET, FILM COATED ORAL DAILY
Qty: 90 TABLET | Refills: 3 | Status: SHIPPED | OUTPATIENT
Start: 2019-04-29 | End: 2019-05-29 | Stop reason: SDUPTHER

## 2019-05-06 RX ORDER — SERTRALINE HYDROCHLORIDE 25 MG/1
25 TABLET, FILM COATED ORAL DAILY
Qty: 30 TABLET | Refills: 11 | Status: SHIPPED | OUTPATIENT
Start: 2019-05-06 | End: 2019-06-02 | Stop reason: SDUPTHER

## 2019-05-21 ENCOUNTER — OFFICE VISIT (OUTPATIENT)
Dept: FAMILY MEDICINE CLINIC | Facility: CLINIC | Age: 64
End: 2019-05-21

## 2019-05-21 VITALS
OXYGEN SATURATION: 98 % | WEIGHT: 233 LBS | DIASTOLIC BLOOD PRESSURE: 74 MMHG | HEART RATE: 67 BPM | HEIGHT: 68 IN | SYSTOLIC BLOOD PRESSURE: 128 MMHG | BODY MASS INDEX: 35.31 KG/M2

## 2019-05-21 DIAGNOSIS — I10 ESSENTIAL HYPERTENSION: ICD-10-CM

## 2019-05-21 DIAGNOSIS — K58.2 IRRITABLE BOWEL SYNDROME WITH BOTH CONSTIPATION AND DIARRHEA: ICD-10-CM

## 2019-05-21 DIAGNOSIS — E03.9 ACQUIRED HYPOTHYROIDISM: ICD-10-CM

## 2019-05-21 DIAGNOSIS — G30.1 LATE ONSET ALZHEIMER'S DISEASE WITHOUT BEHAVIORAL DISTURBANCE (HCC): ICD-10-CM

## 2019-05-21 DIAGNOSIS — Z00.00 WELCOME TO MEDICARE PREVENTIVE VISIT: Primary | ICD-10-CM

## 2019-05-21 DIAGNOSIS — F32.A DEPRESSIVE DISORDER: ICD-10-CM

## 2019-05-21 DIAGNOSIS — E11.9 TYPE 2 DIABETES MELLITUS WITHOUT COMPLICATION, WITHOUT LONG-TERM CURRENT USE OF INSULIN (HCC): ICD-10-CM

## 2019-05-21 DIAGNOSIS — Z23 NEED FOR PNEUMOCOCCAL VACCINATION: ICD-10-CM

## 2019-05-21 DIAGNOSIS — E78.2 MIXED HYPERLIPIDEMIA: ICD-10-CM

## 2019-05-21 DIAGNOSIS — Z13.6 ENCOUNTER FOR SCREENING FOR CARDIOVASCULAR DISORDERS: ICD-10-CM

## 2019-05-21 DIAGNOSIS — F02.80 LATE ONSET ALZHEIMER'S DISEASE WITHOUT BEHAVIORAL DISTURBANCE (HCC): ICD-10-CM

## 2019-05-21 LAB
ALUMINUM/CREAT UR: <30
EXPIRATION DATE: ABNORMAL
HBA1C MFR BLD: 8.1 %
Lab: ABNORMAL
POC CREATININE URINE: 100
POC MICROALBUMIN URINE: 30

## 2019-05-21 PROCEDURE — 90471 IMMUNIZATION ADMIN: CPT | Performed by: FAMILY MEDICINE

## 2019-05-21 PROCEDURE — 82044 UR ALBUMIN SEMIQUANTITATIVE: CPT | Performed by: FAMILY MEDICINE

## 2019-05-21 PROCEDURE — G0402 INITIAL PREVENTIVE EXAM: HCPCS | Performed by: FAMILY MEDICINE

## 2019-05-21 PROCEDURE — 90670 PCV13 VACCINE IM: CPT | Performed by: FAMILY MEDICINE

## 2019-05-21 PROCEDURE — 83036 HEMOGLOBIN GLYCOSYLATED A1C: CPT | Performed by: FAMILY MEDICINE

## 2019-05-21 PROCEDURE — G0405 EKG INTERPRET & REPORT PREVE: HCPCS | Performed by: FAMILY MEDICINE

## 2019-05-21 RX ORDER — CITALOPRAM 10 MG/1
TABLET ORAL
COMMUNITY
Start: 2019-04-29 | End: 2019-05-21

## 2019-05-21 NOTE — PATIENT INSTRUCTIONS
RETURN FASTING FOR LABS    YOUR A1C TODAY WAS 8.1. GOAL A1C IS 7 SO YOUR DIABETES  IS NOT WELL CONTROLLED. I RECOMMEND YOU INCREASE YOUR METFORMIN TO 2 TABLETS TWICE DAILY WITH MEALS. LET ME KNOW IF  YOU HAVE ANY PROBLEMS WITH THAT DOSE.    NEUROLOGY OFFICE PRESCRIBED ZOLOFT (SERTRALINE). IF YOU ARE TAKING THIS MEDICATION YOU SHOULD DISCONTINUE CELEXA (CITALOPRAM) IF YOU ARE STILL TAKING IT.    Preventive Care 65 Years and Older, Female  Preventive care refers to lifestyle choices and visits with your health care provider that can promote health and wellness.  What does preventive care include?  · A yearly physical exam. This is also called an annual well check.  · Dental exams once or twice a year.  · Routine eye exams. Ask your health care provider how often you should have your eyes checked.  · Personal lifestyle choices, including:  ? Daily care of your teeth and gums.  ? Regular physical activity.  ? Eating a healthy diet.  ? Avoiding tobacco and drug use.  ? Limiting alcohol use.  ? Practicing safe sex.  ? Taking low-dose aspirin every day.  ? Taking vitamin and mineral supplements as recommended by your health care provider.  What happens during an annual well check?  The services and screenings done by your health care provider during your annual well check will depend on your age, overall health, lifestyle risk factors, and family history of disease.  Counseling  Your health care provider may ask you questions about your:  · Alcohol use.  · Tobacco use.  · Drug use.  · Emotional well-being.  · Home and relationship well-being.  · Sexual activity.  · Eating habits.  · History of falls.  · Memory and ability to understand (cognition).  · Work and work environment.  · Reproductive health.    Screening  You may have the following tests or measurements:  · Height, weight, and BMI.  · Blood pressure.  · Lipid and cholesterol levels. These may be checked every 5 years, or more frequently if you are over 50 years  old.  · Skin check.  · Lung cancer screening. You may have this screening every year starting at age 55 if you have a 30-pack-year history of smoking and currently smoke or have quit within the past 15 years.  · Fecal occult blood test (FOBT) of the stool. You may have this test every year starting at age 50.  · Flexible sigmoidoscopy or colonoscopy. You may have a sigmoidoscopy every 5 years or a colonoscopy every 10 years starting at age 50.  · Hepatitis C blood test.  · Hepatitis B blood test.  · Sexually transmitted disease (STD) testing.  · Diabetes screening. This is done by checking your blood sugar (glucose) after you have not eaten for a while (fasting). You may have this done every 1-3 years.  · Bone density scan. This is done to screen for osteoporosis. You may have this done starting at age 65.  · Mammogram. This may be done every 1-2 years. Talk to your health care provider about how often you should have regular mammograms.    Talk with your health care provider about your test results, treatment options, and if necessary, the need for more tests.  Vaccines  Your health care provider may recommend certain vaccines, such as:  · Influenza vaccine. This is recommended every year.  · Tetanus, diphtheria, and acellular pertussis (Tdap, Td) vaccine. You may need a Td booster every 10 years.  · Varicella vaccine. You may need this if you have not been vaccinated.  · Zoster vaccine. You may need this after age 60.  · Measles, mumps, and rubella (MMR) vaccine. You may need at least one dose of MMR if you were born in 1957 or later. You may also need a second dose.  · Pneumococcal 13-valent conjugate (PCV13) vaccine. One dose is recommended after age 65.  · Pneumococcal polysaccharide (PPSV23) vaccine. One dose is recommended after age 65.  · Meningococcal vaccine. You may need this if you have certain conditions.  · Hepatitis A vaccine. You may need this if you have certain conditions or if you travel or work  in places where you may be exposed to hepatitis A.  · Hepatitis B vaccine. You may need this if you have certain conditions or if you travel or work in places where you may be exposed to hepatitis B.  · Haemophilus influenzae type b (Hib) vaccine. You may need this if you have certain conditions.    Talk to your health care provider about which screenings and vaccines you need and how often you need them.  This information is not intended to replace advice given to you by your health care provider. Make sure you discuss any questions you have with your health care provider.  Document Released: 2017 Document Revised: 2018 Document Reviewed: 10/18/2016  [employer] Interactive Patient Education © 2019 [employer]      Medicare Wellness  Personal Prevention Plan of Service     Date of Office Visit:  2019  Encounter Provider:  Shannan Braden MD  Place of Service:  [facility]  Patient Name: Cassie Vasquez Jason  :  1955    As part of the Medicare Wellness portion of your visit today, we are providing you with this personalized preventive plan of services (PPPS). This plan is based upon recommendations of the [place] Preventive Services Task Force (USPSTF) and the Advisory Committee on Immunization Practices (ACIP).    This lists the preventive care services that should be considered, and provides dates of when you are due. Items listed as completed are up-to-date and do not require any further intervention.    Health Maintenance   Topic Date Due   • TDAP/TD VACCINES (1 - Tdap) 1973   • MEDICARE ANNUAL WELLNESS  2017   • ZOSTER VACCINE (2 of 3) 2017   • PAP SMEAR  2018   • LIPID PANEL  2019   • DIABETIC FOOT EXAM  2019   • DIABETIC EYE EXAM  2019   • INFLUENZA VACCINE  2019   • HEMOGLOBIN A1C  2019   • PNEUMOCOCCAL VACCINES (65+ LOW/MEDIUM RISK) (2 of 2 - PPSV23) 2020   • URINE MICROALBUMIN  2020    • MAMMOGRAM  01/10/2021   • COLONOSCOPY  01/12/2021   • HEPATITIS C SCREENING  Completed       Orders Placed This Encounter   Procedures   • Pneumococcal Conjugate Vaccine 13-Valent All   • POC Glycated Hemoglobin, Total   • POC Microalbumin   • ECG 12 Lead     This order was created via procedure documentation       Return in about 3 months (around 8/21/2019).

## 2019-05-21 NOTE — PROGRESS NOTES
QUICK REFERENCE INFORMATION:  The ABCs of the Annual Wellness Visit    Welcome to Medicare Visit    HEALTH RISK ASSESSMENT    : 1955    Recent Hospitalizations:  No hospitalization(s) within the last year..  ccc      Current Medical Providers:  Patient Care Team:  Shannan Braden MD as PCP - General (Family Medicine)  Lew Jean-Baptiste MD as Consulting Physician (Neurology)  Judy Soliz MD as Surgeon (General Surgery)  Ching Prado MD as Consulting Physician (Obstetrics and Gynecology)        Smoking Status:  Social History     Tobacco Use   Smoking Status Never Smoker   Smokeless Tobacco Never Used       Alcohol Consumption:  Social History     Substance and Sexual Activity   Alcohol Use No       Depression Screen:   PHQ-2/PHQ-9 Depression Screening 2019   Little interest or pleasure in doing things 0   Feeling down, depressed, or hopeless -   Total Score 0       Health Habits and Functional and Cognitive Screening:  Functional & Cognitive Status 2019   Do you have difficulty preparing food and eating? No   Do you have difficulty bathing yourself, getting dressed or grooming yourself? No   Do you have difficulty using the toilet? No   Do you have difficulty moving around from place to place? No   Do you have trouble with steps or getting out of a bed or a chair? No   In the past year have you fallen or experienced a near fall? No   Current Diet Diabetic Diet   Dental Exam Not up to date   Eye Exam Up to date   Exercise (times per week) 0 times per week   Current Exercise Activities Include None   Do you need help using the phone?  No   Are you deaf or do you have serious difficulty hearing?  No   Do you need help with transportation? No   Do you need help shopping? No   Do you need help preparing meals?  No   Do you need help with housework?  No   Do you need help with laundry? No   Do you need help taking your medications? No   Do you need help managing money? No   Do you ever  drive or ride in a car without wearing a seat belt? No   Have you felt unusual stress, anger or loneliness in the last month? No   Who do you live with? Spouse   If you need help, do you have trouble finding someone available to you? No   Have you been bothered in the last four weeks by sexual problems? No   Do you have difficulty concentrating, remembering or making decisions? Yes       Visual Acuity:  No exam data present; pt declined  ;  Does the patient have evidence of cognitive impairment? Yes, seen by neuro for dementia felt to be due to Alzhemiers    Asiprin use counseling: Does not need ASA (and currently is not on it)      Recent Lab Results:    Lab Results   Component Value Date     (H) 05/24/2018     Lab Results   Component Value Date    HGBA1C 8.1 05/21/2019     Lab Results   Component Value Date    TRIG 104 05/24/2018    HDL 64 05/24/2018    VLDL 21 05/24/2018           Age-appropriate Screening Schedule:  Refer to the list below for future screening recommendations based on patient's age, sex and/or medical conditions. Orders for these recommended tests are listed in the plan section. The patient has been provided with a written plan.    Health Maintenance   Topic Date Due   • TDAP/TD VACCINES (1 - Tdap) 02/03/1973   • ZOSTER VACCINE (2 of 3) 11/21/2017   • PAP SMEAR  03/18/2018   • LIPID PANEL  05/24/2019   • DIABETIC FOOT EXAM  05/26/2019   • DIABETIC EYE EXAM  06/14/2019   • INFLUENZA VACCINE  08/01/2019   • HEMOGLOBIN A1C  11/21/2019   • PNEUMOCOCCAL VACCINES (65+ LOW/MEDIUM RISK) (2 of 2 - PPSV23) 05/21/2020   • URINE MICROALBUMIN  05/21/2020   • MAMMOGRAM  01/10/2021   • COLONOSCOPY  01/12/2021        Subjective   History of Present Illness    Cassie Gomez is a 64 y.o. female who presents for an Annual Wellness Visit.    She has HTN which has been stable on cozaar. Denies side effects. No CP, palpitations, orthopnea, swelling.    She has HLP for which she is on statin. Tolerating  well.    She has NIDDM which has been well controlled. Getting irregular exercise. Fair job of following diabetic diet. She is UTD on eye exam. tolearting metformin well.    She has IBS with mixed constipation and diarrhea. Denies recent flares. Denies abd pain or blood in stool.    Has acquired hypoTH. Taking replacement as directed. Some fatigue. Otherwise stable.    Has chronic depression as well as alzhemers dementia. Has had recent f/u with neurology. On zoloft, aricept, namenda. Taking as directed. Denies side effects. No recent falls or injuries.    The following portions of the patient's history were reviewed and updated as appropriate: allergies, current medications, past family history, past medical history, past social history, past surgical history and problem list.    Outpatient Medications Prior to Visit   Medication Sig Dispense Refill   • citalopram (CeleXA) 10 MG tablet      • Cholecalciferol (VITAMIN D3) 5000 UNITS capsule capsule Take 5,000 Units by mouth Daily.     • Cyanocobalamin (VITAMIN B-12) 500 MCG sublingual tablet      • donepezil (ARICEPT) 10 MG tablet Take 1 tablet by mouth Daily. 90 tablet 3   • levothyroxine (SYNTHROID, LEVOTHROID) 50 MCG tablet Take 1 tablet by mouth Daily. 90 tablet 0   • losartan (COZAAR) 100 MG tablet Take 1 tablet by mouth Daily. 90 tablet 1   • lovastatin (MEVACOR) 40 MG tablet Take 1 tablet by mouth Every Night. 90 tablet 1   • memantine (NAMENDA) 10 MG tablet Take 1 tablet by mouth 2 (Two) Times a Day. 180 tablet 3   • Multiple Vitamins-Minerals (PRESERVISION AREDS 2+MULTI VIT PO)      • sertraline (ZOLOFT) 25 MG tablet Take 1 tablet by mouth Daily. 30 tablet 11   • metFORMIN (GLUCOPHAGE) 500 MG tablet Take 1 tablet by mouth 2 (Two) Times a Day With Meals. 180 tablet 0     No facility-administered medications prior to visit.        Patient Active Problem List   Diagnosis   • Late onset Alzheimer's disease without behavioral disturbance   • Thyroid disease   •  Type 2 diabetes mellitus without complication, without long-term current use of insulin (CMS/[facility])   • Acquired hypothyroidism   • Hyperlipidemia   • Vitamin B 12 deficiency   • Irritable bowel syndrome with both constipation and diarrhea   • Essential hypertension   • Depressive disorder       Advance Care Planning:  Patient does not have an advance directive - information provided to the patient today    Identification of Risk Factors:  Risk factors include: weight , unhealthy diet, cardiovascular risk, inactivity, depression and cognitive impairment.    Review of Systems   Constitutional: Positive for fatigue and unexpected weight change. Negative for appetite change and fever.   HENT: Negative for congestion, mouth sores, nosebleeds, rhinorrhea, sore throat and trouble swallowing.    Eyes: Negative for visual disturbance.   Respiratory: Negative for cough, shortness of breath and wheezing.    Cardiovascular: Negative for chest pain, palpitations and leg swelling.   Gastrointestinal: Negative for abdominal pain, blood in stool, constipation, diarrhea, nausea and vomiting.   Endocrine: Negative for heat intolerance, polydipsia and polyuria.   Genitourinary: Negative for dysuria and hematuria.   Musculoskeletal: Negative for arthralgias and myalgias.   Skin: Negative for rash and wound.   Neurological: Negative for dizziness, tremors, syncope, weakness and headaches.   Hematological: Negative for adenopathy. Does not bruise/bleed easily.   Psychiatric/Behavioral: Positive for confusion, decreased concentration and dysphoric mood. Negative for sleep disturbance and suicidal ideas. The patient is not nervous/anxious.         Decreased memory       Compared to one year ago, the patient feels her physical health is the same.  Compared to one year ago, the patient feels her mental health is the same.    Objective     Physical Exam   Constitutional: She is oriented to person, place, and time. She appears well-developed  "and well-nourished. She is cooperative. She does not appear ill. No distress.   obese   HENT:   Head: Normocephalic and atraumatic.   Mouth/Throat: Oropharynx is clear and moist and mucous membranes are normal. Mucous membranes are not dry. No oral lesions.   Eyes: Conjunctivae and lids are normal. Right eye exhibits no nystagmus. Left eye exhibits no nystagmus.   Neck: Phonation normal. Neck supple. Normal carotid pulses present. Carotid bruit is not present. Thyromegaly (mild on right) present. No thyroid mass present.   Cardiovascular: Normal rate, regular rhythm, S1 normal, S2 normal and intact distal pulses. Exam reveals no gallop.   No murmur heard.  Pulmonary/Chest: Effort normal and breath sounds normal.   Musculoskeletal: She exhibits no edema, tenderness or deformity.     Vascular Status -  Her right foot exhibits no edema. Her left foot exhibits no edema.  Lymphadenopathy:     She has no cervical adenopathy.   Neurological: She is alert and oriented to person, place, and time. She displays no tremor. Gait normal.   Skin: Skin is warm and dry. No ecchymosis and no rash noted.   Psychiatric: Her speech is normal. Thought content normal. Her affect is blunt. She is slowed (mildly). She exhibits abnormal remote memory.   Good eye contact. Answers questions appropriately. Good personal hygiene and grooming. Gives limited answers, generally \"yes\", \"no\".   Nursing note and vitals reviewed.      Vitals:    05/21/19 1026   BP: 128/74   Pulse: 67   SpO2: 98%   Weight: 106 kg (233 lb)   Height: 172.7 cm (68\")   PainSc: 0-No pain       Patient's Body mass index is 35.43 kg/m². BMI is above normal parameters. Recommendations include: educational material, exercise counseling and nutrition counseling.      Procedure     ECG 12 Lead  Date/Time: 5/21/2019 11:20 AM  Performed by: Shannan Braden MD  Authorized by: Shannan Braden MD   Comparison: not compared with previous ECG   Previous ECG: no previous ECG " available  Rhythm: sinus rhythm  Ectopy comments: none  Rate: normal  BPM: 65  Conduction: conduction normal  ST Segments: ST segments normal  T Waves: T waves normal  T inversion: III  T flattening: aVF  QRS axis: normal  Other: no other findings    Clinical impression: normal ECG  Comments: NJ int: 127 ms  QRS dur: 86 ms  QTc: 501 ms               Assessment/Plan   Patient Self-Management and Personalized Health Advice  The patient has been provided with information about: diet, exercise, weight management, prevention of cardiac or vascular disease, the relationship between weight and GERD, designing advance directives and mental health concerns and preventive services including:   · Advance directive, Counseling for cardiovascular disease risk reduction, Exercise counseling provided, Fall Risk assessment done, Fall Risk plan of care done, Nutrition counseling provided, Pneumococcal vaccine , Screening electrocardiogram.    Visit Diagnoses:    ICD-10-CM ICD-9-CM   1. Welcome to Medicare preventive visit Z00.00 V70.0   2. Type 2 diabetes mellitus without complication, without long-term current use of insulin (CMS/[facility]) E11.9 250.00   3. Encounter for screening for cardiovascular disorders Z13.6 V81.2   4. Need for pneumococcal vaccination Z23 V03.82   5. Essential hypertension I10 401.9   6. Mixed hyperlipidemia E78.2 272.2   7. Irritable bowel syndrome with both constipation and diarrhea K58.2 564.1   8. Acquired hypothyroidism E03.9 244.9   9. Late onset Alzheimer's disease without behavioral disturbance G30.1 331.0    F02.80 294.10   10. Depressive disorder F32.9 311       Orders Placed This Encounter   Procedures   • Pneumococcal Conjugate Vaccine 13-Valent All   • POC Glycated Hemoglobin, Total   • POC Microalbumin   • ECG 12 Lead     This order was created via procedure documentation       Outpatient Encounter Medications as of 5/21/2019   Medication Sig Dispense Refill   • [DISCONTINUED] citalopram (CeleXA)  10 MG tablet      • Cholecalciferol (VITAMIN D3) 5000 UNITS capsule capsule Take 5,000 Units by mouth Daily.     • Cyanocobalamin (VITAMIN B-12) 500 MCG sublingual tablet      • donepezil (ARICEPT) 10 MG tablet Take 1 tablet by mouth Daily. 90 tablet 3   • levothyroxine (SYNTHROID, LEVOTHROID) 50 MCG tablet Take 1 tablet by mouth Daily. 90 tablet 0   • losartan (COZAAR) 100 MG tablet Take 1 tablet by mouth Daily. 90 tablet 1   • lovastatin (MEVACOR) 40 MG tablet Take 1 tablet by mouth Every Night. 90 tablet 1   • memantine (NAMENDA) 10 MG tablet Take 1 tablet by mouth 2 (Two) Times a Day. 180 tablet 3   • metFORMIN (GLUCOPHAGE) 500 MG tablet Take 2 tablets by mouth 2 (Two) Times a Day With Meals. 360 tablet 0   • Multiple Vitamins-Minerals (PRESERVISION AREDS 2+MULTI VIT PO)      • sertraline (ZOLOFT) 25 MG tablet Take 1 tablet by mouth Daily. 30 tablet 11   • [DISCONTINUED] metFORMIN (GLUCOPHAGE) 500 MG tablet Take 1 tablet by mouth 2 (Two) Times a Day With Meals. 180 tablet 0     No facility-administered encounter medications on file as of 5/21/2019.        Reviewed use of high risk medication in the elderly: not applicable  Reviewed for potential of harmful drug interactions in the elderly: not applicable    Diabetes uncontrolled with increased A1c from last visit. insturcted to increase metformin to 2 po bid. Patient encouraged to take meds as rx'd, follow diabetic appropriate diet, exercise daily, perform feet check daily, and have yearly diabetic eye exams.    Other chronic medical problems appear stable. Doing well on current meds. No changes made today.    Follow Up:  Return in about 3 months (around 8/21/2019).   Patient was encouraged to keep me informed of any acute changes, lack of improvement, or any new concerning symptoms.  Pt is aware of reasons to seek emergent care.  She will f/u sooner as needed.  She is encouraged to keep regular f/u with neurology.  todays instructions and med changes provided  to family member as well.    An After Visit Summary and PPPS with all of these plans were given to the patient.

## 2019-05-29 ENCOUNTER — OFFICE VISIT (OUTPATIENT)
Dept: NEUROLOGY | Facility: CLINIC | Age: 64
End: 2019-05-29

## 2019-05-29 VITALS
DIASTOLIC BLOOD PRESSURE: 76 MMHG | SYSTOLIC BLOOD PRESSURE: 138 MMHG | WEIGHT: 233 LBS | BODY MASS INDEX: 35.31 KG/M2 | HEIGHT: 68 IN

## 2019-05-29 DIAGNOSIS — G30.1 LATE ONSET ALZHEIMER'S DISEASE WITHOUT BEHAVIORAL DISTURBANCE (HCC): Primary | ICD-10-CM

## 2019-05-29 DIAGNOSIS — F02.80 LATE ONSET ALZHEIMER'S DISEASE WITHOUT BEHAVIORAL DISTURBANCE (HCC): Primary | ICD-10-CM

## 2019-05-29 PROCEDURE — 99214 OFFICE O/P EST MOD 30 MIN: CPT | Performed by: PSYCHIATRY & NEUROLOGY

## 2019-05-29 RX ORDER — DONEPEZIL HYDROCHLORIDE 10 MG/1
10 TABLET, FILM COATED ORAL 2 TIMES DAILY
Qty: 180 TABLET | Refills: 3 | Status: SHIPPED | OUTPATIENT
Start: 2019-05-29 | End: 2019-08-14 | Stop reason: SDUPTHER

## 2019-05-29 NOTE — PROGRESS NOTES
"Subjective     Chief Complaint: memory loss      History of Present Illness   Cassie Gomez is a 64 y.o. female who returns to clinic today with a history of Mild Cognitive Impairment (MCI) . Her  has noted symptoms since approximately mid-2016 marked largely by forgetfulness. This has gradually worsened over time. Additional associated symptoms have included impairments in orientation and executive function.      Prior evaluation has included screening blood work  and an MRI of the brain which were unremarkable. She is currently taking donepezil 10 mg and memantine.     Since her last visit on 3/13/19, her depression has improved with the addition of sertraline, though it is noted that she lost her mother about 3 weeks ago. She continues to have impairments in memory and executive function. She continues to live in [place] with her .    I have reviewed and confirmed the past family, social and medical history as accurate on 5/29/19.     Review of Systems   Constitutional: Negative.        Objective     /76   Ht 172.7 cm (68\")   Wt 106 kg (233 lb)   BMI 35.43 kg/m²     General appearance today is normal.     Physical Exam   Neurological: She has normal strength.   Psychiatric: Her speech is normal.        Neurologic Exam     Mental Status   Oriented to person.   Disoriented to place.   Disoriented to time.   Registration: recalls 3 of 3 objects. Recall of objects at 5 minutes: 0/3. Follows 3 step commands.   Attention: normal.   Speech: speech is normal   Level of consciousness: alert  Able to name object. Able to read. Able to repeat. Able to write. Normal comprehension.     Cranial Nerves   Cranial nerves II through XII intact.     Motor Exam   Muscle bulk: normal  Overall muscle tone: normal    Strength   Strength 5/5 throughout.         Results  MMSE=15      Assessment/Plan   Cassie was seen today for follow-up.    Diagnoses and all orders for this visit:    Late onset Alzheimer's " disease without behavioral disturbance    Other orders  -     donepezil (ARICEPT) 10 MG tablet; Take 1 tablet by mouth 2 (Two) Times a Day.          Discussion/Summary   Cassie Gomez returns to clinic today with a history and examination most consistent with Alzheimer's Disease . I again reviewed her current status and treatment options. After discussing potential treatment options, it was elected to continue on memantine and sertraline unchanged and increase donepezil to 10 mg bid on a trial basis (I did discuss potential risks and benefits including GI distress). She will then follow up in 6 months, or sooner if needed.     I spent 25 minutes face to face with the patient and family. I spent 15 minutes counseling and discussing current status, treatment options and management.    As part of this visit I obtained additional history from the family which is incorporated in the HPI.      Lew Jean-Baptiste MD

## 2019-06-03 RX ORDER — SERTRALINE HYDROCHLORIDE 25 MG/1
25 TABLET, FILM COATED ORAL DAILY
Qty: 30 TABLET | Refills: 11 | Status: SHIPPED | OUTPATIENT
Start: 2019-06-03 | End: 2019-07-08 | Stop reason: SDUPTHER

## 2019-06-03 RX ORDER — MEMANTINE HYDROCHLORIDE 10 MG/1
10 TABLET ORAL 2 TIMES DAILY
Qty: 180 TABLET | Refills: 3 | Status: SHIPPED | OUTPATIENT
Start: 2019-06-03 | End: 2019-08-14 | Stop reason: SDUPTHER

## 2019-06-03 NOTE — TELEPHONE ENCOUNTER
memantine (NAMENDA) 10 MG tablet [[id number]]   Order Details   Dose: 10 mg Route: Oral Frequency: 2 Times Daily   Note to Pharmacy:   Please consider 90 day supplies to promote better adherence        Dispense Quantity: 180 tablet Refills: 3 Fills remaining: --           Sig: Take 1 tablet by mouth 2 (Two) Times a Day.        sertraline (ZOLOFT) 25 MG tablet [[id number]]   Order Details   Dose: 25 mg Route: Oral Frequency: Daily   Dispense Quantity: 30 tablet Refills: 11 Fills remaining: --           Sig: Take 1 tablet by mouth Daily.        Sent to [facility]

## 2019-07-08 RX ORDER — SERTRALINE HYDROCHLORIDE 25 MG/1
25 TABLET, FILM COATED ORAL DAILY
Qty: 30 TABLET | Refills: 11 | Status: SHIPPED | OUTPATIENT
Start: 2019-07-08 | End: 2019-07-30 | Stop reason: SDUPTHER

## 2019-07-31 RX ORDER — SERTRALINE HYDROCHLORIDE 25 MG/1
25 TABLET, FILM COATED ORAL DAILY
Qty: 30 TABLET | Refills: 11 | Status: SHIPPED | OUTPATIENT
Start: 2019-07-31 | End: 2019-08-14 | Stop reason: SDUPTHER

## 2019-07-31 RX ORDER — LOVASTATIN 40 MG/1
40 TABLET ORAL NIGHTLY
Qty: 90 TABLET | Refills: 1 | Status: SHIPPED | OUTPATIENT
Start: 2019-07-31 | End: 2019-10-15 | Stop reason: SDUPTHER

## 2019-07-31 RX ORDER — LOSARTAN POTASSIUM 100 MG/1
100 TABLET ORAL DAILY
Qty: 90 TABLET | Refills: 1 | Status: SHIPPED | OUTPATIENT
Start: 2019-07-31 | End: 2019-10-15 | Stop reason: SDUPTHER

## 2019-08-14 RX ORDER — DONEPEZIL HYDROCHLORIDE 10 MG/1
10 TABLET, FILM COATED ORAL 2 TIMES DAILY
Qty: 180 TABLET | Refills: 3 | Status: SHIPPED | OUTPATIENT
Start: 2019-08-14 | End: 2019-10-29 | Stop reason: SDUPTHER

## 2019-08-14 RX ORDER — MEMANTINE HYDROCHLORIDE 10 MG/1
10 TABLET ORAL 2 TIMES DAILY
Qty: 180 TABLET | Refills: 3 | Status: SHIPPED | OUTPATIENT
Start: 2019-08-14 | End: 2019-10-29 | Stop reason: SDUPTHER

## 2019-08-14 RX ORDER — SERTRALINE HYDROCHLORIDE 25 MG/1
25 TABLET, FILM COATED ORAL DAILY
Qty: 30 TABLET | Refills: 11 | Status: SHIPPED | OUTPATIENT
Start: 2019-08-14 | End: 2019-10-05 | Stop reason: SDUPTHER

## 2019-08-21 ENCOUNTER — OFFICE VISIT (OUTPATIENT)
Dept: FAMILY MEDICINE CLINIC | Facility: CLINIC | Age: 64
End: 2019-08-21

## 2019-08-21 VITALS
WEIGHT: 225.38 LBS | DIASTOLIC BLOOD PRESSURE: 80 MMHG | HEIGHT: 68 IN | OXYGEN SATURATION: 97 % | HEART RATE: 88 BPM | TEMPERATURE: 97.2 F | BODY MASS INDEX: 34.16 KG/M2 | SYSTOLIC BLOOD PRESSURE: 115 MMHG

## 2019-08-21 DIAGNOSIS — E11.9 TYPE 2 DIABETES MELLITUS WITHOUT COMPLICATION, WITHOUT LONG-TERM CURRENT USE OF INSULIN (HCC): ICD-10-CM

## 2019-08-21 DIAGNOSIS — F02.80 LATE ONSET ALZHEIMER'S DISEASE WITHOUT BEHAVIORAL DISTURBANCE (HCC): ICD-10-CM

## 2019-08-21 DIAGNOSIS — I10 ESSENTIAL HYPERTENSION: Primary | ICD-10-CM

## 2019-08-21 DIAGNOSIS — E03.9 ACQUIRED HYPOTHYROIDISM: ICD-10-CM

## 2019-08-21 DIAGNOSIS — K58.2 IRRITABLE BOWEL SYNDROME WITH BOTH CONSTIPATION AND DIARRHEA: ICD-10-CM

## 2019-08-21 DIAGNOSIS — Z51.81 MEDICATION MONITORING ENCOUNTER: ICD-10-CM

## 2019-08-21 DIAGNOSIS — G30.1 LATE ONSET ALZHEIMER'S DISEASE WITHOUT BEHAVIORAL DISTURBANCE (HCC): ICD-10-CM

## 2019-08-21 DIAGNOSIS — E78.2 MIXED HYPERLIPIDEMIA: ICD-10-CM

## 2019-08-21 DIAGNOSIS — F32.A DEPRESSIVE DISORDER: ICD-10-CM

## 2019-08-21 PROCEDURE — 99214 OFFICE O/P EST MOD 30 MIN: CPT | Performed by: FAMILY MEDICINE

## 2019-08-21 NOTE — PROGRESS NOTES
Subjective   Cassie Gomez is a 64 y.o. female.     History of Present Illness   Mrs. Gomez presents today for routine follow-up on several chronic medical problems.  Last visit 3 months ago.    She has HTN which has been stable on cozaar. Denies side effects. No CP, palpitations, orthopnea, swelling.     She has HLP for which she is on statin. Tolerating well.     She has NIDDM which has been well controlled. Getting irregular exercise. Fair job of following diabetic diet. She is not UTD on eye exam.  Tolerating metformin well.     She has IBS with mixed constipation and diarrhea. Denies recent flares. Denies abd pain or blood in stool.     Has acquired hypoTH. Taking replacement as directed. Some fatigue. Otherwise stable.     Has chronic depression as well as alzhemers dementia. Has had recent f/u with neurology. On zoloft, aricept, namenda. Taking as directed. Denies side effects. No recent falls or injuries.    Pt's previous HPI reviewed and updated as indicated.     The following portions of the patient's history were reviewed and updated as appropriate: allergies, current medications, past family history, past medical history, past social history, past surgical history and problem list.    Review of Systems   Constitutional: Positive for fatigue and unexpected weight change. Negative for appetite change and fever.   HENT: Negative for congestion, mouth sores, nosebleeds, rhinorrhea, sore throat and trouble swallowing.    Eyes: Negative for visual disturbance.   Respiratory: Negative for cough, shortness of breath and wheezing.    Cardiovascular: Negative for chest pain, palpitations and leg swelling.   Gastrointestinal: Negative for abdominal pain, blood in stool, constipation, diarrhea, nausea and vomiting.   Endocrine: Negative for heat intolerance, polydipsia and polyuria.   Genitourinary: Negative for dysuria and hematuria.   Musculoskeletal: Negative for arthralgias and myalgias.   Skin: Positive for  wound (right great toenail). Negative for rash.   Neurological: Negative for dizziness, tremors, syncope, weakness and headaches.   Hematological: Negative for adenopathy. Does not bruise/bleed easily.   Psychiatric/Behavioral: Positive for confusion, decreased concentration and dysphoric mood. Negative for sleep disturbance and suicidal ideas. The patient is not nervous/anxious.         Decreased memory   Pt's previous ROS reviewed and updated as indicated.     Objective    Vitals:    08/21/19 1306   BP: 115/80   Pulse: 88   Temp: 97.2 °F (36.2 °C)   SpO2: 97%     Body mass index is 34.28 kg/m².      08/21/19  1306   Weight: 102 kg (225 lb 6 oz)       Physical Exam   Constitutional: She is oriented to person, place, and time. She appears well-developed and well-nourished. She is cooperative. She does not appear ill. No distress.   obese   HENT:   Head: Normocephalic and atraumatic.   Mouth/Throat: Oropharynx is clear and moist and mucous membranes are normal. Mucous membranes are not dry. No oral lesions.   Eyes: Conjunctivae and lids are normal. Right eye exhibits no nystagmus. Left eye exhibits no nystagmus.   Neck: Phonation normal. Neck supple. Normal carotid pulses present. Carotid bruit is not present. Thyromegaly (mild on right) present. No thyroid mass present.   Cardiovascular: Normal rate, regular rhythm, S1 normal, S2 normal and intact distal pulses. Exam reveals no gallop.   No murmur heard.  Pulmonary/Chest: Effort normal and breath sounds normal.   Musculoskeletal: She exhibits no edema, tenderness or deformity.     Vascular Status -  Her right foot exhibits no edema. Her left foot exhibits no edema.  Lymphadenopathy:     She has no cervical adenopathy.   Neurological: She is alert and oriented to person, place, and time. She has normal strength. She displays no tremor. Gait normal.   Skin: Skin is warm and dry. No ecchymosis and no rash noted.   Trauma to right great toenail, appears to have been  "avulsion of lateral part of nail. NTTP, no purulent drainage.    Psychiatric: Her speech is normal. Thought content normal. Her affect is blunt. She is slowed (mildly). She exhibits abnormal remote memory.   Good eye contact. Answers questions appropriately. Good personal hygiene and grooming. Gives limited answers, generally \"yes\", \"no\".   Nursing note and vitals reviewed.  Pt's previous physical exam reviewed and updated as indicated.      Assessment/Plan   Cassie was seen today for diabetes and nail problem.    Diagnoses and all orders for this visit:    Essential hypertension  -     CBC (No Diff)  -     Comprehensive Metabolic Panel    Mixed hyperlipidemia  -     Comprehensive Metabolic Panel  -     Lipid Panel    Type 2 diabetes mellitus without complication, without long-term current use of insulin (CMS/[facility])  -     Comprehensive Metabolic Panel  -     Hemoglobin A1c    Acquired hypothyroidism  -     TSH Rfx On Abnormal To Free T4    Medication monitoring encounter  -     CBC (No Diff)  -     Comprehensive Metabolic Panel    Irritable bowel syndrome with both constipation and diarrhea    Late onset Alzheimer's disease without behavioral disturbance    Depressive disorder       Hypertension controlled, continue Cozaar.  Assess status of mixed hyperlipidemia as above.  Continue lovastatin 40 mg once daily.  She is reminded of importance of heart healthy diet and daily exercise.    NIDDM historically well controlled.  Continue metformin.  A1c as above, adjust treatment as indicated. Reviewed importance of dietary and activity compliance as well as medication compliance in the management of diabetes mellitus. Pt voiced understanding.     Appears euthyroid.  Surveillance labs as above.  Adjust replacement as indicated.    IBS stable.    Alzheimer's dementia in association with depressive disorder generally appearing stable.  Follow-up with neurology as scheduled.  Continue Aricept, Namenda and Zoloft.    She is " reminded she is overdue for eye exam. Reminder letter sent as well.    Appropriate wound care reviewed for management of broken right great toenail.  She will keep me informed of any purulent drainage, pain etc.    Routine follow-up in 3 months, sooner as needed/instructed.  I will contact patient regarding test results and provide instructions regarding any necessary changes in plan of care.  Patient was encouraged to keep me informed of any acute changes, or any new concerning symptoms.  Pt is aware of reasons to seek emergent care.  Patient voiced understanding of all instructions and denied further questions.            Please note that portions of this note may have been completed with a voice recognition program. Efforts were made to edit the dictations, but occasionally words are mistranscribed.

## 2019-08-22 LAB
ALBUMIN SERPL-MCNC: 4.6 G/DL (ref 3.5–5.2)
ALBUMIN/GLOB SERPL: 1.7 G/DL
ALP SERPL-CCNC: 120 U/L (ref 39–117)
ALT SERPL-CCNC: 15 U/L (ref 1–33)
AST SERPL-CCNC: 21 U/L (ref 1–32)
BILIRUB SERPL-MCNC: 0.3 MG/DL (ref 0.2–1.2)
BUN SERPL-MCNC: 8 MG/DL (ref 8–23)
BUN/CREAT SERPL: 9.4 (ref 7–25)
CALCIUM SERPL-MCNC: 9.7 MG/DL (ref 8.6–10.5)
CHLORIDE SERPL-SCNC: 100 MMOL/L (ref 98–107)
CHOLEST SERPL-MCNC: 139 MG/DL (ref 0–200)
CO2 SERPL-SCNC: 25.2 MMOL/L (ref 22–29)
CREAT SERPL-MCNC: 0.85 MG/DL (ref 0.57–1)
ERYTHROCYTE [DISTWIDTH] IN BLOOD BY AUTOMATED COUNT: 15.1 % (ref 12.3–15.4)
GLOBULIN SER CALC-MCNC: 2.7 GM/DL
GLUCOSE SERPL-MCNC: 156 MG/DL (ref 65–99)
HBA1C MFR BLD: 7.8 % (ref 4.8–5.6)
HCT VFR BLD AUTO: 46.1 % (ref 34–46.6)
HDLC SERPL-MCNC: 62 MG/DL (ref 40–60)
HGB BLD-MCNC: 13.4 G/DL (ref 12–15.9)
LDLC SERPL CALC-MCNC: 59 MG/DL (ref 0–100)
MCH RBC QN AUTO: 26.5 PG (ref 26.6–33)
MCHC RBC AUTO-ENTMCNC: 29.1 G/DL (ref 31.5–35.7)
MCV RBC AUTO: 91.3 FL (ref 79–97)
PLATELET # BLD AUTO: 411 10*3/MM3 (ref 140–450)
POTASSIUM SERPL-SCNC: 4.3 MMOL/L (ref 3.5–5.2)
PROT SERPL-MCNC: 7.3 G/DL (ref 6–8.5)
RBC # BLD AUTO: 5.05 10*6/MM3 (ref 3.77–5.28)
SODIUM SERPL-SCNC: 143 MMOL/L (ref 136–145)
TRIGL SERPL-MCNC: 88 MG/DL (ref 0–150)
TSH SERPL DL<=0.005 MIU/L-ACNC: 1.08 MIU/ML (ref 0.27–4.2)
VLDLC SERPL CALC-MCNC: 17.6 MG/DL
WBC # BLD AUTO: 9.78 10*3/MM3 (ref 3.4–10.8)

## 2019-10-07 RX ORDER — SERTRALINE HYDROCHLORIDE 25 MG/1
25 TABLET, FILM COATED ORAL DAILY
Qty: 30 TABLET | Refills: 11 | Status: SHIPPED | OUTPATIENT
Start: 2019-10-07 | End: 2019-10-29 | Stop reason: SDUPTHER

## 2019-10-15 RX ORDER — LOVASTATIN 40 MG/1
40 TABLET ORAL NIGHTLY
Qty: 90 TABLET | Refills: 1 | Status: SHIPPED | OUTPATIENT
Start: 2019-10-15 | End: 2020-02-10 | Stop reason: SDUPTHER

## 2019-10-15 RX ORDER — LOSARTAN POTASSIUM 100 MG/1
100 TABLET ORAL DAILY
Qty: 90 TABLET | Refills: 1 | Status: SHIPPED | OUTPATIENT
Start: 2019-10-15 | End: 2020-02-10 | Stop reason: SDUPTHER

## 2019-10-30 RX ORDER — DONEPEZIL HYDROCHLORIDE 10 MG/1
10 TABLET, FILM COATED ORAL 2 TIMES DAILY
Qty: 180 TABLET | Refills: 3 | Status: SHIPPED | OUTPATIENT
Start: 2019-10-30 | End: 2020-03-24 | Stop reason: SDUPTHER

## 2019-10-30 RX ORDER — SERTRALINE HYDROCHLORIDE 25 MG/1
25 TABLET, FILM COATED ORAL DAILY
Qty: 30 TABLET | Refills: 11 | Status: SHIPPED | OUTPATIENT
Start: 2019-10-30 | End: 2019-11-19

## 2019-10-30 RX ORDER — MEMANTINE HYDROCHLORIDE 10 MG/1
10 TABLET ORAL 2 TIMES DAILY
Qty: 180 TABLET | Refills: 3 | Status: SHIPPED | OUTPATIENT
Start: 2019-10-30 | End: 2020-03-24 | Stop reason: SDUPTHER

## 2019-11-19 ENCOUNTER — OFFICE VISIT (OUTPATIENT)
Dept: NEUROLOGY | Facility: CLINIC | Age: 64
End: 2019-11-19

## 2019-11-19 VITALS — HEIGHT: 68 IN | BODY MASS INDEX: 34.08 KG/M2 | WEIGHT: 224.87 LBS

## 2019-11-19 DIAGNOSIS — F02.80 LATE ONSET ALZHEIMER'S DISEASE WITHOUT BEHAVIORAL DISTURBANCE (HCC): Primary | ICD-10-CM

## 2019-11-19 DIAGNOSIS — G30.1 LATE ONSET ALZHEIMER'S DISEASE WITHOUT BEHAVIORAL DISTURBANCE (HCC): Primary | ICD-10-CM

## 2019-11-19 PROCEDURE — 99214 OFFICE O/P EST MOD 30 MIN: CPT | Performed by: PHYSICIAN ASSISTANT

## 2019-11-19 NOTE — PROGRESS NOTES
"Subjective     Chief Complaint: memory loss      History of Present Illness   Cassie Gomez is a 64 y.o. female who returns to clinic today with a history of Alzheimer's Disease. Her  has noted symptoms since approximately mid-2016 marked largely by forgetfulness. This has gradually worsened over time. Additional associated symptoms have included impairments in orientation and executive function.      Prior evaluation has included screening blood work  and an MRI of the brain which were unremarkable. She is currently taking donepezil 10 mg BID and memantine.    Today: Since her last visit in 5/19, she feels essentially unchanged. Her family has noted some cognitive decline as well as increased depression.       I have reviewed and confirmed the past family, social and medical history as accurate on 11/19/19.     Review of Systems   Constitutional: Negative.    HENT: Negative.    Eyes: Negative.    Respiratory: Negative.    Cardiovascular: Negative.    Gastrointestinal: Negative.    Endocrine: Negative.    Genitourinary: Negative.    Musculoskeletal: Negative.    Skin: Negative.    Allergic/Immunologic: Negative.    Neurological:        Memory loss    Hematological: Negative.        Objective     Ht 172.7 cm (67.99\")   Wt 102 kg (224 lb 13.9 oz)   BMI 34.20 kg/m²     General appearance today is normal.       Physical Exam   Neurological: She has normal strength. She has a normal Finger-Nose-Finger Test.   Psychiatric: Her speech is normal.        Neurologic Exam     Mental Status   Oriented to person.   Disoriented to place.   Disoriented to time.   Registration: recalls 3 of 3 objects. Recall at 5 minutes: recalls 2 of 3 objects. Follows 3 step commands.   Attention: decreased.   Speech: speech is normal   Level of consciousness: alert  Able to name object. Able to read. Able to repeat. Able to write. Normal comprehension.     Cranial Nerves   Cranial nerves II through XII intact.     Motor Exam "   Muscle bulk: normal  Overall muscle tone: normal    Strength   Strength 5/5 throughout.     Sensory Exam   Light touch normal.     Gait, Coordination, and Reflexes     Coordination   Finger to nose coordination: normal    Tremor   Resting tremor: absent        Results  MMSE=14      Assessment/Plan   Cassie was seen today for alzheimer's disease.    Diagnoses and all orders for this visit:    Late onset Alzheimer's disease without behavioral disturbance (CMS/HCC)    Other orders  -     sertraline (ZOLOFT) 50 MG tablet; Take 1 tablet by mouth Daily.          Discussion/Summary   Cassie Gomez returns to clinic today for evaluation of Alzheimer's Disease . I again reviewed her current status and treatment options. After discussing potential treatment options, it was elected to increase her sertraline to 25mg daily and continue on donepezil and memantine unchanged. She will then follow up in 6 months , or sooner if needed.   I spent 25 minutes face to face with the patient and family with 15 minutes spent on discussing diagnosis, evaluation, current status, treatment options and management as discussed above.       As part of this visit I obtained additional history from the family which is incorporated in the HPI.      Lavinia Randhawa PA-C

## 2019-11-21 ENCOUNTER — OFFICE VISIT (OUTPATIENT)
Dept: FAMILY MEDICINE CLINIC | Facility: CLINIC | Age: 64
End: 2019-11-21

## 2019-11-21 VITALS
DIASTOLIC BLOOD PRESSURE: 74 MMHG | HEIGHT: 68 IN | HEART RATE: 78 BPM | BODY MASS INDEX: 34.56 KG/M2 | OXYGEN SATURATION: 98 % | SYSTOLIC BLOOD PRESSURE: 124 MMHG | TEMPERATURE: 98.2 F | WEIGHT: 228 LBS

## 2019-11-21 DIAGNOSIS — E03.9 ACQUIRED HYPOTHYROIDISM: ICD-10-CM

## 2019-11-21 DIAGNOSIS — F32.A DEPRESSIVE DISORDER: ICD-10-CM

## 2019-11-21 DIAGNOSIS — K58.2 IRRITABLE BOWEL SYNDROME WITH BOTH CONSTIPATION AND DIARRHEA: ICD-10-CM

## 2019-11-21 DIAGNOSIS — I10 ESSENTIAL HYPERTENSION: ICD-10-CM

## 2019-11-21 DIAGNOSIS — F02.80 LATE ONSET ALZHEIMER'S DISEASE WITHOUT BEHAVIORAL DISTURBANCE (HCC): ICD-10-CM

## 2019-11-21 DIAGNOSIS — E11.9 TYPE 2 DIABETES MELLITUS WITHOUT COMPLICATION, WITHOUT LONG-TERM CURRENT USE OF INSULIN (HCC): Primary | ICD-10-CM

## 2019-11-21 DIAGNOSIS — E78.2 MIXED HYPERLIPIDEMIA: ICD-10-CM

## 2019-11-21 DIAGNOSIS — G30.1 LATE ONSET ALZHEIMER'S DISEASE WITHOUT BEHAVIORAL DISTURBANCE (HCC): ICD-10-CM

## 2019-11-21 LAB — HBA1C MFR BLD: 8.3 %

## 2019-11-21 PROCEDURE — 83036 HEMOGLOBIN GLYCOSYLATED A1C: CPT | Performed by: FAMILY MEDICINE

## 2019-11-21 PROCEDURE — 99214 OFFICE O/P EST MOD 30 MIN: CPT | Performed by: FAMILY MEDICINE

## 2019-11-21 RX ORDER — GLIPIZIDE 5 MG/1
5 TABLET ORAL
Qty: 180 TABLET | Refills: 1 | Status: SHIPPED | OUTPATIENT
Start: 2019-11-21 | End: 2020-02-10 | Stop reason: SDUPTHER

## 2019-11-21 NOTE — PROGRESS NOTES
Subjective   Cassie Gomez is a 64 y.o. female.     History of Present Illness  Mrs. Gomez presents today with her  for routine follow-up on several chronic medical problems.  Last visit 3 months ago.     She has HTN which has been stable on cozaar. Denies side effects. No CP, palpitations, orthopnea, swelling. Fairly heart healthy diet. No regular exercise.     She has HLP for which she is on statin. Tolerating well.     She has NIDDM which has been previously fairly well controlled. Fair job of following diabetic diet. She is not UTD on eye exam. It is scheduled for early next year. Tolerating metformin well. No longer checking BG as she does not remember how due ot dementia.     She has IBS with mixed constipation and diarrhea. Denies recent flares. Denies abd pain or blood in stool.     Has acquired hypoTH. Taking replacement as directed. Some fatigue. Otherwise stable. No new concerns     Has chronic depression as well as alzhemers dementia. Has had recent f/u with neurology. On zoloft, aricept, namenda. Taking as directed. Denies side effects. No recent falls or injuries. Dose of zoloft was increased to 50 mg as she was having worsening dysphoric mood, anhedonia.    Pt's previous HPI reviewed and updated as indicated.      The following portions of the patient's history were reviewed and updated as appropriate: allergies, current medications, past family history, past medical history, past social history, past surgical history and problem list.    Review of Systems   Constitutional: Positive for fatigue. Negative for appetite change, fever and unexpected weight change.   HENT: Negative for congestion, mouth sores, nosebleeds, rhinorrhea, sore throat and trouble swallowing.    Eyes: Negative for visual disturbance.   Respiratory: Negative for cough, shortness of breath and wheezing.    Cardiovascular: Negative for chest pain, palpitations and leg swelling.   Gastrointestinal: Negative for abdominal  pain, blood in stool, constipation, diarrhea, nausea and vomiting.   Endocrine: Negative for heat intolerance, polydipsia and polyuria.   Genitourinary: Negative for dysuria and hematuria.   Musculoskeletal: Negative for arthralgias and myalgias.   Skin: Negative for rash and wound.   Neurological: Negative for dizziness, tremors, syncope, weakness and headaches.   Hematological: Negative for adenopathy. Does not bruise/bleed easily.   Psychiatric/Behavioral: Positive for confusion, decreased concentration and dysphoric mood. Negative for sleep disturbance and suicidal ideas. The patient is not nervous/anxious.         Decreased memory   Pt's previous ROS reviewed and updated as indicated.       Objective    Vitals:    11/21/19 0947   BP: 124/74   Pulse: 78   Temp: 98.2 °F (36.8 °C)   SpO2: 98%     Body mass index is 34.67 kg/m².      11/21/19 0947   Weight: 103 kg (228 lb)       Physical Exam   Constitutional: She is oriented to person, place, and time. She appears well-developed and well-nourished. She is cooperative. She does not appear ill. No distress.   obese   HENT:   Head: Normocephalic and atraumatic.   Mouth/Throat: Mucous membranes are normal. Mucous membranes are not dry.   Eyes: Conjunctivae and lids are normal. Right eye exhibits no nystagmus. Left eye exhibits no nystagmus.   Neck: Phonation normal. Neck supple. Normal carotid pulses present. Carotid bruit is not present. Thyromegaly (mild on right) present. No thyroid mass present.   Cardiovascular: Normal rate, regular rhythm, S1 normal, S2 normal and intact distal pulses. Exam reveals no gallop.   No murmur heard.  Pulmonary/Chest: Effort normal and breath sounds normal.   Musculoskeletal: She exhibits no edema, tenderness or deformity.     Vascular Status -  Her right foot exhibits no edema. Her left foot exhibits no edema.  Lymphadenopathy:     She has no cervical adenopathy.   Neurological: She is alert and oriented to person, place, and time.  "She has normal strength. She displays no tremor. Gait normal.   Skin: Skin is warm and dry. No ecchymosis and no rash noted.       Psychiatric: Her speech is normal. Thought content normal. Her affect is blunt. She is slowed (mildly). Cognition and memory are impaired.   Good eye contact. Answers questions appropriately. Good personal hygiene and grooming. Gives limited answers, generally \"yes\", \"no\".   Nursing note and vitals reviewed.  Pt's previous physical exam reviewed and updated as indicated.      Lab Results   Component Value Date    HGBA1C 8.3 11/21/2019    HGBA1C 7.80 (H) 08/21/2019    HGBA1C 8.1 05/21/2019     Lab Results   Component Value Date    MICROALBUR 7.6 02/05/2018    CREATININE 0.85 08/21/2019     Lab Results   Component Value Date    WBC 9.78 08/21/2019    HGB 13.4 08/21/2019    HCT 46.1 08/21/2019    MCV 91.3 08/21/2019     08/21/2019       Lab Results   Component Value Date    BUN 8 08/21/2019    CREATININE 0.85 08/21/2019    EGFRIFNONA 67 08/21/2019    EGFRIFAFRI 81 08/21/2019    BCR 9.4 08/21/2019    K 4.3 08/21/2019    CO2 25.2 08/21/2019    CALCIUM 9.7 08/21/2019    PROTENTOTREF 7.3 08/21/2019    ALBUMIN 4.60 08/21/2019    LABIL2 1.7 08/21/2019    AST 21 08/21/2019    ALT 15 08/21/2019       Lab Results   Component Value Date    CHLPL 139 08/21/2019    TRIG 88 08/21/2019    HDL 62 (H) 08/21/2019    LDL 59 08/21/2019       Lab Results   Component Value Date    TSH 1.080 08/21/2019           Assessment/Plan   Cassie was seen today for hyperlipidemia, hypertension and diabetes.    Diagnoses and all orders for this visit:    Type 2 diabetes mellitus without complication, without long-term current use of insulin (CMS/[facility])  -     POC Glycosylated Hemoglobin (Hb A1C)    Essential hypertension    Mixed hyperlipidemia    Depressive disorder    Late onset Alzheimer's disease without behavioral disturbance (CMS/[facility])    Acquired hypothyroidism    Irritable bowel syndrome with both constipation " and diarrhea    Other orders  -     glipizide (GLUCOTROL) 5 MG tablet; Take 1 tablet by mouth 2 (Two) Times a Day Before Meals.       NIDDM uncontrolled. I have reviewed risks/benefits and potential side effects of various treatment options. Pt voices understanding and wishes to proceed with glipizide with meals. Patient encouraged to take meds as rx'd, follow diabetic appropriate diet, exercise daily, perform feet check daily, and have yearly diabetic eye exams. Encouraged  to check her BG if she is willing particularly if she has change in mental status, is ill, etc.    HTN controlled on ARB. HLP with LDL at goal. Continue statin. Pt advised to eat a heart healthy diet and get regular aerobic exercise.    Depression with recent increase in zoloft dose. No SI.    Euthyroid on current dose of levothyroxine.    IBSx stable. Continue dietary mgnt.    Routine f/u in 3 months, sooner as needed.  Patient/spouse encouraged to keep me informed of any acute changes, or any new concerning symptoms.  Pt/spouse is aware of reasons to seek emergent care.  Patient/spouse voiced understanding of all instructions and denied further questions.

## 2019-12-09 ENCOUNTER — TRANSCRIBE ORDERS (OUTPATIENT)
Dept: ADMINISTRATIVE | Facility: HOSPITAL | Age: 64
End: 2019-12-09

## 2019-12-09 DIAGNOSIS — Z12.31 ENCOUNTER FOR SCREENING MAMMOGRAM FOR MALIGNANT NEOPLASM OF BREAST: Primary | ICD-10-CM

## 2020-01-13 RX ORDER — LEVOTHYROXINE SODIUM 0.05 MG/1
50 TABLET ORAL DAILY
Qty: 90 TABLET | Refills: 0 | Status: SHIPPED | OUTPATIENT
Start: 2020-01-13 | End: 2020-03-24 | Stop reason: SDUPTHER

## 2020-01-31 ENCOUNTER — HOSPITAL ENCOUNTER (OUTPATIENT)
Dept: MAMMOGRAPHY | Facility: HOSPITAL | Age: 65
Discharge: HOME | End: 2020-01-31
Admitting: FAMILY MEDICINE

## 2020-01-31 DIAGNOSIS — Z12.31 ENCOUNTER FOR SCREENING MAMMOGRAM FOR MALIGNANT NEOPLASM OF BREAST: ICD-10-CM

## 2020-01-31 PROCEDURE — 77067 SCR MAMMO BI INCL CAD: CPT

## 2020-01-31 PROCEDURE — 77063 BREAST TOMOSYNTHESIS BI: CPT

## 2020-02-10 DIAGNOSIS — E11.9 TYPE 2 DIABETES MELLITUS WITHOUT COMPLICATION, WITHOUT LONG-TERM CURRENT USE OF INSULIN (HCC): ICD-10-CM

## 2020-02-10 RX ORDER — GLIPIZIDE 5 MG/1
5 TABLET ORAL
Qty: 180 TABLET | Refills: 1 | Status: SHIPPED | OUTPATIENT
Start: 2020-02-10 | End: 2020-08-31

## 2020-02-10 RX ORDER — LOSARTAN POTASSIUM 100 MG/1
100 TABLET ORAL DAILY
Qty: 90 TABLET | Refills: 1 | Status: SHIPPED | OUTPATIENT
Start: 2020-02-10 | End: 2020-05-18 | Stop reason: SDUPTHER

## 2020-02-10 RX ORDER — LOVASTATIN 40 MG/1
40 TABLET ORAL NIGHTLY
Qty: 90 TABLET | Refills: 1 | Status: SHIPPED | OUTPATIENT
Start: 2020-02-10 | End: 2020-05-18 | Stop reason: SDUPTHER

## 2020-02-24 ENCOUNTER — OFFICE VISIT (OUTPATIENT)
Dept: FAMILY MEDICINE CLINIC | Facility: CLINIC | Age: 65
End: 2020-02-24

## 2020-02-24 VITALS
WEIGHT: 235.25 LBS | DIASTOLIC BLOOD PRESSURE: 76 MMHG | TEMPERATURE: 97.6 F | OXYGEN SATURATION: 99 % | BODY MASS INDEX: 35.65 KG/M2 | HEART RATE: 78 BPM | SYSTOLIC BLOOD PRESSURE: 128 MMHG | HEIGHT: 68 IN

## 2020-02-24 DIAGNOSIS — F02.80 LATE ONSET ALZHEIMER'S DISEASE WITHOUT BEHAVIORAL DISTURBANCE (HCC): ICD-10-CM

## 2020-02-24 DIAGNOSIS — E78.2 MIXED HYPERLIPIDEMIA: ICD-10-CM

## 2020-02-24 DIAGNOSIS — G30.1 LATE ONSET ALZHEIMER'S DISEASE WITHOUT BEHAVIORAL DISTURBANCE (HCC): ICD-10-CM

## 2020-02-24 DIAGNOSIS — E11.9 TYPE 2 DIABETES MELLITUS WITHOUT COMPLICATION, WITHOUT LONG-TERM CURRENT USE OF INSULIN (HCC): Primary | ICD-10-CM

## 2020-02-24 DIAGNOSIS — E03.9 ACQUIRED HYPOTHYROIDISM: ICD-10-CM

## 2020-02-24 DIAGNOSIS — I10 ESSENTIAL HYPERTENSION: ICD-10-CM

## 2020-02-24 DIAGNOSIS — Z51.81 MEDICATION MONITORING ENCOUNTER: ICD-10-CM

## 2020-02-24 LAB — HBA1C MFR BLD: 6.9 %

## 2020-02-24 PROCEDURE — 83036 HEMOGLOBIN GLYCOSYLATED A1C: CPT | Performed by: FAMILY MEDICINE

## 2020-02-24 PROCEDURE — 99214 OFFICE O/P EST MOD 30 MIN: CPT | Performed by: FAMILY MEDICINE

## 2020-02-24 NOTE — PROGRESS NOTES
Subjective   Cassie Gomez is a 65 y.o. female.     History of Present Illness  Mrs. Gomez presents today with her  for routine follow-up on several chronic medical problems.  Last visit 3 months ago.     She has HTN which has been stable on cozaar. Denies side effects. No CP, palpitations, orthopnea, swelling. Fairly heart healthy diet. No regular exercise.     She has HLP for which she is on statin. Tolerating well.     She has NIDDM which was uncontrolled at visit 3 months ago, due for recheck A1c. Fair job of following diabetic diet. She is not UTD on eye exam. Tolerating metformin and glipizide added at last visit. No longer checking BG as she does not remember how due ot dementia;  checks her BG intermittently.    Has acquired hypoTH. Taking replacement as directed. Some fatigue and weight gain over winter. Otherwise stable. No new concerns     Has chronic depression as well as alzhemers dementia. Followed by neurology. On zoloft, aricept, namenda. Taking as directed. Denies side effects. No recent falls or injuries. depression improved with increased dose of zoloft.    Pt's previous HPI reviewed and updated as indicated.     The following portions of the patient's history were reviewed and updated as appropriate: allergies, current medications, past family history, past medical history, past social history, past surgical history and problem list.    Review of Systems   Constitutional: Positive for fatigue. Negative for appetite change, fever and unexpected weight change.   HENT: Negative for congestion, mouth sores, nosebleeds, rhinorrhea, sore throat and trouble swallowing.    Eyes: Negative for visual disturbance.   Respiratory: Negative for cough, shortness of breath and wheezing.    Cardiovascular: Negative for chest pain, palpitations and leg swelling.   Gastrointestinal: Negative for abdominal pain, blood in stool, constipation, diarrhea, nausea and vomiting.   Endocrine: Negative for  heat intolerance, polydipsia and polyuria.   Genitourinary: Negative for dysuria and hematuria.   Musculoskeletal: Negative for arthralgias and myalgias.   Skin: Negative for rash and wound.   Neurological: Negative for dizziness, tremors, syncope, weakness and headaches.   Hematological: Negative for adenopathy. Does not bruise/bleed easily.   Psychiatric/Behavioral: Positive for confusion, decreased concentration and dysphoric mood. Negative for sleep disturbance and suicidal ideas. The patient is not nervous/anxious.         Decreased memory   Pt's previous ROS reviewed and updated as indicated.       Objective    Vitals:    02/24/20 0910   BP: 128/76   Pulse: 78   Temp: 97.6 °F (36.4 °C)   SpO2: 99%     Body mass index is 35.77 kg/m².      02/24/20 0910   Weight: 107 kg (235 lb 4 oz)       Physical Exam   Constitutional: She is oriented to person, place, and time. She appears well-developed and well-nourished. She is cooperative. She does not appear ill. No distress.   obese   HENT:   Head: Normocephalic and atraumatic.   Mouth/Throat: Mucous membranes are normal. Mucous membranes are not dry.   Eyes: Conjunctivae and lids are normal. No scleral icterus. Right eye exhibits no nystagmus. Left eye exhibits no nystagmus.   Neck: Phonation normal. Neck supple. Normal carotid pulses present. Carotid bruit is not present. Thyromegaly (mild on right) present. No thyroid mass present.   Cardiovascular: Normal rate, regular rhythm, S1 normal, S2 normal and intact distal pulses. Exam reveals no gallop.   No murmur heard.  Pulmonary/Chest: Effort normal and breath sounds normal.   Musculoskeletal: She exhibits no edema, tenderness or deformity.     Vascular Status -  Her right foot exhibits no edema. Her left foot exhibits no edema.  Lymphadenopathy:     She has no cervical adenopathy.   Neurological: She is alert and oriented to person, place, and time. She has normal strength. She displays no tremor. Gait normal.  "  Skin: Skin is warm and dry. No ecchymosis and no rash noted.       Psychiatric: Her speech is normal. Thought content normal. Her affect is blunt. She is slowed (mildly). Cognition and memory are impaired.   Good eye contact. Answers questions appropriately. Good personal hygiene and grooming. Gives limited answers, generally \"yes\", \"no\".   Nursing note and vitals reviewed.  Pt's previous physical exam reviewed and updated as indicated.    Lab Results   Component Value Date    HGBA1C 6.9 02/24/2020    HGBA1C 8.3 11/21/2019    HGBA1C 7.80 (H) 08/21/2019     Lab Results   Component Value Date    MICROALBUR 7.6 02/05/2018    CREATININE 0.83 02/24/2020         Assessment/Plan   Cassie was seen today for hyperlipidemia, hypertension, diabetes and hypothyroidism.    Diagnoses and all orders for this visit:    Type 2 diabetes mellitus without complication, without long-term current use of insulin (CMS/[facility])  -     POC Glycosylated Hemoglobin (Hb A1C)  -     Comprehensive Metabolic Panel  -     pneumococcal polysaccharide 23-valent (PNEUMOVAX-23) vaccine 0.5 mL  -     Ambulatory Referral for Diabetic Eye Exam-Optometry    Essential hypertension  -     Comprehensive Metabolic Panel  -     CBC (No Diff)    Mixed hyperlipidemia  -     Lipid Panel  -     Comprehensive Metabolic Panel    Acquired hypothyroidism  -     TSH Rfx On Abnormal To Free T4    Late onset Alzheimer's disease without behavioral disturbance (CMS/[facility])    Medication monitoring encounter  -     Comprehensive Metabolic Panel  -     CBC (No Diff)        Advance Care Planning   ACP discussion was held with the patient during this visit. Patient does not have an advance directive, information provided.    NIDDM with A1c now at goal. Continue glipizide and metformin. Patient encouraged to take meds as rx'd, follow diabetic appropriate diet, exercise daily, perform feet check daily, and have yearly diabetic eye exams.    HTN controlled. Cont ARB. HLP with good " tolerance of statin. Pt advised to eat a heart healthy diet and get regular aerobic exercise.    Reassess thyroid function and adjust as indicated.    F/u with neurology for mgnt of dementia. Appears clinically stable at this time.    Routine f/u in 3 months, sooner as needed/instructed.  I will contact patient regarding test results and provide instructions regarding any necessary changes in plan of care.  Patient/ encouraged to keep me informed of any acute changes, lack of improvement, or any new concerning symptoms.  Pt/ aware of reasons to seek emergent care.  Patient/ voiced understanding of all instructions and denied further questions.

## 2020-02-24 NOTE — PATIENT INSTRUCTIONS
Advance Care Planning and Advance Directives     You make decisions on a daily basis - decisions about where you want to live, your career, your home, your life. Perhaps one of the most important decisions you face is your choice for future medical care. Take time to talk with your family and your healthcare team and start planning today.  Advance Care Planning is a process that can help you:  · Understand possible future healthcare decisions in light of your own experiences  · Reflect on those decision in light of your goals and values  · Discuss your decisions with those closest to you and the healthcare professionals that care for you  · Make a plan by creating a document that reflects your wishes    Surrogate Decision Maker  In the event of a medical emergency, which has left you unable to communicate or to make your own decisions, you would need someone to make decisions for you.  It is important to discuss your preferences for medical treatment with this person while you are in good health.     Qualities of a surrogate decision maker:  • Willing to take on this role and responsibility  • Knows what you want for future medical care  • Willing to follow your wishes even if they don't agree with them  • Able to make difficult medical decisions under stressful circumstances    Advance Directives  These are legal documents you can create that will guide your healthcare team and decision maker(s) when needed. These documents can be stored in the electronic medical record.    · Living Will - a legal document to guide your care if you have a terminal condition or a serious illness and are unable to communicate. States vary by statute in document names/types, but most forms may include one or more of the following:        -  Directions regarding life-prolonging treatments        -  Directions regarding artificially provided nutrition/hydration        -  Choosing a healthcare decision maker        -  Direction  regarding organ/tissue donation    · Durable Power of  for Healthcare - this document names an -in-fact to make medical decisions for you, but it may also allow this person to make personal and financial decisions for you. Please seek the advice of an  if you need this type of document.    **Advance Directives are not required and no one may discriminate against you if you do not sign one.    Medical Orders  Many states allow specific forms/orders signed by your physician to record your wishes for medical treatment in your current state of health. This form, signed in personal communication with your physician, addresses resuscitation and other medical interventions that you may or may not want.      For more information or to schedule a time with a [facility] Advance Care Planning Facilitator contact: [email or web address] or call [phone] and someone will contact you directly.

## 2020-02-25 LAB
ALBUMIN SERPL-MCNC: 4.1 G/DL (ref 3.5–5.2)
ALBUMIN/GLOB SERPL: 1.5 G/DL
ALP SERPL-CCNC: 99 U/L (ref 39–117)
ALT SERPL-CCNC: 12 U/L (ref 1–33)
AST SERPL-CCNC: 21 U/L (ref 1–32)
BILIRUB SERPL-MCNC: 0.2 MG/DL (ref 0.2–1.2)
BUN SERPL-MCNC: 10 MG/DL (ref 8–23)
BUN/CREAT SERPL: 12 (ref 7–25)
CALCIUM SERPL-MCNC: 9.3 MG/DL (ref 8.6–10.5)
CHLORIDE SERPL-SCNC: 100 MMOL/L (ref 98–107)
CHOLEST SERPL-MCNC: 153 MG/DL (ref 0–200)
CO2 SERPL-SCNC: 25.9 MMOL/L (ref 22–29)
CREAT SERPL-MCNC: 0.83 MG/DL (ref 0.57–1)
ERYTHROCYTE [DISTWIDTH] IN BLOOD BY AUTOMATED COUNT: 14 % (ref 12.3–15.4)
GLOBULIN SER CALC-MCNC: 2.7 GM/DL
GLUCOSE SERPL-MCNC: 231 MG/DL (ref 65–99)
HCT VFR BLD AUTO: 39.4 % (ref 34–46.6)
HDLC SERPL-MCNC: 68 MG/DL (ref 40–60)
HGB BLD-MCNC: 12.6 G/DL (ref 12–15.9)
LDLC SERPL CALC-MCNC: 70 MG/DL (ref 0–100)
MCH RBC QN AUTO: 26.8 PG (ref 26.6–33)
MCHC RBC AUTO-ENTMCNC: 32 G/DL (ref 31.5–35.7)
MCV RBC AUTO: 83.8 FL (ref 79–97)
PLATELET # BLD AUTO: 368 10*3/MM3 (ref 140–450)
POTASSIUM SERPL-SCNC: 4.3 MMOL/L (ref 3.5–5.2)
PROT SERPL-MCNC: 6.8 G/DL (ref 6–8.5)
RBC # BLD AUTO: 4.7 10*6/MM3 (ref 3.77–5.28)
SODIUM SERPL-SCNC: 139 MMOL/L (ref 136–145)
TRIGL SERPL-MCNC: 77 MG/DL (ref 0–150)
TSH SERPL DL<=0.005 MIU/L-ACNC: 1.64 UIU/ML (ref 0.27–4.2)
VLDLC SERPL CALC-MCNC: 15.4 MG/DL
WBC # BLD AUTO: 8.99 10*3/MM3 (ref 3.4–10.8)

## 2020-03-24 RX ORDER — DONEPEZIL HYDROCHLORIDE 10 MG/1
10 TABLET, FILM COATED ORAL 2 TIMES DAILY
Qty: 180 TABLET | Refills: 3 | Status: SHIPPED | OUTPATIENT
Start: 2020-03-24 | End: 2020-06-08

## 2020-03-24 RX ORDER — LEVOTHYROXINE SODIUM 0.05 MG/1
50 TABLET ORAL DAILY
Qty: 90 TABLET | Refills: 0 | Status: SHIPPED | OUTPATIENT
Start: 2020-03-24 | End: 2020-07-09 | Stop reason: SDUPTHER

## 2020-03-24 RX ORDER — MEMANTINE HYDROCHLORIDE 10 MG/1
10 TABLET ORAL 2 TIMES DAILY
Qty: 180 TABLET | Refills: 3 | Status: SHIPPED | OUTPATIENT
Start: 2020-03-24 | End: 2021-12-03

## 2020-05-01 ENCOUNTER — APPOINTMENT (OUTPATIENT)
Dept: CT IMAGING | Facility: HOSPITAL | Age: 65
End: 2020-05-01

## 2020-05-01 ENCOUNTER — TELEMEDICINE (OUTPATIENT)
Dept: FAMILY MEDICINE CLINIC | Facility: TELEHEALTH | Age: 65
End: 2020-05-01

## 2020-05-01 ENCOUNTER — HOSPITAL ENCOUNTER (EMERGENCY)
Facility: HOSPITAL | Age: 65
Discharge: HOME | End: 2020-05-01
Attending: STUDENT IN AN ORGANIZED HEALTH CARE EDUCATION/TRAINING PROGRAM | Admitting: STUDENT IN AN ORGANIZED HEALTH CARE EDUCATION/TRAINING PROGRAM

## 2020-05-01 ENCOUNTER — APPOINTMENT (OUTPATIENT)
Dept: GENERAL RADIOLOGY | Facility: HOSPITAL | Age: 65
End: 2020-05-01

## 2020-05-01 VITALS
OXYGEN SATURATION: 99 % | RESPIRATION RATE: 18 BRPM | HEIGHT: 67 IN | DIASTOLIC BLOOD PRESSURE: 69 MMHG | TEMPERATURE: 97.4 F | SYSTOLIC BLOOD PRESSURE: 143 MMHG | HEART RATE: 66 BPM | WEIGHT: 225 LBS | BODY MASS INDEX: 35.31 KG/M2

## 2020-05-01 DIAGNOSIS — R41.89 COGNITIVE IMPAIRMENT: ICD-10-CM

## 2020-05-01 DIAGNOSIS — R55 NEAR SYNCOPE: Primary | ICD-10-CM

## 2020-05-01 DIAGNOSIS — R42 DIZZINESS: ICD-10-CM

## 2020-05-01 DIAGNOSIS — R42 EPISODE OF DIZZINESS: Primary | ICD-10-CM

## 2020-05-01 LAB
ALBUMIN SERPL-MCNC: 4.7 G/DL (ref 3.5–5.2)
ALBUMIN/GLOB SERPL: 1.4 G/DL
ALP SERPL-CCNC: 110 U/L (ref 39–117)
ALT SERPL W P-5'-P-CCNC: 20 U/L (ref 1–33)
ANION GAP SERPL CALCULATED.3IONS-SCNC: 13.6 MMOL/L (ref 5–15)
AST SERPL-CCNC: 35 U/L (ref 1–32)
BASOPHILS # BLD AUTO: 0.09 10*3/MM3 (ref 0–0.2)
BASOPHILS NFR BLD AUTO: 0.7 % (ref 0–1.5)
BILIRUB SERPL-MCNC: 0.4 MG/DL (ref 0.2–1.2)
BILIRUB UR QL STRIP: NEGATIVE
BUN BLD-MCNC: 11 MG/DL (ref 8–23)
BUN/CREAT SERPL: 12 (ref 7–25)
CALCIUM SPEC-SCNC: 10.1 MG/DL (ref 8.6–10.5)
CHLORIDE SERPL-SCNC: 99 MMOL/L (ref 98–107)
CLARITY UR: ABNORMAL
CO2 SERPL-SCNC: 26.4 MMOL/L (ref 22–29)
COLOR UR: YELLOW
CREAT BLD-MCNC: 0.92 MG/DL (ref 0.57–1)
DEPRECATED RDW RBC AUTO: 48.7 FL (ref 37–54)
EOSINOPHIL # BLD AUTO: 0.1 10*3/MM3 (ref 0–0.4)
EOSINOPHIL NFR BLD AUTO: 0.8 % (ref 0.3–6.2)
ERYTHROCYTE [DISTWIDTH] IN BLOOD BY AUTOMATED COUNT: 15.5 % (ref 12.3–15.4)
GFR SERPL CREATININE-BSD FRML MDRD: 61 ML/MIN/1.73
GLOBULIN UR ELPH-MCNC: 3.4 GM/DL
GLUCOSE BLD-MCNC: 196 MG/DL (ref 65–99)
GLUCOSE UR STRIP-MCNC: NEGATIVE MG/DL
HCT VFR BLD AUTO: 44.3 % (ref 34–46.6)
HGB BLD-MCNC: 13.8 G/DL (ref 12–15.9)
HGB UR QL STRIP.AUTO: NEGATIVE
HOLD SPECIMEN: NORMAL
HOLD SPECIMEN: NORMAL
IMM GRANULOCYTES # BLD AUTO: 0.05 10*3/MM3 (ref 0–0.05)
IMM GRANULOCYTES NFR BLD AUTO: 0.4 % (ref 0–0.5)
KETONES UR QL STRIP: ABNORMAL
LEUKOCYTE ESTERASE UR QL STRIP.AUTO: NEGATIVE
LYMPHOCYTES # BLD AUTO: 1.95 10*3/MM3 (ref 0.7–3.1)
LYMPHOCYTES NFR BLD AUTO: 16.1 % (ref 19.6–45.3)
MAGNESIUM SERPL-MCNC: 2.2 MG/DL (ref 1.6–2.4)
MCH RBC QN AUTO: 26.9 PG (ref 26.6–33)
MCHC RBC AUTO-ENTMCNC: 31.2 G/DL (ref 31.5–35.7)
MCV RBC AUTO: 86.4 FL (ref 79–97)
MONOCYTES # BLD AUTO: 0.82 10*3/MM3 (ref 0.1–0.9)
MONOCYTES NFR BLD AUTO: 6.8 % (ref 5–12)
NEUTROPHILS # BLD AUTO: 9.1 10*3/MM3 (ref 1.7–7)
NEUTROPHILS NFR BLD AUTO: 75.2 % (ref 42.7–76)
NITRITE UR QL STRIP: NEGATIVE
NRBC BLD AUTO-RTO: 0 /100 WBC (ref 0–0.2)
PH UR STRIP.AUTO: 5.5 [PH] (ref 5–8)
PLATELET # BLD AUTO: 374 10*3/MM3 (ref 140–450)
PMV BLD AUTO: 9.6 FL (ref 6–12)
POTASSIUM BLD-SCNC: 4.3 MMOL/L (ref 3.5–5.2)
PROT SERPL-MCNC: 8.1 G/DL (ref 6–8.5)
PROT UR QL STRIP: NEGATIVE
RBC # BLD AUTO: 5.13 10*6/MM3 (ref 3.77–5.28)
SODIUM BLD-SCNC: 139 MMOL/L (ref 136–145)
SP GR UR STRIP: 1.02 (ref 1–1.03)
TROPONIN T SERPL-MCNC: <0.01 NG/ML (ref 0–0.03)
UROBILINOGEN UR QL STRIP: ABNORMAL
WBC NRBC COR # BLD: 12.11 10*3/MM3 (ref 3.4–10.8)
WHOLE BLOOD HOLD SPECIMEN: NORMAL
WHOLE BLOOD HOLD SPECIMEN: NORMAL

## 2020-05-01 PROCEDURE — 99284 EMERGENCY DEPT VISIT MOD MDM: CPT

## 2020-05-01 PROCEDURE — 83735 ASSAY OF MAGNESIUM: CPT | Performed by: STUDENT IN AN ORGANIZED HEALTH CARE EDUCATION/TRAINING PROGRAM

## 2020-05-01 PROCEDURE — 81003 URINALYSIS AUTO W/O SCOPE: CPT | Performed by: PHYSICIAN ASSISTANT

## 2020-05-01 PROCEDURE — 85025 COMPLETE CBC W/AUTO DIFF WBC: CPT | Performed by: STUDENT IN AN ORGANIZED HEALTH CARE EDUCATION/TRAINING PROGRAM

## 2020-05-01 PROCEDURE — 93005 ELECTROCARDIOGRAM TRACING: CPT | Performed by: STUDENT IN AN ORGANIZED HEALTH CARE EDUCATION/TRAINING PROGRAM

## 2020-05-01 PROCEDURE — 71045 X-RAY EXAM CHEST 1 VIEW: CPT

## 2020-05-01 PROCEDURE — 84484 ASSAY OF TROPONIN QUANT: CPT | Performed by: STUDENT IN AN ORGANIZED HEALTH CARE EDUCATION/TRAINING PROGRAM

## 2020-05-01 PROCEDURE — 80053 COMPREHEN METABOLIC PANEL: CPT | Performed by: STUDENT IN AN ORGANIZED HEALTH CARE EDUCATION/TRAINING PROGRAM

## 2020-05-01 PROCEDURE — 70450 CT HEAD/BRAIN W/O DYE: CPT

## 2020-05-01 RX ORDER — SODIUM CHLORIDE 0.9 % (FLUSH) 0.9 %
10 SYRINGE (ML) INJECTION AS NEEDED
Status: DISCONTINUED | OUTPATIENT
Start: 2020-05-01 | End: 2020-05-01 | Stop reason: HOSPADM

## 2020-05-01 RX ADMIN — SODIUM CHLORIDE 1000 ML: 9 INJECTION, SOLUTION INTRAVENOUS at 13:20

## 2020-05-01 NOTE — PROGRESS NOTES
CHIEF COMPLAINT  Chief Complaint   Patient presents with   • Dizziness   • Syncope     History obtained from spouse and patient, patient with dementia      HPI  Cassie Gomez is a 65 y.o. female  presents with complaint of dizziness and near syncope. Dizziness started 4 days ago and spouse reports patient is unstable with gait.  Patient states she feels like she is off balance.  Decreased appetite and po intake the last few days.  Was at chiropractor office this am and almost passed out, patient reports feeling hot and spouse says she was diaphoretic.  Denies headache, fever, speech difficulties, weakness, uri symptoms, cp, soa, abd pain, n/v/d or urinary symptoms.      Review of Systems   Constitutional: Positive for diaphoresis. Negative for chills and fever.   HENT: Negative for congestion and rhinorrhea.    Respiratory: Negative for shortness of breath and wheezing.    Cardiovascular: Negative for chest pain.   Gastrointestinal: Negative for abdominal pain, diarrhea and vomiting.   Genitourinary: Negative for difficulty urinating.   Neurological: Positive for dizziness and syncope. Negative for facial asymmetry, speech difficulty, weakness and headaches.       Past Medical History:   Diagnosis Date   • Colon polyp    • Depression    • Diabetes mellitus (CMS/HCC) 2000   • H/O bone density study 2011    normal   • Thyroid disease    • Thyroid enlargement        Family History   Problem Relation Age of Onset   • Diabetes Mother    • Heart disease Mother    • Heart attack Mother    • COPD Mother    • Dementia Father    • Diabetes Father    • Stroke Father    • Squamous cell carcinoma Brother         oral       Social History     Socioeconomic History   • Marital status:      Spouse name: Not on file   • Number of children: Not on file   • Years of education: Not on file   • Highest education level: Not on file   Tobacco Use   • Smoking status: Never Smoker   • Smokeless tobacco: Never Used    Substance and Sexual Activity   • Alcohol use: No   • Drug use: No   • Sexual activity: Yes     Partners: Male     Birth control/protection: Post-menopausal         There were no vitals taken for this visit.    PHYSICAL EXAM  Physical Exam   Constitutional: She appears well-developed and well-nourished.   HENT:   Head: Normocephalic and atraumatic.   Eyes: Pupils are equal, round, and reactive to light.   Neck: Neck normal appearance.  Pulmonary/Chest: Effort normal.   Neurological: She is alert.   Psychiatric: She has a normal mood and affect.          Cassie was seen today for dizziness and syncope.    Diagnoses and all orders for this visit:    Near syncope    Dizziness          FOLLOW-UP  *Go to the Emergency Department now    Patient verbalizes understanding and reports will go to [facility] now for evaluation.    VALENTINA Garcia  05/01/2020  10:35 AM    This visit was performed via Telehealth.

## 2020-05-01 NOTE — DISCHARGE INSTRUCTIONS
Make sure you are drinking plenty of water to stay hydrated.  Take all home medications as directed.  You will need to follow-up with your primary care provider in the next few days; if dizziness persists, they may want to do further workup with holter monitor.  Follow-up with your neurologist Dr. Jean-Baptiste as early as possible to reevaluate symptoms.  Return to ER for any change, worsening symptoms, or any additional concerns include not limited to severe worsening dizziness, inability to ambulate, severe headache, extremity weakness, difficulty speaking or swallowing, chest pain, shortness of breath.

## 2020-05-01 NOTE — ED PROVIDER NOTES
Subjective   Patient is a 65-year-old man with a history of Alzheimer disease, depression, diabetes, and thyroid disease presenting to the ER for evaluation of dizziness and unsteady gait.  Patient's  is bedside and helps with HPI.  They state that for the past 4 days she has had dizziness, unsteady gait, nausea and poor appetite.  They states she was at a chiropractor appointment today where she broke out in a sweat and nearly had a syncopal episode, did not actually lose consciousness.  They had a telemedicine visit with her primary care provider who advised to come here to the ER.  Patient denies any complaints or concerns at this time.  They deny any headache, vision loss or changes, current dizziness, chest pain, shortness of breath, abdominal pain, emesis, diarrhea, urinary symptoms, weakness of any extremities, or any other symptoms.          Review of Systems   Constitutional: Negative for chills and fever.   HENT: Negative.    Eyes: Negative.    Respiratory: Negative for cough and shortness of breath.    Gastrointestinal: Positive for nausea. Negative for abdominal pain, diarrhea and vomiting.   Genitourinary: Negative.    Musculoskeletal: Negative.    Skin: Negative.    Allergic/Immunologic: Negative for immunocompromised state.   Neurological: Positive for dizziness. Negative for syncope, speech difficulty and headaches.   Psychiatric/Behavioral: Negative.        Past Medical History:   Diagnosis Date   • Alzheimer disease (CMS/[facility])    • Colon polyp    • Depression    • Diabetes mellitus (CMS/HCC)    • H/O bone density study     normal   • Thyroid disease    • Thyroid enlargement        Allergies   Allergen Reactions   • Bee Venom Anaphylaxis   • Ace Inhibitors Cough   • Venlafaxine Other (See Comments)     insomnia       Past Surgical History:   Procedure Laterality Date   • ADENOIDECTOMY     •  SECTION  1978   • TONSILLECTOMY         Family History   Problem Relation Age of  "Onset   • Diabetes Mother    • Heart disease Mother    • Heart attack Mother    • COPD Mother    • Dementia Father    • Diabetes Father    • Stroke Father    • Squamous cell carcinoma Brother         oral       Social History     Socioeconomic History   • Marital status:      Spouse name: Not on file   • Number of children: Not on file   • Years of education: Not on file   • Highest education level: Not on file   Tobacco Use   • Smoking status: Never Smoker   • Smokeless tobacco: Never Used   Substance and Sexual Activity   • Alcohol use: No   • Drug use: No   • Sexual activity: Yes     Partners: Male     Birth control/protection: Post-menopausal           Objective   Physical Exam   Nursing note and vitals reviewed.  /69   Pulse 66   Temp 97.4 °F (36.3 °C) (Oral)   Resp 18   Ht 170.2 cm (67\")   Wt 102 kg (225 lb)   SpO2 99%   BMI 35.24 kg/m²     GEN: No acute distress, sitting upright in stretcher.  Awake and alert.  Does not appear toxic.  Head: Normocephalic, atraumatic  Eyes: PERRL, EOM intact, mild horizontal nystagmus  ENT: Posterior pharynx normal in appearance, oral mucosa is moist, tongue midline.  Cardiovascular: Regular rate  Lungs: Clear to auscultation bilaterally  Abdomen: Soft, nontender, nondistended, no peritoneal signs or guarding  Extremities: No edema, normal appearance, full ROM. Strength 5/5 and equal.   Neuro: GCS 15.  Patient is able to follow commands.No ataxia or dysmetria with the right upper extremity.  There is no pronator drift.  When asked for the patient to touch her nose with her left hand, she uses her right hand, does not perform the tasks with her left but has full ROM. Gait without abnormalities.  Psych: Mood and affect are appropriate    Procedures           ED Course  ED Course as of May 01 1656   Fri May 01, 2020   1211 WBC(!): 12.11 [LA]   1211 Hemoglobin: 13.8 [LA]   1211 Platelets: 374 [LA]   1211 Neutrophil Rel %: 75.2 [LA]   1211 Lymphocyte Rel %(!): " 16.1 [LA]   1211 Monocyte Rel %: 6.8 [LA]   1211 Eosinophil Rel %: 0.8 [LA]   1211 Basophil Rel %: 0.7 [LA]   1211 Immature Granulocyte Rel %: 0.4 [LA]   1211 Troponin T: <0.010 [LA]   1211 Magnesium: 2.2 [LA]   1216 PROCEDURE: CT HEAD WO CONTRAST STROKE PROTOCOL-     HISTORY: Dizziness, unsteady gait     COMPARISON:  None .     TECHNIQUE: Multiple axial CT images were performed from the foramen  magnum to the vertex without enhancement.      FINDINGS: There is generalized cerebral volume loss. Patchy  hypodensities in the periventricular white matter consistent with  chronic small vessel ischemic change. There is no CT evidence of  hemorrhage. There is no mass, mass effect or midline shift.  There is no  hydrocephalus. The paranasal sinuses are clear. Bone windows reveal no  acute osseous abnormalities.     IMPRESSION:  No acute intracranial process.           937.63 mGy.cm        This study was performed with techniques to keep radiation doses as low  as reasonably achievable (ALARA). Individualized dose reduction  techniques using automated exposure control or adjustment of mA and/or  kV according to the patient size were employed.      This report was finalized on 5/1/2020 12:10 PM by Livier Montemayor M.D..    [LA]   1222 PROCEDURE: XR CHEST 1 VW-     HISTORY: Weak/Dizzy/AMS triage protocol     COMPARISON: None.     FINDINGS: The heart is normal in size. The mediastinum is unremarkable.  The lungs are clear. There is no pneumothorax.  There are no acute  osseous abnormalities.     IMPRESSION:  No acute cardiopulmonary process.     Continued followup is recommended.     This report was finalized on 5/1/2020 12:18 PM by Livier Montemayor M.D..    [LA]   1301 Glucose(!): 196 [LA]   1302 BUN: 11 [LA]   1302 Creatinine: 0.92 [LA]   1302 Sodium: 139 [LA]   1302 Potassium: 4.3 [LA]   1302 Chloride: 99 [LA]   1302 CO2: 26.4 [LA]   1302 Calcium: 10.1 [LA]   1302 Total Protein: 8.1 [LA]   1302 Albumin: 4.70 [LA]    1302 ALT (SGPT): 20 [LA]   1302 AST (SGOT)(!): 35 [LA]   1302 Alkaline Phosphatase: 110 [LA]   1302 Total Bilirubin: 0.4 [LA]   1302 eGFR Non  Am: 61 [LA]   1302 Globulin: 3.4 [LA]   1302 A/G Ratio: 1.4 [LA]   1302 BUN/Creatinine Ratio: 12.0 [LA]   1302 Anion Gap: 13.6 [LA]   1327 Tech informed me that MRI machine is down.    [LA]   1346 EKG shows a sinus rhythm with a rate of 76.  AK interval is 112.  Nonspecific T waves throughout.  Abnormal EKG.  Interpreted by me.    [DT]   1412 Dr. Pittman assessed patient at bedside, no signs concerning for cerebellar stroke. Patient has ambulated without difficulty, no ataxia or dysmetria.  Believe that when she was trying to follow commands she was more confused because she can perform rapid alternating movements bilaterally without difficulty    [LA]   1434 Discussed all results with patient and her .  She is resting controlling the stretcher.  Discussed follow-up with her primary care provider and neurologist.  Discuss strict return precautions.  She verbalized understanding and was in agreement with this plan of care    [LA]      ED Course User Index  [DT] Erick Pittman MD  [LA] Trinity Jordan PA-C                                           MDM  Number of Diagnoses or Management Options  Cognitive impairment:   Episode of dizziness:   Diagnosis management comments: On arrival, patient is stable, no acute distress, nontoxic appearance.  Differential includes dehydration, cardiac arrhythmia, TIA, CVA, electrolyte abnormalities, and other concerns.  Obtain basic labs, urinalysis, troponin, EKG, chest x-ray, head CT.    Head CT as read by the radiologist revealed no acute abnormalities.  Chest x-ray is read by the radiologist was unremarkable.  And a very mild leukocytosis of 12.11 with no other significant electrolyte amounts of than mildly elevated glucose of 196.  Magnesium is within normal limits.  Troponin was not elevated.  EKG was interpreted by  attending.  Her urine had no signs of infection but did show ketones.  She has been asymptomatic here.  Dr. Pittman evaluated the patient at bedside with me as well and there is low concern for any kind of cerebellar stroke.  We will have her follow-up with her primary care provider and her neurologist.  We discussed very strict return precautions.  Patient and her  verbalized understanding and were agreement with this plan of care       Amount and/or Complexity of Data Reviewed  Clinical lab tests: reviewed and ordered  Tests in the radiology section of CPT®: reviewed and ordered  Discussion of test results with the performing providers: yes  Obtain history from someone other than the patient: yes  Review and summarize past medical records: yes  Discuss the patient with other providers: yes    Risk of Complications, Morbidity, and/or Mortality  Presenting problems: moderate  Diagnostic procedures: moderate  Management options: low    Patient Progress  Patient progress: stable      Final diagnoses:   Episode of dizziness   Cognitive impairment            Trinity Jordan PA-C  05/01/20 0455

## 2020-05-18 RX ORDER — LOSARTAN POTASSIUM 100 MG/1
100 TABLET ORAL DAILY
Qty: 90 TABLET | Refills: 1 | Status: SHIPPED | OUTPATIENT
Start: 2020-05-18 | End: 2020-06-01 | Stop reason: SDUPTHER

## 2020-05-18 RX ORDER — LOVASTATIN 40 MG/1
40 TABLET ORAL NIGHTLY
Qty: 90 TABLET | Refills: 1 | Status: SHIPPED | OUTPATIENT
Start: 2020-05-18 | End: 2020-09-07 | Stop reason: SDUPTHER

## 2020-05-27 ENCOUNTER — OFFICE VISIT (OUTPATIENT)
Dept: FAMILY MEDICINE CLINIC | Facility: CLINIC | Age: 65
End: 2020-05-27

## 2020-05-27 VITALS
WEIGHT: 235 LBS | DIASTOLIC BLOOD PRESSURE: 84 MMHG | OXYGEN SATURATION: 99 % | SYSTOLIC BLOOD PRESSURE: 120 MMHG | BODY MASS INDEX: 36.88 KG/M2 | HEART RATE: 67 BPM | TEMPERATURE: 97.3 F | HEIGHT: 67 IN

## 2020-05-27 DIAGNOSIS — E53.8 VITAMIN B 12 DEFICIENCY: ICD-10-CM

## 2020-05-27 DIAGNOSIS — Z78.0 POSTMENOPAUSAL: ICD-10-CM

## 2020-05-27 DIAGNOSIS — E03.9 ACQUIRED HYPOTHYROIDISM: ICD-10-CM

## 2020-05-27 DIAGNOSIS — E11.9 TYPE 2 DIABETES MELLITUS WITHOUT COMPLICATION, WITHOUT LONG-TERM CURRENT USE OF INSULIN (HCC): ICD-10-CM

## 2020-05-27 DIAGNOSIS — I10 ESSENTIAL HYPERTENSION: ICD-10-CM

## 2020-05-27 DIAGNOSIS — Z00.00 MEDICARE ANNUAL WELLNESS VISIT, SUBSEQUENT: Primary | ICD-10-CM

## 2020-05-27 DIAGNOSIS — G30.1 LATE ONSET ALZHEIMER'S DISEASE WITHOUT BEHAVIORAL DISTURBANCE (HCC): ICD-10-CM

## 2020-05-27 DIAGNOSIS — K58.2 IRRITABLE BOWEL SYNDROME WITH BOTH CONSTIPATION AND DIARRHEA: ICD-10-CM

## 2020-05-27 DIAGNOSIS — F02.80 LATE ONSET ALZHEIMER'S DISEASE WITHOUT BEHAVIORAL DISTURBANCE (HCC): ICD-10-CM

## 2020-05-27 DIAGNOSIS — F32.A DEPRESSIVE DISORDER: ICD-10-CM

## 2020-05-27 DIAGNOSIS — Z13.820 ENCOUNTER FOR SCREENING FOR OSTEOPOROSIS: ICD-10-CM

## 2020-05-27 DIAGNOSIS — E78.2 MIXED HYPERLIPIDEMIA: ICD-10-CM

## 2020-05-27 LAB — HBA1C MFR BLD: 7.7 %

## 2020-05-27 PROCEDURE — 83036 HEMOGLOBIN GLYCOSYLATED A1C: CPT | Performed by: FAMILY MEDICINE

## 2020-05-27 PROCEDURE — G0439 PPPS, SUBSEQ VISIT: HCPCS | Performed by: FAMILY MEDICINE

## 2020-05-27 RX ORDER — FLASH GLUCOSE SCANNING READER
1 EACH MISCELLANEOUS TAKE AS DIRECTED
Qty: 1 DEVICE | Refills: 0 | Status: SHIPPED | OUTPATIENT
Start: 2020-05-27 | End: 2020-12-01

## 2020-05-27 RX ORDER — FLASH GLUCOSE SENSOR
1 KIT MISCELLANEOUS
Qty: 2 EACH | Refills: 11 | Status: SHIPPED | OUTPATIENT
Start: 2020-05-27 | End: 2020-12-01

## 2020-05-27 NOTE — PROGRESS NOTES
The ABCs of the Annual Wellness Visit  Subsequent Medicare Wellness Visit    Chief Complaint   Patient presents with   • Medicare Wellness-subsequent     would like to discuss continuous glucose monitor       Subjective   History of Present Illness:  Cassie Gomez is a 65 y.o. female who presents for a Subsequent Medicare Wellness Visit.    She has HTN which has been stable on cozaar. Denies side effects. No CP, palpitations, orthopnea, swelling.     She has HLP for which she is on statin. Tolerating well.     She has NIDDM which has been well controlled. Getting irregular exercise. Not recently following diabetic diet. She is UTD on eye exam. tolerating metformin well.     She has IBS with mixed constipation and diarrhea. Denies recent flares. Denies abd pain or blood in stool.     Has acquired hypoTH. Taking replacement as directed. Some fatigue. Otherwise stable.     Has chronic depression as well as alzhemers dementia. Will have f/u with Dr. Jean-Baptiste in June. On zoloft, aricept, namenda. Taking as directed. Denies side effects. No recent falls or injuries.  has some trouble getting her to take meds on occasion as well as have blood sugar checked with traditional lancet, requesting CGM. BG averaging 150 in AM.    Since last visit she had ER visit for presyncopal episode at chiropractic office. Negative w/u. No recurrence.      Pt's previous HPI reviewed and updated as indicated.     HEALTH RISK ASSESSMENT    Recent Hospitalizations:  No hospitalization(s) within the last year.    Current Medical Providers:  Patient Care Team:  Shannan Braden MD as PCP - General (Family Medicine)  Lew Jean-Baptiste MD as Consulting Physician (Neurology)  Judy Soliz MD as Surgeon (General Surgery)  Ching Prado MD as Consulting Physician (Obstetrics and Gynecology)    Smoking Status:  Social History     Tobacco Use   Smoking Status Never Smoker   Smokeless Tobacco Never Used       Alcohol  Consumption:  Social History     Substance and Sexual Activity   Alcohol Use No       Depression Screen:   PHQ-2/PHQ-9 Depression Screening 5/27/2020   Little interest or pleasure in doing things 0   Feeling down, depressed, or hopeless 0   Total Score 0       Fall Risk Screen:  DANIELLE Fall Risk Assessment was completed, and patient is at LOW risk for falls.Assessment completed on:5/27/2020    Health Habits and Functional and Cognitive Screening:  Functional & Cognitive Status 5/27/2020   Do you have difficulty preparing food and eating? No   Do you have difficulty bathing yourself, getting dressed or grooming yourself? No   Do you have difficulty using the toilet? No   Do you have difficulty moving around from place to place? No   Do you have trouble with steps or getting out of a bed or a chair? No   Current Diet Well Balanced Diet   Dental Exam Up to date   Eye Exam Up to date   Exercise (times per week) 0 times per week   Current Exercise Activities Include None   Do you need help using the phone?  Yes   Are you deaf or do you have serious difficulty hearing?  No   Do you need help with transportation? Yes   Do you need help shopping? Yes   Do you need help preparing meals?  Yes   Do you need help with housework?  No   Do you need help with laundry? No   Do you need help taking your medications? Yes   Do you need help managing money? Yes   Do you ever drive or ride in a car without wearing a seat belt? No   Have you felt unusual stress, anger or loneliness in the last month? No   Who do you live with? Spouse   If you need help, do you have trouble finding someone available to you? No   Have you been bothered in the last four weeks by sexual problems? -   Do you have difficulty concentrating, remembering or making decisions? Yes         Does the patient have evidence of cognitive impairment? Yes    Asprin use counseling:Does not need ASA (and currently is not on it)    Age-appropriate Screening Schedule:  Refer  to the list below for future screening recommendations based on patient's age, sex and/or medical conditions. Orders for these recommended tests are listed in the plan section. The patient has been provided with a written plan.    Health Maintenance   Topic Date Due   • TDAP/TD VACCINES (1 - Tdap) [birth year]/1965   • ZOSTER VACCINE (2 of 3) 11/21/2017   • DIABETIC FOOT EXAM  05/26/2019   • URINE MICROALBUMIN  06/01/2021 (Originally 5/21/2020)   • INFLUENZA VACCINE  08/01/2020   • HEMOGLOBIN A1C  08/24/2020   • COLONOSCOPY  01/12/2021   • LIPID PANEL  02/24/2021   • DIABETIC EYE EXAM  03/02/2021   • MAMMOGRAM  01/31/2022          The following portions of the patient's history were reviewed and updated as appropriate: allergies, current medications, past family history, past medical history, past social history, past surgical history and problem list.    Outpatient Medications Prior to Visit   Medication Sig Dispense Refill   • Cholecalciferol (VITAMIN D3) 5000 UNITS capsule capsule Take 5,000 Units by mouth Daily.     • Cyanocobalamin (VITAMIN B-12) 500 MCG sublingual tablet      • donepezil (Aricept) 10 MG tablet Take 1 tablet by mouth 2 (Two) Times a Day. 180 tablet 3   • glipizide (GLUCOTROL) 5 MG tablet Take 1 tablet by mouth 2 (Two) Times a Day Before Meals. 180 tablet 1   • levothyroxine (SYNTHROID, LEVOTHROID) 50 MCG tablet Take 1 tablet by mouth Daily. 90 tablet 0   • losartan (COZAAR) 100 MG tablet Take 1 tablet by mouth Daily. 90 tablet 1   • lovastatin (MEVACOR) 40 MG tablet Take 1 tablet by mouth Every Night. 90 tablet 1   • memantine (NAMENDA) 10 MG tablet Take 1 tablet by mouth 2 (Two) Times a Day. 180 tablet 3   • metFORMIN (GLUCOPHAGE) 500 MG tablet Take 2 tablets by mouth 2 (Two) Times a Day With Meals. 360 tablet 0   • Multiple Vitamins-Minerals (PRESERVISION AREDS 2+MULTI VIT PO)      • sertraline (ZOLOFT) 50 MG tablet Take 1 tablet by mouth Daily. 90 tablet 3     No facility-administered medications  prior to visit.        Patient Active Problem List   Diagnosis   • Late onset Alzheimer's disease without behavioral disturbance (CMS/HCC)   • Type 2 diabetes mellitus without complication, without long-term current use of insulin (CMS/[facility])   • Acquired hypothyroidism   • Hyperlipidemia   • Irritable bowel syndrome with both constipation and diarrhea   • Essential hypertension   • Depressive disorder       Advanced Care Planning:  ACP discussion was held with the patient during this visit. Patient does not have an advance directive, information provided.    Review of Systems   Constitutional: Positive for fatigue. Negative for appetite change, fever and unexpected weight change.   HENT: Negative for congestion, mouth sores, nosebleeds, rhinorrhea, sore throat and trouble swallowing.    Eyes: Negative for visual disturbance.   Respiratory: Negative for cough, shortness of breath and wheezing.    Cardiovascular: Negative for chest pain, palpitations and leg swelling.   Gastrointestinal: Negative for abdominal pain, blood in stool, constipation, diarrhea, nausea and vomiting.   Endocrine: Negative for heat intolerance, polydipsia and polyuria.   Genitourinary: Negative for dysuria, hematuria and vaginal bleeding.   Musculoskeletal: Negative for arthralgias and myalgias.   Skin: Negative for rash and wound.   Neurological: Negative for dizziness, tremors, syncope, weakness and headaches.   Hematological: Negative for adenopathy. Does not bruise/bleed easily.   Psychiatric/Behavioral: Positive for confusion, decreased concentration and dysphoric mood. Negative for sleep disturbance and suicidal ideas. The patient is not nervous/anxious.         Decreased memory   Pt's previous ROS reviewed and updated as indicated.       Compared to one year ago, the patient feels her physical health is the same.  Compared to one year ago, the patient feels her mental health is worse.    Reviewed chart for potential of high risk  "medication in the elderly: yes  Reviewed chart for potential of harmful drug interactions in the elderly:yes    Objective         Vitals:    05/27/20 1015   BP: 120/84   Pulse: 67   Temp: 97.3 °F (36.3 °C)   SpO2: 99%   Weight: 107 kg (235 lb)   Height: 170.2 cm (67\")   PainSc: 0-No pain       Body mass index is 36.81 kg/m².  Discussed the patient's BMI with her. The BMI is above average; BMI management plan is completed.    Physical Exam   Constitutional: She appears well-developed and well-nourished. She is cooperative. She does not appear ill. No distress.   obese   HENT:   Head: Normocephalic and atraumatic.   Mouth/Throat: Mucous membranes are normal. Mucous membranes are not dry.   Eyes: Conjunctivae and lids are normal. No scleral icterus. Right eye exhibits no nystagmus. Left eye exhibits no nystagmus.   Neck: Phonation normal. Neck supple. Normal carotid pulses present. Thyromegaly (mild on right) present.   Cardiovascular: Normal rate, regular rhythm, S1 normal, S2 normal and intact distal pulses. Exam reveals no gallop.   No murmur heard.  Pulmonary/Chest: Effort normal and breath sounds normal.   Musculoskeletal: She exhibits no edema, tenderness or deformity.    Cassie had a diabetic foot exam performed today.   During the foot exam she had a monofilament test performed (some diffuse diminished sensation but has difficulty understanding instructions- reflexively jerks with monofilament touch but states she \"can't feel it\").  Vascular Status -  Her right foot exhibits no edema. Her left foot exhibits no edema.  Skin Integrity  -  Her right foot skin is intact. She has callous right foot.  She has no right foot ulcer.Her left foot skin is intact.She has callous left foot. She has no left foot ulcer..  Neurological: She is alert. She has normal strength. She displays no tremor. Gait normal.   Skin: Skin is warm and dry. No ecchymosis and no rash noted.       Psychiatric: Her speech is normal. Thought " "content normal. Her affect is blunt. She is slowed (mildly). Cognition and memory are impaired.   Good eye contact. Answers questions appropriately. Good personal hygiene and grooming. Gives limited answers, generally \"yes\", \"no\". Difficulty following multi-step instructions.   Nursing note and vitals reviewed.  Pt's previous physical exam reviewed and updated as indicated.      Lab Results   Component Value Date    HGBA1C 7.7 05/27/2020      Lab Results   Component Value Date    WBC 12.11 (H) 05/01/2020    HGB 13.8 05/01/2020    HCT 44.3 05/01/2020    MCV 86.4 05/01/2020     05/01/2020       Lab Results   Component Value Date    GLUCOSE 196 (H) 05/01/2020    BUN 11 05/01/2020    CREATININE 0.92 05/01/2020    EGFRIFNONA 61 05/01/2020    EGFRIFAFRI 83 02/24/2020    BCR 12.0 05/01/2020    K 4.3 05/01/2020    CO2 26.4 05/01/2020    CALCIUM 10.1 05/01/2020    PROTENTOTREF 6.8 02/24/2020    ALBUMIN 4.70 05/01/2020    LABIL2 1.5 02/24/2020    AST 35 (H) 05/01/2020    ALT 20 05/01/2020       Lab Results   Component Value Date    CHLPL 153 02/24/2020    TRIG 77 02/24/2020    HDL 68 (H) 02/24/2020    LDL 70 02/24/2020       Lab Results   Component Value Date    TSH 1.640 02/24/2020       Lab Results   Component Value Date    HGBA1C 7.7 05/27/2020    HGBA1C 6.9 02/24/2020    HGBA1C 8.3 11/21/2019     Lab Results   Component Value Date    MICROALBUR 7.6 02/05/2018    CREATININE 0.92 05/01/2020       Assessment/Plan   Medicare Risks and Personalized Health Plan  CMS Preventative Services Quick Reference  Advance Directive Discussion  Dementia/Memory   Depression/Dysphoria  Fall Risk  Immunizations Discussed/Encouraged (specific immunizations; adacel Tdap and Shingrix )  Inactivity/Sedentary  Obesity/Overweight   Osteoprorosis Risk    The above risks/problems have been discussed with the patient.  Pertinent information has been shared with the patient in the After Visit Summary.  Follow up plans and orders are seen below " in the Assessment/Plan Section.    Diagnoses and all orders for this visit:    1. Medicare annual wellness visit, subsequent (Primary)    2. Essential hypertension    3. Mixed hyperlipidemia    4. Type 2 diabetes mellitus without complication, without long-term current use of insulin (CMS/[facility])  -     POC Glycosylated Hemoglobin (Hb A1C)    5. Irritable bowel syndrome with both constipation and diarrhea    6. Vitamin B 12 deficiency    7. Acquired hypothyroidism    8. Late onset Alzheimer's disease without behavioral disturbance (CMS/[facility])    9. Depressive disorder    10. Postmenopausal  -     DEXA Bone Density Axial; Future    11. Encounter for screening for osteoporosis  -     DEXA Bone Density Axial; Future    Other orders  -     Continuous Blood Gluc  (FREESTYLE EDUARD 14 DAY READER) device; 1 Device Take As Directed.  Dispense: 1 Device; Refill: 0  -     Continuous Blood Gluc Sensor (FREESTYLE EDUARD 14 DAY SENSOR) misc; 1 each Every 14 (Fourteen) Days.  Dispense: 2 each; Refill: 11      Chronic medical conditions appear stable. A1c up some from previous. Patient encouraged to take meds as rx'd, follow diabetic appropriate diet, exercise daily, perform feet check daily, and have yearly diabetic eye exams.  Good tolerance of current medications.   will check into cost/coverage of Tdap, Shingrix with insurance and/or local pharmacy.    Follow Up:  Return in about 3 months (around 8/27/2020).   F/u sooner as needed.  F/u with Dr. Jean-Baptiste as scheduled next month.  I will contact patient/ regarding test results and provide instructions regarding any necessary changes in plan of care.  Patient/ encouraged to keep me informed of any acute changes, or any new concerning symptoms.  Pt/ aware of reasons to seek emergent care.  Patient/ voiced understanding of all instructions and denied further questions.    An After Visit Summary and PPPS were given to the patient.

## 2020-05-27 NOTE — PATIENT INSTRUCTIONS
Advance Care Planning and Advance Directives     You make decisions on a daily basis - decisions about where you want to live, your career, your home, your life. Perhaps one of the most important decisions you face is your choice for future medical care. Take time to talk with your family and your healthcare team and start planning today.  Advance Care Planning is a process that can help you:  · Understand possible future healthcare decisions in light of your own experiences  · Reflect on those decision in light of your goals and values  · Discuss your decisions with those closest to you and the healthcare professionals that care for you  · Make a plan by creating a document that reflects your wishes    Surrogate Decision Maker  In the event of a medical emergency, which has left you unable to communicate or to make your own decisions, you would need someone to make decisions for you.  It is important to discuss your preferences for medical treatment with this person while you are in good health.     Qualities of a surrogate decision maker:  • Willing to take on this role and responsibility  • Knows what you want for future medical care  • Willing to follow your wishes even if they don't agree with them  • Able to make difficult medical decisions under stressful circumstances    Advance Directives  These are legal documents you can create that will guide your healthcare team and decision maker(s) when needed. These documents can be stored in the electronic medical record.    · Living Will - a legal document to guide your care if you have a terminal condition or a serious illness and are unable to communicate. States vary by statute in document names/types, but most forms may include one or more of the following:        -  Directions regarding life-prolonging treatments        -  Directions regarding artificially provided nutrition/hydration        -  Choosing a healthcare decision maker        -  Direction  regarding organ/tissue donation    · Durable Power of  for Healthcare - this document names an -in-fact to make medical decisions for you, but it may also allow this person to make personal and financial decisions for you. Please seek the advice of an  if you need this type of document.    **Advance Directives are not required and no one may discriminate against you if you do not sign one.    Medical Orders  Many states allow specific forms/orders signed by your physician to record your wishes for medical treatment in your current state of health. This form, signed in personal communication with your physician, addresses resuscitation and other medical interventions that you may or may not want.      For more information or to schedule a time with a [facility] Advance Care Planning Facilitator contact: [facility] or call [phone] and someone will contact you directly.    Preventive Care 65 Years and Older, Female  Preventive care refers to lifestyle choices and visits with your health care provider that can promote health and wellness. This includes:  · A yearly physical exam. This is also called an annual well check.  · Regular dental and eye exams.  · Immunizations.  · Screening for certain conditions.  · Healthy lifestyle choices, such as diet and exercise.  What can I expect for my preventive care visit?  Physical exam  Your health care provider will check:  · Height and weight. These may be used to calculate body mass index (BMI), which is a measurement that tells if you are at a healthy weight.  · Heart rate and blood pressure.  · Your skin for abnormal spots.  Counseling  Your health care provider may ask you questions about:  · Alcohol, tobacco, and drug use.  · Emotional well-being.  · Home and relationship well-being.  · Sexual activity.  · Eating habits.  · History of falls.  · Memory and ability to understand (cognition).  · Work and work  environment.  · Pregnancy and menstrual history.  What immunizations do I need?    Influenza (flu) vaccine  · This is recommended every year.  Tetanus, diphtheria, and pertussis (Tdap) vaccine  · You may need a Td booster every 10 years.  Varicella (chickenpox) vaccine  · You may need this vaccine if you have not already been vaccinated.  Zoster (shingles) vaccine  · You may need this after age 60.  Pneumococcal conjugate (PCV13) vaccine  · One dose is recommended after age 65.  Pneumococcal polysaccharide (PPSV23) vaccine  · One dose is recommended after age 65.  Measles, mumps, and rubella (MMR) vaccine  · You may need at least one dose of MMR if you were born in 1957 or later. You may also need a second dose.  Meningococcal conjugate (MenACWY) vaccine  · You may need this if you have certain conditions.  Hepatitis A vaccine  · You may need this if you have certain conditions or if you travel or work in places where you may be exposed to hepatitis A.  Hepatitis B vaccine  · You may need this if you have certain conditions or if you travel or work in places where you may be exposed to hepatitis B.  Haemophilus influenzae type b (Hib) vaccine  · You may need this if you have certain conditions.  You may receive vaccines as individual doses or as more than one vaccine together in one shot (combination vaccines). Talk with your health care provider about the risks and benefits of combination vaccines.  What tests do I need?  Blood tests  · Lipid and cholesterol levels. These may be checked every 5 years, or more frequently depending on your overall health.  · Hepatitis C test.  · Hepatitis B test.  Screening  · Lung cancer screening. You may have this screening every year starting at age 55 if you have a 30-pack-year history of smoking and currently smoke or have quit within the past 15 years.  · Colorectal cancer screening. All adults should have this screening starting at age 50 and continuing until age 75. Your  health care provider may recommend screening at age 45 if you are at increased risk. You will have tests every 1-10 years, depending on your results and the type of screening test.  · Diabetes screening. This is done by checking your blood sugar (glucose) after you have not eaten for a while (fasting). You may have this done every 1-3 years.  · Mammogram. This may be done every 1-2 years. Talk with your health care provider about how often you should have regular mammograms.  · BRCA-related cancer screening. This may be done if you have a family history of breast, ovarian, tubal, or peritoneal cancers.  Other tests  · Sexually transmitted disease (STD) testing.  · Bone density scan. This is done to screen for osteoporosis. You may have this done starting at age 65.  Follow these instructions at home:  Eating and drinking  · Eat a diet that includes fresh fruits and vegetables, whole grains, lean protein, and low-fat dairy products. Limit your intake of foods with high amounts of sugar, saturated fats, and salt.  · Take vitamin and mineral supplements as recommended by your health care provider.  · Do not drink alcohol if your health care provider tells you not to drink.  · If you drink alcohol:  ? Limit how much you have to 0-1 drink a day.  ? Be aware of how much alcohol is in your drink. In the [place] one drink equals one 12 oz bottle of beer (355 mL), one 5 oz glass of wine (148 mL), or one 1½ oz glass of hard liquor (44 mL).  Lifestyle  · Take daily care of your teeth and gums.  · Stay active. Exercise for at least 30 minutes on 5 or more days each week.  · Do not use any products that contain nicotine or tobacco, such as cigarettes, e-cigarettes, and chewing tobacco. If you need help quitting, ask your health care provider.  · If you are sexually active, practice safe sex. Use a condom or other form of protection in order to prevent STIs (sexually transmitted infections).  · Talk with your health care provider  about taking a low-dose aspirin or statin.  What's next?  · Go to your health care provider once a year for a well check visit.  · Ask your health care provider how often you should have your eyes and teeth checked.  · Stay up to date on all vaccines.  This information is not intended to replace advice given to you by your health care provider. Make sure you discuss any questions you have with your health care provider.  Document Released: 2017 Document Revised: 2019 Document Reviewed: 2019  Elsevier Patient Education ©  [employer]      Medicare Wellness  Personal Prevention Plan of Service     Date of Office Visit:  2020  Encounter Provider:  Shannan Braden MD  Place of Service:  [facility] PRIMARY [facility]  Patient Name: Cassei Vasquez Jason  :  1955    As part of the Medicare Wellness portion of your visit today, we are providing you with this personalized preventive plan of services (PPPS). This plan is based upon recommendations of the [place] Preventive Services Task Force (USPSTF) and the Advisory Committee on Immunization Practices (ACIP).    This lists the preventive care services that should be considered, and provides dates of when you are due. Items listed as completed are up-to-date and do not require any further intervention.    Health Maintenance   Topic Date Due   • TDAP/TD VACCINES (1 - Tdap) 1965   • ZOSTER VACCINE (2 of 3) 2017   • DIABETIC FOOT EXAM  2019   • MEDICARE ANNUAL WELLNESS  2020   • URINE MICROALBUMIN  2020   • INFLUENZA VACCINE  2020   • HEMOGLOBIN A1C  2020   • COLONOSCOPY  2021   • LIPID PANEL  2021   • DIABETIC EYE EXAM  2021   • MAMMOGRAM  2022   • HEPATITIS C SCREENING  Completed   • Pneumococcal Vaccine Once at 65 Years Old  Completed       No orders of the defined types were placed in this encounter.      Return in about 3 months (around 2020).

## 2020-06-01 DIAGNOSIS — E11.9 TYPE 2 DIABETES MELLITUS WITHOUT COMPLICATION, WITHOUT LONG-TERM CURRENT USE OF INSULIN (HCC): ICD-10-CM

## 2020-06-02 RX ORDER — LOSARTAN POTASSIUM 100 MG/1
100 TABLET ORAL DAILY
Qty: 90 TABLET | Refills: 1 | Status: SHIPPED | OUTPATIENT
Start: 2020-06-02 | End: 2020-07-09 | Stop reason: SDUPTHER

## 2020-06-08 ENCOUNTER — OFFICE VISIT (OUTPATIENT)
Dept: NEUROLOGY | Facility: CLINIC | Age: 65
End: 2020-06-08

## 2020-06-08 VITALS
BODY MASS INDEX: 37.2 KG/M2 | HEIGHT: 67 IN | WEIGHT: 237 LBS | OXYGEN SATURATION: 97 % | DIASTOLIC BLOOD PRESSURE: 82 MMHG | HEART RATE: 69 BPM | SYSTOLIC BLOOD PRESSURE: 150 MMHG

## 2020-06-08 DIAGNOSIS — G30.1 LATE ONSET ALZHEIMER'S DISEASE WITHOUT BEHAVIORAL DISTURBANCE (HCC): Primary | ICD-10-CM

## 2020-06-08 DIAGNOSIS — F02.80 LATE ONSET ALZHEIMER'S DISEASE WITHOUT BEHAVIORAL DISTURBANCE (HCC): Primary | ICD-10-CM

## 2020-06-08 PROCEDURE — 99214 OFFICE O/P EST MOD 30 MIN: CPT | Performed by: PSYCHIATRY & NEUROLOGY

## 2020-06-08 RX ORDER — RIVASTIGMINE 13.3 MG/24H
1 PATCH, EXTENDED RELEASE TRANSDERMAL DAILY
Qty: 90 PATCH | Refills: 3 | Status: SHIPPED | OUTPATIENT
Start: 2020-06-08 | End: 2021-05-18

## 2020-06-08 NOTE — PROGRESS NOTES
"Subjective     Chief Complaint: memory loss      History of Present Illness   Cassie Gomez is a 65 y.o. female who returns to clinic today with a history of Alzheimer's Disease. Her  has noted symptoms since approximately mid-2016 marked largely by forgetfulness. This has gradually worsened over time. Additional associated symptoms have included impairments in orientation and executive function.      Prior evaluation has included screening blood work and an MRI of the brain which were unremarkable. She is currently taking donepezil 10 mg BID and memantine.    Since her last visit on 11/19/19 her cognition has continued to decline, now seeing more impairments in language. She is not having significant issues with depression or anxiety. Her general health is also unchanged.    I have reviewed and confirmed the past family, social and medical history as accurate on 6/8/20.     Review of Systems   Constitutional: Negative.    Respiratory: Negative.    Cardiovascular: Negative.    Gastrointestinal: Negative.    Genitourinary: Negative.        Objective     /82   Pulse 69   Ht 170.2 cm (67\")   Wt 108 kg (237 lb)   SpO2 97%   BMI 37.12 kg/m²     General appearance today is normal.       Physical Exam   Neurological: She has normal strength. She has a normal Finger-Nose-Finger Test.   Psychiatric: Her speech is normal.        Neurologic Exam     Mental Status   Oriented to person.   Disoriented to place.   Disoriented to time.   Registration: recalls 3 of 3 objects. Recall of objects at 5 minutes: 0/3. Follows 3 step commands.   Attention: normal.   Speech: speech is normal   Level of consciousness: alert  Unable to name object. Able to read. Able to repeat. Unable to write. Normal comprehension.     Cranial Nerves   Cranial nerves II through XII intact.     Motor Exam   Muscle bulk: normal  Overall muscle tone: normal    Strength   Strength 5/5 throughout.     Gait, Coordination, and Reflexes "     Coordination   Finger to nose coordination: normal        Results  MMSE=11      Assessment/Plan   Cassie was seen today for alzheimer's disease and follow-up.    Diagnoses and all orders for this visit:    Late onset Alzheimer's disease without behavioral disturbance (CMS/HCC)    Other orders  -     rivastigmine (EXELON) 13.3 MG/24HR patch; Place 1 patch on the skin as directed by provider Daily.          Discussion/Summary   Cassie Gomez returns to clinic today with a history of Alzheimer's Disease . I discussed her medications, and as medication administration is becoming difficult, it was elected to try switching from donepezil to Exelon patch for convenience. As she is taking a high dosage of donepezil currently (10 mg bid), I will switch to Exelon patch 13.3 mg daily. She will then follow up in 6 months, or sooner if needed.     As part of this visit I obtained additional history from the family which is incorporated in the HPI.      Lew Jean-Baptiste MD

## 2020-07-10 RX ORDER — LEVOTHYROXINE SODIUM 0.05 MG/1
50 TABLET ORAL DAILY
Qty: 90 TABLET | Refills: 0 | Status: SHIPPED | OUTPATIENT
Start: 2020-07-10 | End: 2020-11-16 | Stop reason: SDUPTHER

## 2020-07-10 RX ORDER — LOSARTAN POTASSIUM 100 MG/1
100 TABLET ORAL DAILY
Qty: 90 TABLET | Refills: 1 | Status: SHIPPED | OUTPATIENT
Start: 2020-07-10 | End: 2020-11-16 | Stop reason: SDUPTHER

## 2020-07-14 ENCOUNTER — PATIENT MESSAGE (OUTPATIENT)
Dept: FAMILY MEDICINE CLINIC | Facility: CLINIC | Age: 65
End: 2020-07-14

## 2020-07-14 DIAGNOSIS — E11.9 TYPE 2 DIABETES MELLITUS WITHOUT COMPLICATION, WITHOUT LONG-TERM CURRENT USE OF INSULIN (HCC): Primary | ICD-10-CM

## 2020-07-14 NOTE — TELEPHONE ENCOUNTER
From: Cassie Gomez  To: Shannan Braden MD  Sent: 7/14/2020 9:23 AM EDT  Subject: Prescription Question    Dr. Braden, I am trying to save some money on Cassie's FreeStyle. I can get FreeStyle Test Strips at [facility]. They said, if you send in a prescription to them, we could get the test strips cheaper with the help of the Insurance. Would you mind prescribing the FreeStyle Light Test Strips, 100 count. I would really appreciate it. The FreeStyle 14 day system just didn't work out for Cassie, she would end up taking it off of her arm, and the cost is just way to much. Thank you for your help. Mitchel Gomez

## 2020-08-31 ENCOUNTER — OFFICE VISIT (OUTPATIENT)
Dept: FAMILY MEDICINE CLINIC | Facility: CLINIC | Age: 65
End: 2020-08-31

## 2020-08-31 ENCOUNTER — TELEPHONE (OUTPATIENT)
Dept: FAMILY MEDICINE CLINIC | Facility: CLINIC | Age: 65
End: 2020-08-31

## 2020-08-31 VITALS
OXYGEN SATURATION: 97 % | SYSTOLIC BLOOD PRESSURE: 130 MMHG | HEIGHT: 67 IN | DIASTOLIC BLOOD PRESSURE: 80 MMHG | HEART RATE: 70 BPM | TEMPERATURE: 96.8 F | BODY MASS INDEX: 35.31 KG/M2 | WEIGHT: 225 LBS

## 2020-08-31 DIAGNOSIS — G30.1 LATE ONSET ALZHEIMER'S DISEASE WITHOUT BEHAVIORAL DISTURBANCE (HCC): ICD-10-CM

## 2020-08-31 DIAGNOSIS — F32.A DEPRESSIVE DISORDER: ICD-10-CM

## 2020-08-31 DIAGNOSIS — I10 ESSENTIAL HYPERTENSION: ICD-10-CM

## 2020-08-31 DIAGNOSIS — F02.80 LATE ONSET ALZHEIMER'S DISEASE WITHOUT BEHAVIORAL DISTURBANCE (HCC): ICD-10-CM

## 2020-08-31 DIAGNOSIS — E78.2 MIXED HYPERLIPIDEMIA: ICD-10-CM

## 2020-08-31 DIAGNOSIS — E03.9 ACQUIRED HYPOTHYROIDISM: ICD-10-CM

## 2020-08-31 DIAGNOSIS — E11.9 TYPE 2 DIABETES MELLITUS WITHOUT COMPLICATION, WITHOUT LONG-TERM CURRENT USE OF INSULIN (HCC): Primary | ICD-10-CM

## 2020-08-31 LAB — HBA1C MFR BLD: 7.4 %

## 2020-08-31 PROCEDURE — 99214 OFFICE O/P EST MOD 30 MIN: CPT | Performed by: FAMILY MEDICINE

## 2020-08-31 PROCEDURE — 83036 HEMOGLOBIN GLYCOSYLATED A1C: CPT | Performed by: FAMILY MEDICINE

## 2020-08-31 RX ORDER — 1.1% SODIUM FLUORIDE PRESCRIPTION DENTAL CREAM 5 MG/G
CREAM DENTAL SEE ADMIN INSTRUCTIONS
COMMUNITY
Start: 2020-06-09

## 2020-08-31 NOTE — PROGRESS NOTES
"Subjective   Cassie Gomez is a 65 y.o. female.     History of Present Illness   Mrs. Gomez presents today with her  for routine f/u on several chronic issues.    She has HTN which has been stable on cozaar. Denies side effects. No CP, palpitations, orthopnea, swelling.     She has HLP for which she is on statin. Tolerating well.     She has NIDDM which has been well controlled. Getting irregular exercise. Not recently following diabetic diet. She is UTD on eye exam. tolerating metformin well. Some trouble with med compliance due to dementia.     Has acquired hypoTH. Taking replacement as directed. Some fatigue. Otherwise stable.     Has chronic depression as well as alzhemers dementia. Had f/u with Dr. Jean-Baptiste. nancy dc'd, placed on exelon. Generally tolerating. occ removes patch during night. On Namenda as well. On zoloft for depression, mood stable. Denies side effects. No recent falls or injuries.  has some trouble getting her to take meds; feels this is worsening over time. Obtained Freestyle but it was quite expensive. Using regular test strips now.    Her weight is down 8 lbs, she is \"Not eating as much\". No abd pain, no dysphagia although she sometimes \"Forgets what she is supposed to do with pills once they are in her mouth\".    No other new complaints today.    Pt's previous HPI reviewed and updated as indicated.     The following portions of the patient's history were reviewed and updated as appropriate: allergies, current medications, past family history, past medical history, past social history, past surgical history and problem list.    Review of Systems   Constitutional: Positive for fatigue. Negative for appetite change, fever and unexpected weight change.   HENT: Negative for congestion, mouth sores, nosebleeds, rhinorrhea, sore throat and trouble swallowing.    Eyes: Negative for visual disturbance.   Respiratory: Negative for cough, shortness of breath and wheezing.  "   Cardiovascular: Negative for chest pain, palpitations and leg swelling.   Gastrointestinal: Negative for abdominal pain, blood in stool, constipation, diarrhea, nausea and vomiting.   Endocrine: Negative for heat intolerance, polydipsia and polyuria.   Genitourinary: Negative for dysuria, hematuria and vaginal bleeding.   Musculoskeletal: Negative for arthralgias and myalgias.   Skin: Negative for rash and wound.   Neurological: Negative for dizziness, tremors, syncope, weakness and headaches.   Hematological: Negative for adenopathy. Does not bruise/bleed easily.   Psychiatric/Behavioral: Positive for confusion, decreased concentration and dysphoric mood. Negative for sleep disturbance and suicidal ideas. The patient is not nervous/anxious.         Decreased memory   Pt's previous ROS reviewed and updated as indicated.       Objective    Vitals:    08/31/20 0928   BP: 130/80   Pulse: 70   Temp: 96.8 °F (36 °C)   SpO2: 97%     Body mass index is 35.24 kg/m².      08/31/20 0928   Weight: 102 kg (225 lb)       Physical Exam   Constitutional: She is oriented to person, place, and time. She appears well-developed and well-nourished. She is cooperative. She does not appear ill. No distress.   obese   HENT:   Head: Normocephalic and atraumatic.   Eyes: Conjunctivae are normal. No scleral icterus. Right eye exhibits no nystagmus. Left eye exhibits no nystagmus.   Neck: Phonation normal. Neck supple. Normal carotid pulses present. Carotid bruit is not present. Thyromegaly (mild on right) present. No thyroid mass present.   Cardiovascular: Normal rate, regular rhythm, S1 normal, S2 normal and intact distal pulses. Exam reveals no gallop.   No murmur heard.  Pulmonary/Chest: Effort normal and breath sounds normal.   Musculoskeletal: She exhibits no edema, tenderness or deformity.     Vascular Status -  Her right foot exhibits no edema. Her left foot exhibits no edema.  Neurological: She is alert and oriented to person,  "place, and time. She has normal strength. She displays no tremor. Gait normal.   Skin: Skin is warm and dry. No ecchymosis and no rash noted.       Psychiatric: Her speech is normal. Thought content normal. Her affect is blunt (but overall improved affect from previous). She is slowed (mildly). Cognition and memory are impaired.   Good eye contact. Answers questions appropriately. Good personal hygiene and grooming. Gives limited answers, generally \"yes\", \"no\".   Nursing note and vitals reviewed.  Pt's previous physical exam reviewed and updated as indicated.    Lab Results   Component Value Date    HGBA1C 7.4 08/31/2020    HGBA1C 7.7 05/27/2020    HGBA1C 6.9 02/24/2020     Lab Results   Component Value Date    MICROALBUR 7.6 02/05/2018    CREATININE 0.92 05/01/2020     Lab Results   Component Value Date    WBC 12.11 (H) 05/01/2020    HGB 13.8 05/01/2020    HCT 44.3 05/01/2020    MCV 86.4 05/01/2020     05/01/2020       Lab Results   Component Value Date    GLUCOSE 196 (H) 05/01/2020    BUN 11 05/01/2020    CREATININE 0.92 05/01/2020    EGFRIFNONA 61 05/01/2020    EGFRIFAFRI 83 02/24/2020    BCR 12.0 05/01/2020    K 4.3 05/01/2020    CO2 26.4 05/01/2020    CALCIUM 10.1 05/01/2020    PROTENTOTREF 6.8 02/24/2020    ALBUMIN 4.70 05/01/2020    LABIL2 1.5 02/24/2020    AST 35 (H) 05/01/2020    ALT 20 05/01/2020       Lab Results   Component Value Date    CHLPL 153 02/24/2020    TRIG 77 02/24/2020    HDL 68 (H) 02/24/2020    LDL 70 02/24/2020       Lab Results   Component Value Date    TSH 1.640 02/24/2020           Assessment/Plan   Cassie was seen today for follow-up and diabetes.    Diagnoses and all orders for this visit:    Type 2 diabetes mellitus without complication, without long-term current use of insulin (CMS/[facility])  -     POC Glycosylated Hemoglobin (Hb A1C)    Essential hypertension    Mixed hyperlipidemia    Acquired hypothyroidism    Late onset Alzheimer's disease without behavioral disturbance " (CMS/[facility])    Depressive disorder    Other orders  -     Cancel: POC Glycosylated Hemoglobin (Hb A1C)       NIDDM at goal for pt's age, risk factors etc. As she is having increased med compliance difficulty due to Alzhemiers, recommend d/c glipizide, continue metformin for now. Patient encouraged to take meds as rx'd, follow diabetic appropriate diet, exercise daily, perform feet check daily, and have yearly diabetic eye exams.    HTN controlled, continue ARB. HLP with good tolerance of statin. Pt advised to eat a heart healthy diet and get regular aerobic exercise.    Appears euthyroid, continue current replacement dose.    Moods stable. Continue zoloft.    F/u with neurology for mgnt of alzheimers dementia.     Routine f/u in 3 months, sooner as needed.  Patient was encouraged to keep me informed of any acute changes, lack of improvement, or any new concerning symptoms.  Pt is aware of reasons to seek emergent care.  Patient voiced understanding of all instructions and denied further questions.

## 2020-09-01 NOTE — TELEPHONE ENCOUNTER
Please inform pt's  her A1c was 7.4. I would recommend she d/c glipizide, continue metformin. Still would like him to investigate other covered meds with insurance, particularly injectable meds.

## 2020-09-08 RX ORDER — LOVASTATIN 40 MG/1
40 TABLET ORAL NIGHTLY
Qty: 90 TABLET | Refills: 1 | Status: SHIPPED | OUTPATIENT
Start: 2020-09-08 | End: 2021-01-11 | Stop reason: SDUPTHER

## 2020-09-09 RX ORDER — DONEPEZIL HYDROCHLORIDE 10 MG/1
1 TABLET, FILM COATED ORAL 2 TIMES DAILY
COMMUNITY
Start: 2020-09-07 | End: 2020-09-14 | Stop reason: SDUPTHER

## 2020-09-09 RX ORDER — DONEPEZIL HYDROCHLORIDE 10 MG/1
10 TABLET, FILM COATED ORAL 2 TIMES DAILY
Qty: 60 TABLET | Refills: 5 | OUTPATIENT
Start: 2020-09-09

## 2020-09-09 NOTE — TELEPHONE ENCOUNTER
PTS  CALLED REQUESTING A REFILL FOR:    DONEPEZIL 10MG  3 MOTH SUPPLY     [facility] - [place], [place] - [address] [place] [address] - [phone] [facility] [phone] FX

## 2020-09-10 DIAGNOSIS — F02.80 LATE ONSET ALZHEIMER'S DISEASE WITHOUT BEHAVIORAL DISTURBANCE (HCC): Primary | ICD-10-CM

## 2020-09-10 DIAGNOSIS — G30.1 LATE ONSET ALZHEIMER'S DISEASE WITHOUT BEHAVIORAL DISTURBANCE (HCC): Primary | ICD-10-CM

## 2020-09-10 RX ORDER — DONEPEZIL HYDROCHLORIDE 10 MG/1
10 TABLET, FILM COATED ORAL 2 TIMES DAILY
Qty: 60 TABLET | Refills: 3 | OUTPATIENT
Start: 2020-09-10

## 2020-09-10 NOTE — TELEPHONE ENCOUNTER
HUB: please advise pt she has 3 refills remaining on medication.      Spoke with pharmacy and she has filled the donepezil once and she has 3 refills, so the pharmacy said to not send in a new order for it.    Unable to leave voicemail with pt, mailbox was full.

## 2020-09-14 ENCOUNTER — TELEPHONE (OUTPATIENT)
Dept: FAMILY MEDICINE CLINIC | Facility: CLINIC | Age: 65
End: 2020-09-14

## 2020-09-14 NOTE — TELEPHONE ENCOUNTER
PT'S  TIFFANIE RENETTA CALLING STATING THAT PT IS COMPLETELY OUT OF MEDICATION DONEPEZIL.PT'S  WANTS IT SENT TO THE [facility].

## 2020-09-15 RX ORDER — DONEPEZIL HYDROCHLORIDE 10 MG/1
10 TABLET, FILM COATED ORAL 2 TIMES DAILY
Qty: 180 TABLET | Refills: 3 | Status: SHIPPED | OUTPATIENT
Start: 2020-09-15 | End: 2020-12-10 | Stop reason: SDUPTHER

## 2020-11-16 RX ORDER — LOSARTAN POTASSIUM 100 MG/1
100 TABLET ORAL DAILY
Qty: 90 TABLET | Refills: 1 | Status: SHIPPED | OUTPATIENT
Start: 2020-11-16 | End: 2020-12-10 | Stop reason: SDUPTHER

## 2020-11-16 RX ORDER — LEVOTHYROXINE SODIUM 0.05 MG/1
50 TABLET ORAL DAILY
Qty: 90 TABLET | Refills: 0 | Status: SHIPPED | OUTPATIENT
Start: 2020-11-16 | End: 2020-12-10 | Stop reason: SDUPTHER

## 2020-12-01 ENCOUNTER — OFFICE VISIT (OUTPATIENT)
Dept: FAMILY MEDICINE CLINIC | Facility: CLINIC | Age: 65
End: 2020-12-01

## 2020-12-01 VITALS
WEIGHT: 220 LBS | TEMPERATURE: 96.4 F | DIASTOLIC BLOOD PRESSURE: 75 MMHG | SYSTOLIC BLOOD PRESSURE: 130 MMHG | BODY MASS INDEX: 34.53 KG/M2 | HEIGHT: 67 IN

## 2020-12-01 DIAGNOSIS — G30.1 LATE ONSET ALZHEIMER'S DISEASE WITHOUT BEHAVIORAL DISTURBANCE (HCC): ICD-10-CM

## 2020-12-01 DIAGNOSIS — E78.2 MIXED HYPERLIPIDEMIA: ICD-10-CM

## 2020-12-01 DIAGNOSIS — F02.80 LATE ONSET ALZHEIMER'S DISEASE WITHOUT BEHAVIORAL DISTURBANCE (HCC): ICD-10-CM

## 2020-12-01 DIAGNOSIS — E03.9 ACQUIRED HYPOTHYROIDISM: ICD-10-CM

## 2020-12-01 DIAGNOSIS — I10 ESSENTIAL HYPERTENSION: ICD-10-CM

## 2020-12-01 DIAGNOSIS — E11.9 TYPE 2 DIABETES MELLITUS WITHOUT COMPLICATION, WITHOUT LONG-TERM CURRENT USE OF INSULIN (HCC): Primary | ICD-10-CM

## 2020-12-01 DIAGNOSIS — Z51.81 MEDICATION MONITORING ENCOUNTER: ICD-10-CM

## 2020-12-01 DIAGNOSIS — F32.A DEPRESSIVE DISORDER: ICD-10-CM

## 2020-12-01 PROCEDURE — 99214 OFFICE O/P EST MOD 30 MIN: CPT | Performed by: FAMILY MEDICINE

## 2020-12-01 NOTE — PROGRESS NOTES
Subjective   Cassie Gomez is a 65 y.o. female.     History of Present Illness  Mrs. Gomez presents today with her  for routine f/u on several chronic issues.     She has HTN which has been stable on cozaar. Denies side effects. No CP, palpitations, orthopnea, swelling.     She has HLP for which she is on statin. Tolerating well.     She has NIDDM which has been well controlled. Getting irregular exercise. Not recently following diabetic diet. She is UTD on eye exam. tolerating metformin well. Some trouble with med compliance due to dementia. At last visit, dc'd glipizide.     Has acquired hypoTH. Taking replacement as directed. Some fatigue. Otherwise stable.     Has chronic depression as well as alzhemers dementia. Had f/u with Dr. Jean-Baptiste. On exelon and namenda. Generally tolerating. occ removes patch during night. On zoloft for depression, mood stable. Denies side effects. No recent falls or injuries.  has some trouble getting her to take meds; no acute worsening since last visit.      No other new complaints today.y    BG lowest 129  Highest 170s  No hypoglycemic episodes    Pt's previous HPI reviewed and updated as indicated.     The following portions of the patient's history were reviewed and updated as appropriate: allergies, current medications, past family history, past medical history, past social history, past surgical history and problem list.    Review of Systems   Constitutional: Negative for appetite change, fatigue, fever and unexpected weight change.   HENT: Negative for congestion, mouth sores, nosebleeds, rhinorrhea, sore throat and trouble swallowing.    Eyes: Negative for visual disturbance.   Respiratory: Negative for cough, shortness of breath and wheezing.    Cardiovascular: Negative for chest pain, palpitations and leg swelling.   Gastrointestinal: Negative for abdominal pain, blood in stool, constipation, diarrhea, nausea and vomiting.   Endocrine: Negative for heat  intolerance, polydipsia, polyphagia and polyuria.   Genitourinary: Negative for dysuria, hematuria and vaginal bleeding.   Musculoskeletal: Negative for arthralgias and myalgias.   Skin: Negative for pallor, rash and wound.   Neurological: Negative for dizziness, tremors, seizures, syncope, speech difficulty, weakness and headaches.   Hematological: Negative for adenopathy. Does not bruise/bleed easily.   Psychiatric/Behavioral: Positive for confusion, decreased concentration and dysphoric mood. Negative for sleep disturbance and suicidal ideas. The patient is not nervous/anxious.         Decreased memory   Pt's previous ROS reviewed and updated as indicated.       Objective    Vitals:    12/01/20 0910   BP: 130/75   Temp: 96.4 °F (35.8 °C)     Body mass index is 34.45 kg/m².      12/01/20 0910   Weight: 99.8 kg (220 lb)       Physical Exam  Vitals signs and nursing note reviewed.   Constitutional:       General: She is not in acute distress.     Appearance: She is well-developed and well-groomed. She is obese.      Comments: Appears older than stated age   HENT:      Head: Normocephalic and atraumatic.   Eyes:      General: No scleral icterus.     Extraocular Movements: Extraocular movements intact.      Conjunctiva/sclera: Conjunctivae normal.   Neck:      Thyroid: Thyromegaly (mild on right) present. No thyroid mass.      Vascular: Normal carotid pulses.   Cardiovascular:      Rate and Rhythm: Normal rate and regular rhythm.      Pulses: Normal pulses.      Heart sounds: Normal heart sounds.   Pulmonary:      Effort: Pulmonary effort is normal.      Breath sounds: Normal breath sounds and air entry.   Musculoskeletal:      Right lower leg: No edema.      Left lower leg: No edema.   Lymphadenopathy:      Cervical: No cervical adenopathy.   Skin:     General: Skin is warm and dry.      Coloration: Skin is not jaundiced or pale.      Findings: No bruising or rash.   Neurological:      Mental Status: She is alert.  Mental status is at baseline.      Motor: Motor function is intact. No tremor.      Gait: Gait is intact.   Psychiatric:         Mood and Affect: Mood and affect normal.         Speech: Speech normal.         Behavior: Behavior normal. Behavior is cooperative.         Thought Content: Thought content normal.         Cognition and Memory: Memory is impaired.         Assessment/Plan   Diagnoses and all orders for this visit:    1. Type 2 diabetes mellitus without complication, without long-term current use of insulin (CMS/[facility]) (Primary)  -     Comprehensive Metabolic Panel  -     Hemoglobin A1c    2. Acquired hypothyroidism  -     TSH Rfx On Abnormal To Free T4    3. Essential hypertension  -     CBC (No Diff)  -     Comprehensive Metabolic Panel    4. Mixed hyperlipidemia  -     Comprehensive Metabolic Panel  -     Lipid Panel    5. Medication monitoring encounter  -     CBC (No Diff)  -     Comprehensive Metabolic Panel    6. Depressive disorder    7. Late onset Alzheimer's disease without behavioral disturbance (CMS/[facility])       NIDDM previously at goal. Recheck A1c today. Continue metformin, adjust as indicated. Patient encouraged to take meds as rx'd, follow diabetic appropriate diet, exercise daily, perform feet check daily, and have yearly diabetic eye exams.    Appears euthyroid. Continue replacement.    HTN controlled, continue losartan.     Continue statin for HLP.    alzheimers with depression which appears stable/improved on namenda, exelon, zoloft. F/u with neurology as scheduled.    Routine f/u in 3-6 months, sooner as needed/instructed.  I will contact patient regarding test results and provide instructions regarding any necessary changes in plan of care.  Patient was encouraged to keep me informed of any acute changes, or any new concerning symptoms.  Pt is aware of reasons to seek emergent care.  Patient voiced understanding of all instructions and denied further questions.

## 2020-12-02 LAB
ALBUMIN SERPL-MCNC: 4.2 G/DL (ref 3.5–5.2)
ALBUMIN/GLOB SERPL: 1.6 G/DL
ALP SERPL-CCNC: 99 U/L (ref 39–117)
ALT SERPL-CCNC: 14 U/L (ref 1–33)
AST SERPL-CCNC: 20 U/L (ref 1–32)
BILIRUB SERPL-MCNC: 0.3 MG/DL (ref 0–1.2)
BUN SERPL-MCNC: 7 MG/DL (ref 8–23)
BUN/CREAT SERPL: 7.9 (ref 7–25)
CALCIUM SERPL-MCNC: 9.1 MG/DL (ref 8.6–10.5)
CHLORIDE SERPL-SCNC: 104 MMOL/L (ref 98–107)
CHOLEST SERPL-MCNC: 160 MG/DL (ref 0–200)
CO2 SERPL-SCNC: 27.6 MMOL/L (ref 22–29)
CREAT SERPL-MCNC: 0.89 MG/DL (ref 0.57–1)
ERYTHROCYTE [DISTWIDTH] IN BLOOD BY AUTOMATED COUNT: 15 % (ref 12.3–15.4)
GLOBULIN SER CALC-MCNC: 2.6 GM/DL
GLUCOSE SERPL-MCNC: 154 MG/DL (ref 65–99)
HBA1C MFR BLD: 8.1 % (ref 4.8–5.6)
HCT VFR BLD AUTO: 41.3 % (ref 34–46.6)
HDLC SERPL-MCNC: 79 MG/DL (ref 40–60)
HGB BLD-MCNC: 13.3 G/DL (ref 12–15.9)
LDLC SERPL CALC-MCNC: 63 MG/DL (ref 0–100)
MCH RBC QN AUTO: 26.1 PG (ref 26.6–33)
MCHC RBC AUTO-ENTMCNC: 32.2 G/DL (ref 31.5–35.7)
MCV RBC AUTO: 81 FL (ref 79–97)
PLATELET # BLD AUTO: 336 10*3/MM3 (ref 140–450)
POTASSIUM SERPL-SCNC: 4.3 MMOL/L (ref 3.5–5.2)
PROT SERPL-MCNC: 6.8 G/DL (ref 6–8.5)
RBC # BLD AUTO: 5.1 10*6/MM3 (ref 3.77–5.28)
SODIUM SERPL-SCNC: 141 MMOL/L (ref 136–145)
TRIGL SERPL-MCNC: 99 MG/DL (ref 0–150)
TSH SERPL DL<=0.005 MIU/L-ACNC: 2.81 UIU/ML (ref 0.27–4.2)
VLDLC SERPL CALC-MCNC: 18 MG/DL (ref 5–40)
WBC # BLD AUTO: 8.71 10*3/MM3 (ref 3.4–10.8)

## 2020-12-10 RX ORDER — LOSARTAN POTASSIUM 100 MG/1
100 TABLET ORAL DAILY
Qty: 90 TABLET | Refills: 3 | Status: SHIPPED | OUTPATIENT
Start: 2020-12-10 | End: 2021-11-30

## 2020-12-10 RX ORDER — LEVOTHYROXINE SODIUM 0.05 MG/1
50 TABLET ORAL DAILY
Qty: 90 TABLET | Refills: 3 | Status: SHIPPED | OUTPATIENT
Start: 2020-12-10 | End: 2021-11-30

## 2020-12-10 RX ORDER — DONEPEZIL HYDROCHLORIDE 10 MG/1
10 TABLET, FILM COATED ORAL 2 TIMES DAILY
Qty: 180 TABLET | Refills: 3 | Status: SHIPPED | OUTPATIENT
Start: 2020-12-10 | End: 2021-05-18 | Stop reason: SDUPTHER

## 2021-01-11 DIAGNOSIS — E11.9 TYPE 2 DIABETES MELLITUS WITHOUT COMPLICATION, WITHOUT LONG-TERM CURRENT USE OF INSULIN (HCC): ICD-10-CM

## 2021-01-11 RX ORDER — LOVASTATIN 40 MG/1
40 TABLET ORAL NIGHTLY
Qty: 90 TABLET | Refills: 1 | Status: SHIPPED | OUTPATIENT
Start: 2021-01-11 | End: 2021-05-18 | Stop reason: SDUPTHER

## 2021-05-18 RX ORDER — DONEPEZIL HYDROCHLORIDE 10 MG/1
10 TABLET, FILM COATED ORAL 2 TIMES DAILY
Qty: 180 TABLET | Refills: 3 | Status: SHIPPED | OUTPATIENT
Start: 2021-05-18 | End: 2021-05-27

## 2021-05-19 RX ORDER — LOVASTATIN 40 MG/1
40 TABLET ORAL NIGHTLY
Qty: 90 TABLET | Refills: 1 | Status: SHIPPED | OUTPATIENT
Start: 2021-05-19

## 2021-05-27 ENCOUNTER — OFFICE VISIT (OUTPATIENT)
Dept: NEUROLOGY | Facility: CLINIC | Age: 66
End: 2021-05-27

## 2021-05-27 VITALS
DIASTOLIC BLOOD PRESSURE: 80 MMHG | WEIGHT: 206 LBS | BODY MASS INDEX: 32.33 KG/M2 | OXYGEN SATURATION: 96 % | HEART RATE: 68 BPM | HEIGHT: 67 IN | SYSTOLIC BLOOD PRESSURE: 130 MMHG | TEMPERATURE: 96.6 F

## 2021-05-27 DIAGNOSIS — G30.1 LATE ONSET ALZHEIMER'S DISEASE WITHOUT BEHAVIORAL DISTURBANCE (HCC): Primary | ICD-10-CM

## 2021-05-27 DIAGNOSIS — F02.80 LATE ONSET ALZHEIMER'S DISEASE WITHOUT BEHAVIORAL DISTURBANCE (HCC): Primary | ICD-10-CM

## 2021-05-27 PROCEDURE — 99214 OFFICE O/P EST MOD 30 MIN: CPT | Performed by: PHYSICIAN ASSISTANT

## 2021-05-27 RX ORDER — RIVASTIGMINE 13.3 MG/24H
1 PATCH, EXTENDED RELEASE TRANSDERMAL DAILY
Qty: 30 PATCH | Refills: 11 | Status: SHIPPED | OUTPATIENT
Start: 2021-05-27 | End: 2021-12-13 | Stop reason: SDUPTHER

## 2021-05-27 NOTE — PROGRESS NOTES
"Subjective     Chief Complaint: memory loss      History of Present Illness   Cassie Gomez is a 66 y.o. female who returns to clinic today with a history of Alzheimer's Disease. Her  has noted symptoms since approximately mid-2016 marked largely by forgetfulness. This has gradually worsened over time. Additional associated symptoms have included impairments in orientation and executive function.      Prior evaluation has included screening blood work and an MRI of the brain which were unremarkable. She is currently taking donepezil 10 mg BID and memantine.    Today: Since her last visit in 6/20, she feels essentially unchanged. Her family has noted continued cognitive decline. She is not having significant issues with depression or anxiety.       I have reviewed and confirmed the past family, social and medical history as accurate on 5/27/21.     Review of Systems   Constitutional: Negative.    HENT: Negative.    Eyes: Negative.    Respiratory: Negative.    Cardiovascular: Negative.    Gastrointestinal: Negative.    Endocrine: Negative.    Genitourinary: Negative.    Musculoskeletal: Negative.    Skin: Negative.    Allergic/Immunologic: Negative.    Hematological: Negative.        Objective     /80   Pulse 68   Temp 96.6 °F (35.9 °C)   Ht 170.2 cm (67\")   Wt 93.4 kg (206 lb)   SpO2 96%   BMI 32.26 kg/m²     General appearance today is normal.       Physical Exam  Neurological:      Coordination: Finger-Nose-Finger Test normal.      Deep Tendon Reflexes: Strength normal.   Psychiatric:         Speech: Speech normal.          Neurologic Exam     Mental Status   Speech: speech is normal   Level of consciousness: alert  Normal comprehension.     Cranial Nerves   Cranial nerves II through XII intact.     Motor Exam   Muscle bulk: normal  Overall muscle tone: normal    Strength   Strength 5/5 throughout.     Gait, Coordination, and Reflexes     Coordination   Finger to nose coordination: " normal    Tremor   Resting tremor: absent        Results  MMSE=untestable       Assessment/Plan   Diagnoses and all orders for this visit:    1. Late onset Alzheimer's disease without behavioral disturbance (CMS/HCC) (Primary)    Other orders  -     rivastigmine (EXELON) 13.3 MG/24HR patch; Place 1 patch on the skin as directed by provider Daily.  Dispense: 30 patch; Refill: 11          Discussion/Summary   Cassie Gomez returns to clinic today for evaluation of Alzheimer's Disease . I again reviewed her current status and treatment options. After discussing potential treatment options, it was elected to continue on her current medications unchanged. I recommended a referral to PT for her balance impairment, which they will consider in the future. She will then follow up in 6 months , or sooner if needed.   Total time of visit today: 30 minutes. As part of this visit I obtained additional history from the family which is incorporated in the HPI. I also discussed evaluation, current status, treatment options and management as discussed above.           Lavinia Randhawa PA-C

## 2021-06-02 ENCOUNTER — TELEPHONE (OUTPATIENT)
Dept: FAMILY MEDICINE CLINIC | Facility: CLINIC | Age: 66
End: 2021-06-02

## 2021-06-02 ENCOUNTER — OFFICE VISIT (OUTPATIENT)
Dept: FAMILY MEDICINE CLINIC | Facility: CLINIC | Age: 66
End: 2021-06-02

## 2021-06-02 VITALS
SYSTOLIC BLOOD PRESSURE: 120 MMHG | OXYGEN SATURATION: 100 % | WEIGHT: 208.8 LBS | HEART RATE: 62 BPM | DIASTOLIC BLOOD PRESSURE: 68 MMHG | HEIGHT: 67 IN | RESPIRATION RATE: 20 BRPM | BODY MASS INDEX: 32.77 KG/M2

## 2021-06-02 DIAGNOSIS — F32.A DEPRESSIVE DISORDER: ICD-10-CM

## 2021-06-02 DIAGNOSIS — K58.2 IRRITABLE BOWEL SYNDROME WITH BOTH CONSTIPATION AND DIARRHEA: ICD-10-CM

## 2021-06-02 DIAGNOSIS — G30.1 LATE ONSET ALZHEIMER'S DISEASE WITHOUT BEHAVIORAL DISTURBANCE (HCC): ICD-10-CM

## 2021-06-02 DIAGNOSIS — I10 ESSENTIAL HYPERTENSION: ICD-10-CM

## 2021-06-02 DIAGNOSIS — E78.2 MIXED HYPERLIPIDEMIA: ICD-10-CM

## 2021-06-02 DIAGNOSIS — E03.9 ACQUIRED HYPOTHYROIDISM: ICD-10-CM

## 2021-06-02 DIAGNOSIS — E11.9 TYPE 2 DIABETES MELLITUS WITHOUT COMPLICATION, WITHOUT LONG-TERM CURRENT USE OF INSULIN (HCC): ICD-10-CM

## 2021-06-02 DIAGNOSIS — Z12.31 ENCOUNTER FOR SCREENING MAMMOGRAM FOR MALIGNANT NEOPLASM OF BREAST: ICD-10-CM

## 2021-06-02 DIAGNOSIS — Z00.00 MEDICARE ANNUAL WELLNESS VISIT, SUBSEQUENT: Primary | ICD-10-CM

## 2021-06-02 DIAGNOSIS — F02.80 LATE ONSET ALZHEIMER'S DISEASE WITHOUT BEHAVIORAL DISTURBANCE (HCC): ICD-10-CM

## 2021-06-02 LAB — HBA1C MFR BLD: 7.2 %

## 2021-06-02 PROCEDURE — 36416 COLLJ CAPILLARY BLOOD SPEC: CPT | Performed by: FAMILY MEDICINE

## 2021-06-02 PROCEDURE — 83036 HEMOGLOBIN GLYCOSYLATED A1C: CPT | Performed by: FAMILY MEDICINE

## 2021-06-02 PROCEDURE — G0439 PPPS, SUBSEQ VISIT: HCPCS | Performed by: FAMILY MEDICINE

## 2021-06-02 NOTE — PATIENT INSTRUCTIONS
Preventive Care 65 Years and Older, Female  Preventive care refers to lifestyle choices and visits with your health care provider that can promote health and wellness. This includes:  · A yearly physical exam. This is also called an annual well check.  · Regular dental and eye exams.  · Immunizations.  · Screening for certain conditions.  · Healthy lifestyle choices, such as diet and exercise.  What can I expect for my preventive care visit?  Physical exam  Your health care provider will check:  · Height and weight. These may be used to calculate body mass index (BMI), which is a measurement that tells if you are at a healthy weight.  · Heart rate and blood pressure.  · Your skin for abnormal spots.  Counseling  Your health care provider may ask you questions about:  · Alcohol, tobacco, and drug use.  · Emotional well-being.  · Home and relationship well-being.  · Sexual activity.  · Eating habits.  · History of falls.  · Memory and ability to understand (cognition).  · Work and work environment.  · Pregnancy and menstrual history.  What immunizations do I need?    Influenza (flu) vaccine  · This is recommended every year.  Tetanus, diphtheria, and pertussis (Tdap) vaccine  · You may need a Td booster every 10 years.  Varicella (chickenpox) vaccine  · You may need this vaccine if you have not already been vaccinated.  Zoster (shingles) vaccine  · You may need this after age 60.  Pneumococcal conjugate (PCV13) vaccine  · One dose is recommended after age 65.  Pneumococcal polysaccharide (PPSV23) vaccine  · One dose is recommended after age 65.  Measles, mumps, and rubella (MMR) vaccine  · You may need at least one dose of MMR if you were born in 1957 or later. You may also need a second dose.  Meningococcal conjugate (MenACWY) vaccine  · You may need this if you have certain conditions.  Hepatitis A vaccine  · You may need this if you have certain conditions or if you travel or work in places where you may be exposed  to hepatitis A.  Hepatitis B vaccine  · You may need this if you have certain conditions or if you travel or work in places where you may be exposed to hepatitis B.  Haemophilus influenzae type b (Hib) vaccine  · You may need this if you have certain conditions.  You may receive vaccines as individual doses or as more than one vaccine together in one shot (combination vaccines). Talk with your health care provider about the risks and benefits of combination vaccines.  What tests do I need?  Blood tests  · Lipid and cholesterol levels. These may be checked every 5 years, or more frequently depending on your overall health.  · Hepatitis C test.  · Hepatitis B test.  Screening  · Lung cancer screening. You may have this screening every year starting at age 55 if you have a 30-pack-year history of smoking and currently smoke or have quit within the past 15 years.  · Colorectal cancer screening. All adults should have this screening starting at age 50 and continuing until age 75. Your health care provider may recommend screening at age 45 if you are at increased risk. You will have tests every 1-10 years, depending on your results and the type of screening test.  · Diabetes screening. This is done by checking your blood sugar (glucose) after you have not eaten for a while (fasting). You may have this done every 1-3 years.  · Mammogram. This may be done every 1-2 years. Talk with your health care provider about how often you should have regular mammograms.  · BRCA-related cancer screening. This may be done if you have a family history of breast, ovarian, tubal, or peritoneal cancers.  Other tests  · Sexually transmitted disease (STD) testing.  · Bone density scan. This is done to screen for osteoporosis. You may have this done starting at age 65.  Follow these instructions at home:  Eating and drinking  · Eat a diet that includes fresh fruits and vegetables, whole grains, lean protein, and low-fat dairy products. Limit  your intake of foods with high amounts of sugar, saturated fats, and salt.  · Take vitamin and mineral supplements as recommended by your health care provider.  · Do not drink alcohol if your health care provider tells you not to drink.  · If you drink alcohol:  ? Limit how much you have to 0-1 drink a day.  ? Be aware of how much alcohol is in your drink. In the [place] one drink equals one 12 oz bottle of beer (355 mL), one 5 oz glass of wine (148 mL), or one 1½ oz glass of hard liquor (44 mL).  Lifestyle  · Take daily care of your teeth and gums.  · Stay active. Exercise for at least 30 minutes on 5 or more days each week.  · Do not use any products that contain nicotine or tobacco, such as cigarettes, e-cigarettes, and chewing tobacco. If you need help quitting, ask your health care provider.  · If you are sexually active, practice safe sex. Use a condom or other form of protection in order to prevent STIs (sexually transmitted infections).  · Talk with your health care provider about taking a low-dose aspirin or statin.  What's next?  · Go to your health care provider once a year for a well check visit.  · Ask your health care provider how often you should have your eyes and teeth checked.  · Stay up to date on all vaccines.  This information is not intended to replace advice given to you by your health care provider. Make sure you discuss any questions you have with your health care provider.  Document Revised: 2019 Document Reviewed: 2019  [employer] Patient Education 2020 [employer]    Medicare Wellness  Personal Prevention Plan of Service     Date of Office Visit:  2021  Encounter Provider:  Shannan Braden MD  Place of Service:  [facility]  Patient Name: Cassie Vasquez Jason  :  1955    As part of the Medicare Wellness portion of your visit today, we are providing you with this personalized preventive plan of services (PPPS). This plan is based upon  recommendations of the [place] Preventive Services Task Force (USPSTF) and the Advisory Committee on Immunization Practices (ACIP).    This lists the preventive care services that should be considered, and provides dates of when you are due. Items listed as completed are up-to-date and do not require any further intervention.    Health Maintenance   Topic Date Due   • COVID-19 Vaccine (1) Never done   • TDAP/TD VACCINES (1 - Tdap) Never done   • ZOSTER VACCINE (2 of 3) 11/21/2017   • PAP SMEAR  03/18/2018   • URINE MICROALBUMIN  05/21/2020   • COLORECTAL CANCER SCREENING  01/12/2021   • DIABETIC EYE EXAM  03/02/2021   • ANNUAL WELLNESS VISIT  05/27/2021   • DIABETIC FOOT EXAM  05/27/2021   • HEMOGLOBIN A1C  06/01/2021   • INFLUENZA VACCINE  08/01/2021   • LIPID PANEL  12/01/2021   • MAMMOGRAM  01/31/2022   • DXA SCAN  07/01/2022   • HEPATITIS C SCREENING  Completed   • Pneumococcal Vaccine 65+  Completed       No orders of the defined types were placed in this encounter.      Return in about 6 months (around 12/2/2021).

## 2021-06-02 NOTE — PROGRESS NOTES
The ABCs of the Annual Wellness Visit  Subsequent Medicare Wellness Visit    Chief Complaint   Patient presents with   • Medicare Wellness-subsequent       Subjective   History of Present Illness:  Cassie Gomez is a 66 y.o. female who presents for a Subsequent Medicare Wellness Visit.    She has HTN which has been stable on cozaar. Denies side effects. No CP, palpitations, orthopnea, swelling.     She has HLP for which she is on statin. Tolerating well.     She has NIDDM which has been well controlled. Getting irregular exercise. Not recently following diabetic diet. She is not UTD on eye exam. tolerating metformin well. BG not being checked regularly as this causes her emotional distress.     She has IBS with mixed constipation and diarrhea. Denies recent flares. Denies abd pain or blood in stool.     Has acquired hypoTH. Taking replacement as directed. Some fatigue. Otherwise stable.     Has chronic depression as well as alzhemers dementia. followed by Dr. Jean-Baptiste. On zoloft, exelon patch, namenda. Taking as directed. Denies side effects. No recent falls or injuries.  has some trouble getting her to take meds on occasion. Using low dose melatonin qhs for sleep, this has decreased middle of night awakenings during which she has been confused.     Last colonoscopy in 2016, Dr. Soliz.  is concerned about distress that having f/u colonoscopy may cause.     She plans to obtain J&J COVID vaccination.    Pt's previous HPI reviewed and updated as indicated.     HEALTH RISK ASSESSMENT    Recent Hospitalizations:  No hospitalization(s) within the last year.    Current Medical Providers:  Patient Care Team:  Shannan Braden MD as PCP - General (Family Medicine)  Lew Jean-Baptiste MD as Consulting Physician (Neurology)  Judy Soliz MD as Surgeon (General Surgery)  Ching Prado MD as Consulting Physician (Obstetrics and Gynecology)    Smoking Status:  Social History     Tobacco Use    Smoking Status Never Smoker   Smokeless Tobacco Never Used       Alcohol Consumption:  Social History     Substance and Sexual Activity   Alcohol Use No       Depression Screen:   PHQ-2/PHQ-9 Depression Screening 6/2/2021   Little interest or pleasure in doing things 0   Feeling down, depressed, or hopeless 0   Total Score 0       Fall Risk Screen:  DANIELLE Fall Risk Assessment was completed, and patient is at HIGH risk for falls. Assessment completed on:5/27/2021    Health Habits and Functional and Cognitive Screening:  Functional & Cognitive Status 6/2/2021   Do you have difficulty preparing food and eating? Yes   Do you have difficulty bathing yourself, getting dressed or grooming yourself? No   Do you have difficulty using the toilet? No   Do you have difficulty moving around from place to place? No   Do you have trouble with steps or getting out of a bed or a chair? No   Current Diet Well Balanced Diet   Dental Exam Up to date   Eye Exam Up to date   Exercise (times per week) 0 times per week   Current Exercise Activities Include None   Do you need help using the phone?  Yes   Are you deaf or do you have serious difficulty hearing?  No   Do you need help with transportation? Yes   Do you need help shopping? Yes   Do you need help preparing meals?  Yes   Do you need help with housework?  Yes   Do you need help with laundry? Yes   Do you need help taking your medications? Yes   Do you need help managing money? Yes   Do you ever drive or ride in a car without wearing a seat belt? No   Have you felt unusual stress, anger or loneliness in the last month? -   Who do you live with? -   If you need help, do you have trouble finding someone available to you? -   Have you been bothered in the last four weeks by sexual problems? -   Do you have difficulty concentrating, remembering or making decisions? -         Does the patient have evidence of cognitive impairment? Yes    Asprin use counseling:Does not need ASA (and  currently is not on it)    Age-appropriate Screening Schedule:  Refer to the list below for future screening recommendations based on patient's age, sex and/or medical conditions. Orders for these recommended tests are listed in the plan section. The patient has been provided with a written plan.    Health Maintenance   Topic Date Due   • TDAP/TD VACCINES (1 - Tdap) Never done   • ZOSTER VACCINE (2 of 3) 11/21/2017   • PAP SMEAR  03/18/2018   • URINE MICROALBUMIN  05/21/2020   • DIABETIC EYE EXAM  03/02/2021   • DIABETIC FOOT EXAM  05/27/2021   • INFLUENZA VACCINE  08/01/2021   • LIPID PANEL  12/01/2021   • HEMOGLOBIN A1C  12/02/2021   • MAMMOGRAM  01/31/2022   • DXA SCAN  07/01/2022          The following portions of the patient's history were reviewed and updated as appropriate: allergies, current medications, past family history, past medical history, past social history, past surgical history and problem list.    Outpatient Medications Prior to Visit   Medication Sig Dispense Refill   • Cholecalciferol (VITAMIN D3) 5000 UNITS capsule capsule Take 5,000 Units by mouth Daily.     • Cyanocobalamin (VITAMIN B-12) 500 MCG sublingual tablet      • glucose blood test strip 1 each by Other route Daily. Use as instructed 100 each 1   • levothyroxine (SYNTHROID, LEVOTHROID) 50 MCG tablet Take 1 tablet by mouth Daily. 90 tablet 3   • losartan (COZAAR) 100 MG tablet Take 1 tablet by mouth Daily. 90 tablet 3   • lovastatin (MEVACOR) 40 MG tablet Take 1 tablet by mouth Every Night. 90 tablet 1   • memantine (NAMENDA) 10 MG tablet Take 1 tablet by mouth 2 (Two) Times a Day. 180 tablet 3   • metFORMIN (GLUCOPHAGE) 500 MG tablet Take 2 tablets by mouth 2 (Two) Times a Day With Meals. 360 tablet 1   • Multiple Vitamins-Minerals (PRESERVISION AREDS 2+MULTI VIT PO)      • rivastigmine (EXELON) 13.3 MG/24HR patch Place 1 patch on the skin as directed by provider Daily. 30 patch 11   • sertraline (ZOLOFT) 50 MG tablet Take 1 tablet  by mouth Daily. 90 tablet 3   • SF 5000 PLUS 1.1 % cream See Admin Instructions.       No facility-administered medications prior to visit.       Patient Active Problem List   Diagnosis   • Late onset Alzheimer's disease without behavioral disturbance (CMS/[facility])   • Type 2 diabetes mellitus without complication, without long-term current use of insulin (CMS/[facility])   • Acquired hypothyroidism   • Hyperlipidemia   • Irritable bowel syndrome with both constipation and diarrhea   • Essential hypertension   • Depressive disorder       Advanced Care Planning:  ACP discussion was held with the patient during this visit. Patient has an advance directive in EMR which is still valid.     Review of Systems   Constitutional: Negative for appetite change, fatigue, fever and unexpected weight change.   HENT: Negative for congestion, mouth sores, nosebleeds, rhinorrhea, sore throat and trouble swallowing.    Eyes: Negative for visual disturbance.   Respiratory: Negative for cough, shortness of breath and wheezing.    Cardiovascular: Negative for chest pain, palpitations and leg swelling.   Gastrointestinal: Negative for abdominal pain, blood in stool, constipation, diarrhea, nausea and vomiting.   Endocrine: Negative for heat intolerance, polydipsia, polyphagia and polyuria.   Genitourinary: Negative for dysuria, hematuria and vaginal bleeding.   Musculoskeletal: Negative for arthralgias and myalgias.   Skin: Negative for pallor, rash and wound.   Neurological: Negative for dizziness, tremors, seizures, syncope, speech difficulty, weakness and headaches.   Hematological: Negative for adenopathy. Does not bruise/bleed easily.   Psychiatric/Behavioral: Positive for confusion, decreased concentration and dysphoric mood. Negative for sleep disturbance and suicidal ideas. The patient is not nervous/anxious.         Decreased memory   Pt's previous ROS reviewed and updated as indicated.       Compared to one year ago, the patient feels her  "physical health is the same.  Compared to one year ago, the patient feels her mental health is worse.    Reviewed chart for potential of high risk medication in the elderly: yes  Reviewed chart for potential of harmful drug interactions in the elderly:yes    Objective         Vitals:    06/02/21 1014   BP: 120/68   Pulse: 62   Resp: 20   SpO2: 100%   Weight: 94.7 kg (208 lb 12.8 oz)   Height: 170.2 cm (67.01\")       Body mass index is 32.69 kg/m².  Discussed the patient's BMI with her. The BMI is above average; BMI management plan is completed.    Physical Exam  Vitals and nursing note reviewed.   Constitutional:       General: She is not in acute distress.     Appearance: She is well-developed and well-groomed. She is obese.      Comments: Appears older than stated age   HENT:      Head: Normocephalic and atraumatic.   Eyes:      General: No scleral icterus.     Extraocular Movements: Extraocular movements intact.      Conjunctiva/sclera: Conjunctivae normal.   Neck:      Thyroid: Thyromegaly (mild on right) present. No thyroid mass.      Vascular: Normal carotid pulses.   Cardiovascular:      Rate and Rhythm: Normal rate and regular rhythm.      Pulses: Normal pulses.      Heart sounds: Normal heart sounds.   Pulmonary:      Effort: Pulmonary effort is normal.      Breath sounds: Normal breath sounds and air entry.   Musculoskeletal:      Right lower leg: No edema.      Left lower leg: No edema.   Lymphadenopathy:      Cervical: No cervical adenopathy.   Skin:     General: Skin is warm and dry.      Coloration: Skin is not jaundiced or pale.      Findings: No bruising or rash.   Neurological:      Mental Status: She is alert. Mental status is at baseline.      Motor: Motor function is intact. No tremor.      Gait: Gait is intact.   Psychiatric:         Mood and Affect: Mood normal. Affect is labile (mildly).         Speech: Speech is delayed (mildly, trouble completing sentences).         Behavior: Behavior is " slowed (mildly, some difficulty following multi-step instructions). Behavior is cooperative.         Cognition and Memory: Cognition is impaired (mildly). Memory is impaired.     Pt's previous physical exam reviewed and updated as indicated.    Lab Results   Component Value Date    HGBA1C 7.2 06/02/2021    HGBA1C 8.10 (H) 12/01/2020    HGBA1C 7.4 08/31/2020     Lab Results   Component Value Date    MICROALBUR 7.6 02/05/2018    CREATININE 0.89 12/01/2020       Assessment/Plan   Medicare Risks and Personalized Health Plan  CMS Preventative Services Quick Reference  Advance Directive Discussion  Breast Cancer/Mammogram Screening  Cardiovascular risk  Dementia/Memory   Depression/Dysphoria  Diabetic Lab Screening   Fall Risk  Immunizations Discussed/Encouraged (specific immunizations; Tdap, Shingrix and COVID19 )  Inactivity/Sedentary  Obesity/Overweight   Osteoporosis Risk    The above risks/problems have been discussed with the patient.  Pertinent information has been shared with the patient in the After Visit Summary.  Follow up plans and orders are seen below in the Assessment/Plan Section.    Lab Results   Component Value Date    WBC 8.71 12/01/2020    HGB 13.3 12/01/2020    HCT 41.3 12/01/2020    MCV 81.0 12/01/2020     12/01/2020       Lab Results   Component Value Date    GLUCOSE 196 (H) 05/01/2020    BUN 7 (L) 12/01/2020    CREATININE 0.89 12/01/2020    EGFRIFNONA 63 12/01/2020    EGFRIFAFRI 77 12/01/2020    BCR 7.9 12/01/2020    K 4.3 12/01/2020    CO2 27.6 12/01/2020    CALCIUM 9.1 12/01/2020    PROTENTOTREF 6.8 12/01/2020    ALBUMIN 4.20 12/01/2020    LABIL2 1.6 12/01/2020    AST 20 12/01/2020    ALT 14 12/01/2020       Lab Results   Component Value Date    CHLPL 160 12/01/2020    TRIG 99 12/01/2020    HDL 79 (H) 12/01/2020    LDL 63 12/01/2020       Lab Results   Component Value Date    TSH 2.810 12/01/2020       Diagnoses and all orders for this visit:    1. Medicare annual wellness visit,  subsequent (Primary)  -     POC Glycosylated Hemoglobin (Hb A1C)    2. Essential hypertension    3. Mixed hyperlipidemia    4. Encounter for screening mammogram for malignant neoplasm of breast  -     Mammo Screening Digital Tomosynthesis Bilateral With CAD; Future    5. Acquired hypothyroidism    6. Type 2 diabetes mellitus without complication, without long-term current use of insulin (CMS/HCC)    7. Late onset Alzheimer's disease without behavioral disturbance (CMS/HCC)    8. Depressive disorder    9. Irritable bowel syndrome with both constipation and diarrhea      Chronic conditions appear stable.  Diabetic control improved, continue metformin, ARB, statin.    We will check with Dr. Soliz office regarding recommended follow-up from previous colonoscopy.    Follow Up:  Return in about 3 months (around 9/2/2021).   Follow-up sooner as needed.  Encouraged routine follow-up with neurology as scheduled.  She will schedule pelvic/breast exam at her earliest convenience.  Patient was encouraged to keep me informed of any acute changes, or any new concerning symptoms.  Pt is aware of reasons to seek emergent care.  Patient voiced understanding of all instructions and denied further questions.    An After Visit Summary and PPPS were given to the patient.    Please note that portions of this note may have been completed with a voice recognition program. Efforts were made to edit the dictations, but occasionally words are mistranscribed.

## 2021-06-02 NOTE — TELEPHONE ENCOUNTER
Please contact Dr. Soliz office and double check when she is due for next colon cancer screening. I believe last colonoscopy was in 2016 but pt could not recall results and when next was due.

## 2021-06-07 NOTE — TELEPHONE ENCOUNTER
Spoke to Melva at Dr. Soliz office, patient was last screened in 2016 and is due for another screening in 10 years.

## 2021-08-09 ENCOUNTER — TELEPHONE (OUTPATIENT)
Dept: NEUROLOGY | Facility: CLINIC | Age: 66
End: 2021-08-09
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
## 2021-08-09 NOTE — TELEPHONE ENCOUNTER
If he feels that she or her  are in danger, I would recommend being seen urgently. Otherwise, I would recommend adding Seroquel 25mg PRN. Just let me know what they would like to do. Hope this helps. Thanks

## 2021-08-10 RX ORDER — QUETIAPINE FUMARATE 25 MG/1
25 TABLET, FILM COATED ORAL NIGHTLY
Qty: 30 TABLET | Refills: 11 | Status: SHIPPED | OUTPATIENT
Start: 2021-08-10 | End: 2022-03-22

## 2021-08-10 NOTE — TELEPHONE ENCOUNTER
I spoke to son, who was advised of the medication. He voiced understanding. He is still concerned about the pt seeing her reflection and thinking she is talking to someone else. He stated that it is not only mirrors that she does it with, but anything that will show her reflection. Per Lavinia I told the pts son that he could cover up anything in the house that might cause a reflection for her, and that the Seroquel should help with that. He stated that he would like information in regards to someone coming into the home to help care for her along with her .

## 2021-08-10 NOTE — TELEPHONE ENCOUNTER
Pts  called and was advised of Lavinia's recommendation. He stated he would like the Rx would be sent in.

## 2021-08-12 DIAGNOSIS — F02.80 LATE ONSET ALZHEIMER'S DISEASE WITHOUT BEHAVIORAL DISTURBANCE (HCC): Primary | ICD-10-CM

## 2021-08-12 DIAGNOSIS — G30.1 LATE ONSET ALZHEIMER'S DISEASE WITHOUT BEHAVIORAL DISTURBANCE (HCC): Primary | ICD-10-CM

## 2021-08-12 NOTE — TELEPHONE ENCOUNTER
I have placed a referral to case management. Would you care to mail him a pathways book as well? Thanks

## 2021-08-13 ENCOUNTER — REFERRAL TRIAGE (OUTPATIENT)
Dept: CASE MANAGEMENT | Facility: OTHER | Age: 66
End: 2021-08-13

## 2021-09-02 ENCOUNTER — OFFICE VISIT (OUTPATIENT)
Dept: FAMILY MEDICINE CLINIC | Facility: CLINIC | Age: 66
End: 2021-09-02

## 2021-09-02 VITALS
BODY MASS INDEX: 34.03 KG/M2 | DIASTOLIC BLOOD PRESSURE: 74 MMHG | HEIGHT: 67 IN | HEART RATE: 74 BPM | OXYGEN SATURATION: 97 % | WEIGHT: 216.8 LBS | TEMPERATURE: 97.7 F | SYSTOLIC BLOOD PRESSURE: 122 MMHG

## 2021-09-02 DIAGNOSIS — E78.2 MIXED HYPERLIPIDEMIA: ICD-10-CM

## 2021-09-02 DIAGNOSIS — E11.9 TYPE 2 DIABETES MELLITUS WITHOUT COMPLICATION, WITHOUT LONG-TERM CURRENT USE OF INSULIN (HCC): ICD-10-CM

## 2021-09-02 DIAGNOSIS — G30.1 LATE ONSET ALZHEIMER'S DISEASE WITHOUT BEHAVIORAL DISTURBANCE (HCC): ICD-10-CM

## 2021-09-02 DIAGNOSIS — F02.80 LATE ONSET ALZHEIMER'S DISEASE WITHOUT BEHAVIORAL DISTURBANCE (HCC): ICD-10-CM

## 2021-09-02 DIAGNOSIS — F32.A DEPRESSIVE DISORDER: ICD-10-CM

## 2021-09-02 DIAGNOSIS — E03.9 ACQUIRED HYPOTHYROIDISM: ICD-10-CM

## 2021-09-02 DIAGNOSIS — I10 ESSENTIAL HYPERTENSION: Primary | ICD-10-CM

## 2021-09-02 PROCEDURE — 99214 OFFICE O/P EST MOD 30 MIN: CPT | Performed by: FAMILY MEDICINE

## 2021-09-02 RX ORDER — DONEPEZIL HYDROCHLORIDE 10 MG/1
10 TABLET, FILM COATED ORAL 2 TIMES DAILY
COMMUNITY
Start: 2021-06-12 | End: 2021-09-02

## 2021-09-02 NOTE — PROGRESS NOTES
"Subjective   Cassie Gomez is a 66 y.o. female.     History of Present Illness  Mrs. Gomez presented with her  for breast exam, cervical cancer screening.  Due to her agitated state (has late onset Alzheimer's) she was not able to tolerate preparation for exam.  Discussed options with her , he declined attempting to proceed and requested we simply have a \"routine follow-up\".    Ms. Gomez has hypertension, dyslipidemia, hypothyroidism, type 2 diabetes as well as depression in the setting of Alzheimer's dementia.  She is followed by neurology.  On Zoloft, Exelon and Namenda.  Seroquel recently added due to increased depressed mood, nighttime agitation, combativeness.  Taking her thyroid replacement as prescribed.  On statin, aspirin, losartan, Metformin for management of chronic disease.  For now doing well with taking medication although has been states it is becoming more of a challenge.    Otherwise no new complaints today.    The following portions of the patient's history were reviewed and updated as appropriate: allergies, current medications, past family history, past medical history, past social history, past surgical history and problem list.    Review of Systems   Constitutional: Negative for appetite change, fatigue, fever and unexpected weight change.   HENT: Negative for congestion, mouth sores, nosebleeds, rhinorrhea, sore throat and trouble swallowing.    Eyes: Negative for visual disturbance.   Respiratory: Negative for cough, shortness of breath and wheezing.    Cardiovascular: Negative for chest pain, palpitations and leg swelling.   Gastrointestinal: Negative for abdominal pain, blood in stool, constipation, diarrhea, nausea and vomiting.   Endocrine: Negative for heat intolerance, polydipsia, polyphagia and polyuria.   Genitourinary: Negative for dysuria, hematuria and vaginal bleeding.   Musculoskeletal: Negative for arthralgias and myalgias.   Skin: Negative for pallor, rash and " wound.   Neurological: Negative for dizziness, tremors, seizures, syncope, speech difficulty, weakness and headaches.   Hematological: Negative for adenopathy. Does not bruise/bleed easily.   Psychiatric/Behavioral: Positive for agitation, behavioral problems, confusion, decreased concentration and dysphoric mood. Negative for sleep disturbance and suicidal ideas. The patient is not nervous/anxious.         Decreased memory   Pt's previous ROS reviewed and updated as indicated.       Objective    Vitals:    09/02/21 1039   BP: 122/74   Pulse: 74   Temp: 97.7 °F (36.5 °C)   SpO2: 97%     Body mass index is 33.96 kg/m².      09/02/21  1039   Weight: 98.3 kg (216 lb 12.8 oz)         Physical Exam  Vitals and nursing note reviewed.   Constitutional:       General: She is not in acute distress.     Appearance: She is well-developed and well-groomed. She is obese.      Comments: Appears older than stated age   HENT:      Head: Normocephalic and atraumatic.   Eyes:      General: No scleral icterus.     Extraocular Movements: Extraocular movements intact.      Conjunctiva/sclera: Conjunctivae normal.   Neck:      Thyroid: Thyromegaly (mild on right) present. No thyroid mass.      Vascular: Normal carotid pulses.   Cardiovascular:      Rate and Rhythm: Normal rate and regular rhythm.      Pulses: Normal pulses.      Heart sounds: Normal heart sounds.   Pulmonary:      Effort: Pulmonary effort is normal.      Breath sounds: Normal breath sounds and air entry.   Musculoskeletal:      Right lower leg: No edema.      Left lower leg: No edema.   Lymphadenopathy:      Cervical: No cervical adenopathy.   Skin:     General: Skin is warm and dry.      Coloration: Skin is not jaundiced or pale.      Findings: No bruising or rash.   Neurological:      Mental Status: She is alert. Mental status is at baseline.      Motor: Motor function is intact. No tremor.      Gait: Gait is intact.   Psychiatric:         Mood and Affect: Mood normal.  Affect is labile (mildly).         Speech: Speech is delayed (mildly, trouble completing sentences).         Behavior: Behavior is agitated (mildly) and slowed (mildly, some difficulty following multi-step instructions). Behavior is cooperative.         Cognition and Memory: Cognition is impaired. Memory is impaired.       Lab Results   Component Value Date    WBC 8.71 12/01/2020    HGB 13.3 12/01/2020    HCT 41.3 12/01/2020    MCV 81.0 12/01/2020     12/01/2020       Lab Results   Component Value Date    GLUCOSE 196 (H) 05/01/2020    BUN 7 (L) 12/01/2020    CREATININE 0.89 12/01/2020    EGFRIFNONA 63 12/01/2020    EGFRIFAFRI 77 12/01/2020    BCR 7.9 12/01/2020    K 4.3 12/01/2020    CO2 27.6 12/01/2020    CALCIUM 9.1 12/01/2020    PROTENTOTREF 6.8 12/01/2020    ALBUMIN 4.20 12/01/2020    LABIL2 1.6 12/01/2020    AST 20 12/01/2020    ALT 14 12/01/2020       Lab Results   Component Value Date    CHLPL 160 12/01/2020    TRIG 99 12/01/2020    HDL 79 (H) 12/01/2020    LDL 63 12/01/2020       Lab Results   Component Value Date    HGBA1C 7.2 06/02/2021       Lab Results   Component Value Date    TSH 2.810 12/01/2020           Assessment/Plan   Diagnoses and all orders for this visit:    1. Essential hypertension (Primary)    2. Mixed hyperlipidemia    3. Acquired hypothyroidism    4. Type 2 diabetes mellitus without complication, without long-term current use of insulin (CMS/[facility])    5. Depressive disorder    6. Late onset Alzheimer's disease without behavioral disturbance (CMS/[facility])       Hypertension controlled, continue losartan.  Mixed hyperlipidemia good tolerance of statin.  Continue Mevacor  Generally appears euthyroid.  Continue current dose of levothyroxine.    NIDDM controlled continue Metformin, statin, aspirin, ARB  Depression in setting of Alzheimer's dementia.  Continue Zoloft, Namenda, Exelon, follow-up with neurology as scheduled.    Routine follow-up in 6 months, sooner as needed.   was  encouraged to keep me informed of any acute changes or any new concerning symptoms.   is aware of reasons to seek emergent care.   voiced understanding of all instructions and denied further questions.    Please note that portions of this note may have been completed with a voice recognition program. Efforts were made to edit the dictations, but occasionally words are mistranscribed.

## 2021-09-04 PROBLEM — E78.2 MIXED HYPERLIPIDEMIA: Status: ACTIVE | Noted: 2017-07-06

## 2021-10-08 ENCOUNTER — TELEPHONE (OUTPATIENT)
Dept: NEUROLOGY | Facility: CLINIC | Age: 66
End: 2021-10-08

## 2021-11-15 ENCOUNTER — DOCUMENTATION (OUTPATIENT)
Dept: NEUROLOGY | Facility: CLINIC | Age: 66
End: 2021-11-15

## 2021-11-15 ENCOUNTER — PATIENT OUTREACH (OUTPATIENT)
Dept: CASE MANAGEMENT | Facility: OTHER | Age: 66
End: 2021-11-15

## 2021-11-15 ENCOUNTER — REFERRAL TRIAGE (OUTPATIENT)
Dept: CASE MANAGEMENT | Facility: OTHER | Age: 66
End: 2021-11-15

## 2021-11-15 DIAGNOSIS — F02.80 LATE ONSET ALZHEIMER'S DISEASE WITHOUT BEHAVIORAL DISTURBANCE (HCC): Primary | ICD-10-CM

## 2021-11-15 DIAGNOSIS — G30.1 LATE ONSET ALZHEIMER'S DISEASE WITHOUT BEHAVIORAL DISTURBANCE (HCC): Primary | ICD-10-CM

## 2021-11-15 NOTE — TELEPHONE ENCOUNTER
Caller: Cassie Gomez RENETTA, PT'S SON  Relationship: Self    Best call back number: [phone]  IF NO ANSWER, PLEASE LEAVE MSG    Who are you requesting to speak with (clinical staff, provider,  specific staff member):   ARUN MACKENZIE OR A     What was the call regarding:   THE PT'S SON IS TRYING TO GET INFORMATION FOR HOME HEALTH CARE OR RESPITE CARE.    THE PT'S  WAS HELPING WITH THE PT'S CARE BUT HIS HEALTH HAS LIMITED HIS ABILITY TO ASSIST.    Do you require a callback: YES, PLEASE.

## 2021-11-15 NOTE — TELEPHONE ENCOUNTER
I have placed a referral to our case management depart for someone to contact him regarding potential resources and support options as they do now accept our referrals if their PCP is in [place]. Hope that helps. Thanks.

## 2021-11-15 NOTE — OUTREACH NOTE
ASSESSMENT    Staff Spoke With: pt's son, Juan Carlos and his wife    Reason for the Referral: Rehab/Nursing Home Placement    Patient's Reported Cognitive Status: Confused    Does the patient have Advanced Care Planning documents?: Yes, POA    Patient's Reported Physical Status: Needs Assistance    Patient utilizes these assistive devices and/or in home care services: Wheelchair    Patient's Current Living Arrangements (Home Environment, Caregiver Support, Others in Home): pt resides with her  in their home     Patient's  Status: Spouse served in the , not the patient.    Patient's Employment Status: pt receives social security     DISCUSSION AND PLAN    Juan Carlos reported that he is interested in home care services for his mother. SW provided Juan Carlos the phone number for Medicaid Waiver services. Juan Carlos also reported that pt would benefit from a shower chair. SW will send provider a message to order the item.     An SDOH was completed and no other resources were requested at this time.     SDOH updated and reviewed with the patient during this program:     Financial Resource Strain: Low Risk    • Difficulty of Paying Living Expenses: Not very hard       Food Insecurity: No Food Insecurity   • Worried About Running Out of Food in the Last Year: Never true   • Ran Out of Food in the Last Year: Never true       Transportation Needs: No Transportation Needs   • Lack of Transportation (Medical): No   • Lack of Transportation (Non-Medical): No       Housing Stability: Unknown   • Unable to Pay for Housing in the Last Year: No   • Number of Places Lived in the Last Year: Not on file   • Unstable Housing in the Last Year: No     SW will remain involved in this case to support family and to be available for additional questions regarding Medicaid Waiver.     Care Coordination    SW reached out to VALENTINA Vasquez to request an order be placed for a shower chair.     BENJI Barba   , Ambulatory Case  Management    11/15/2021 16:31 EST

## 2021-11-16 NOTE — TELEPHONE ENCOUNTER
Called and spoke to pts son. He voiced understanding and stated that he had been in contact with Mitali.

## 2021-11-17 ENCOUNTER — TELEPHONE (OUTPATIENT)
Dept: NEUROLOGY | Facility: CLINIC | Age: 66
End: 2021-11-17

## 2021-11-17 NOTE — TELEPHONE ENCOUNTER
He asked case management that I write for one, so maybe just double check. That was earlier this week. Thanks

## 2021-11-17 NOTE — TELEPHONE ENCOUNTER
When I spoke to pt last week, he stated that he did not need the shower chair since his dad has one she can use.

## 2021-11-19 ENCOUNTER — PATIENT OUTREACH (OUTPATIENT)
Dept: CASE MANAGEMENT | Facility: OTHER | Age: 66
End: 2021-11-19

## 2021-11-19 NOTE — OUTREACH NOTE
Patient Outreach    PATY reached out to pt's son, Juan Carlos to follow up on Medicaid Waiver application. Juan Carlos reported that he has called Vicky with the Medicaid Waiver program but she has not returned his call yet. PATY told Juan Carlos that she can send Vicky and email to let her know that he is trying to reach her.   Juan Carlos also reported that he spoke to his dad and found out that his mom and dad already have a shower chair so there is no need to order one now.     PATY will continue to work with family and be available in case there are additional questions with the Medicaid Waiver program      Care Coordination    PATY sent message to Vicky and requested that she contact pt's son Juan Carlos in regards to application. Vicky replied and reported that she will contact Juan Carlos today and will handle the case from here.     PATY will close case.     BENJI Barba   , Ambulatory Case Management    11/19/2021 13:39 EST

## 2021-11-30 RX ORDER — LEVOTHYROXINE SODIUM 50 UG/1
TABLET ORAL
Qty: 90 TABLET | Refills: 0 | Status: SHIPPED | OUTPATIENT
Start: 2021-11-30 | End: 2022-03-13 | Stop reason: SDUPTHER

## 2021-11-30 RX ORDER — LOSARTAN POTASSIUM 100 MG/1
TABLET ORAL
Qty: 90 TABLET | Refills: 0 | Status: SHIPPED | OUTPATIENT
Start: 2021-11-30 | End: 2022-03-06 | Stop reason: SDUPTHER

## 2021-12-03 ENCOUNTER — OFFICE VISIT (OUTPATIENT)
Dept: NEUROLOGY | Facility: CLINIC | Age: 66
End: 2021-12-03

## 2021-12-03 VITALS — WEIGHT: 216 LBS | BODY MASS INDEX: 33.9 KG/M2 | HEIGHT: 67 IN | HEART RATE: 72 BPM | OXYGEN SATURATION: 99 %

## 2021-12-03 DIAGNOSIS — F02.80 LATE ONSET ALZHEIMER'S DISEASE WITHOUT BEHAVIORAL DISTURBANCE (HCC): Primary | ICD-10-CM

## 2021-12-03 DIAGNOSIS — G30.1 LATE ONSET ALZHEIMER'S DISEASE WITHOUT BEHAVIORAL DISTURBANCE (HCC): Primary | ICD-10-CM

## 2021-12-03 PROCEDURE — 99214 OFFICE O/P EST MOD 30 MIN: CPT | Performed by: PHYSICIAN ASSISTANT

## 2021-12-03 RX ORDER — MEMANTINE HYDROCHLORIDE 10 MG/1
10 TABLET ORAL 2 TIMES DAILY
Qty: 60 TABLET | Refills: 11 | Status: SHIPPED | OUTPATIENT
Start: 2021-12-03 | End: 2022-03-13 | Stop reason: SDUPTHER

## 2021-12-03 NOTE — PROGRESS NOTES
"Subjective       Chief Complaint: memory loss      History of Present Illness   Cassie Gomez is a 66 y.o. female who returns to clinic today with a history of Alzheimer's Disease. Her  has noted symptoms since approximately mid-2016 marked largely by forgetfulness. This has gradually worsened over time. Additional associated symptoms have included impairments in orientation and executive function.      Prior evaluation has included screening blood work and an MRI of the brain which were unremarkable. She is currently taking rivastigmine patch 13.3mg daily. She is no longer taking memantine.    Today: Since her last visit in 5/21, her family has noted a continued cognitive decline. Her anxiety and agitation have improved with the initiation of seroquel 25mg nightly.        I have reviewed and confirmed the past family, social and medical history as accurate on 12/3/21.     Review of Systems   Constitutional: Negative.    HENT: Negative.    Eyes: Negative.    Respiratory: Negative.    Cardiovascular: Negative.    Gastrointestinal: Negative.    Endocrine: Negative.    Genitourinary: Negative.    Musculoskeletal: Negative.    Skin: Negative.    Allergic/Immunologic: Negative.    Hematological: Negative.        Objective     Pulse 72   Ht 170.2 cm (67\")   Wt 98 kg (216 lb)   SpO2 99%   BMI 33.83 kg/m²     General appearance today is normal.       Physical Exam  Neurological:      Coordination: Finger-Nose-Finger Test normal.      Gait: Gait is intact.      Deep Tendon Reflexes: Strength normal.   Psychiatric:         Speech: Speech normal.          Neurologic Exam     Mental Status   Oriented to person.   Disoriented to place.   Disoriented to time.   Follows 1 step commands.   Attention: decreased.   Speech: speech is normal   Level of consciousness: alert  Normal comprehension.     Cranial Nerves   Cranial nerves II through XII intact.     Motor Exam   Muscle bulk: normal  Overall muscle tone: " normal    Strength   Strength 5/5 throughout.     Gait, Coordination, and Reflexes     Gait  Gait: normal    Coordination   Finger to nose coordination: normal    Tremor   Resting tremor: absent        Results  MMSE=untestable       Assessment/Plan   Diagnoses and all orders for this visit:    1. Late onset Alzheimer's disease without behavioral disturbance (HCC) (Primary)    Other orders  -     memantine (NAMENDA) 10 MG tablet; Take 1 tablet by mouth 2 (Two) Times a Day.  Dispense: 60 tablet; Refill: 11          Discussion/Summary   Cassie Gomez returns to clinic today for evaluation of Alzheimer's Disease . I again reviewed her current status and treatment options. After discussing potential treatment options, it was elected to restart memantine and continue on rivastigmine patch and Seroquel unchanged. She will then follow up in 6 months , or sooner if needed.   Total time of visit today: 40 minutes. As part of this visit I obtained additional history from the family which is incorporated in the HPI. I also discussed diagnosis, evaluation, current status, treatment options and management as discussed above.             Lavinia Randhawa PA-C

## 2021-12-06 NOTE — TELEPHONE ENCOUNTER
Last refill was sent in on 05/18/2021 with a 90 day supply and 3 refills. Pt has refills at pharmacy.

## 2021-12-07 RX ORDER — RIVASTIGMINE 13.3 MG/24H
1 PATCH, EXTENDED RELEASE TRANSDERMAL DAILY
Qty: 30 PATCH | Refills: 11 | OUTPATIENT
Start: 2021-12-07

## 2021-12-14 RX ORDER — RIVASTIGMINE 13.3 MG/24H
1 PATCH, EXTENDED RELEASE TRANSDERMAL DAILY
Qty: 30 PATCH | Refills: 11 | Status: SHIPPED | OUTPATIENT
Start: 2021-12-14 | End: 2023-01-03

## 2022-03-02 ENCOUNTER — OFFICE VISIT (OUTPATIENT)
Dept: FAMILY MEDICINE CLINIC | Facility: CLINIC | Age: 67
End: 2022-03-02

## 2022-03-02 VITALS
DIASTOLIC BLOOD PRESSURE: 78 MMHG | WEIGHT: 219.6 LBS | SYSTOLIC BLOOD PRESSURE: 122 MMHG | BODY MASS INDEX: 34.47 KG/M2 | HEART RATE: 68 BPM | OXYGEN SATURATION: 90 % | HEIGHT: 67 IN

## 2022-03-02 DIAGNOSIS — E11.9 TYPE 2 DIABETES MELLITUS WITHOUT COMPLICATION, WITHOUT LONG-TERM CURRENT USE OF INSULIN: ICD-10-CM

## 2022-03-02 DIAGNOSIS — G30.1 LATE ONSET ALZHEIMER'S DISEASE WITHOUT BEHAVIORAL DISTURBANCE: ICD-10-CM

## 2022-03-02 DIAGNOSIS — Z23 NEED FOR COVID-19 VACCINE: ICD-10-CM

## 2022-03-02 DIAGNOSIS — E78.2 MIXED HYPERLIPIDEMIA: ICD-10-CM

## 2022-03-02 DIAGNOSIS — F02.80 LATE ONSET ALZHEIMER'S DISEASE WITHOUT BEHAVIORAL DISTURBANCE: ICD-10-CM

## 2022-03-02 DIAGNOSIS — I10 ESSENTIAL HYPERTENSION: Primary | ICD-10-CM

## 2022-03-02 DIAGNOSIS — K58.2 IRRITABLE BOWEL SYNDROME WITH BOTH CONSTIPATION AND DIARRHEA: ICD-10-CM

## 2022-03-02 DIAGNOSIS — E55.9 VITAMIN D DEFICIENCY: ICD-10-CM

## 2022-03-02 DIAGNOSIS — E03.9 ACQUIRED HYPOTHYROIDISM: ICD-10-CM

## 2022-03-02 PROCEDURE — 91305 COVID-19 (PFIZER) 12+ YRS: CPT | Performed by: FAMILY MEDICINE

## 2022-03-02 PROCEDURE — 99214 OFFICE O/P EST MOD 30 MIN: CPT | Performed by: FAMILY MEDICINE

## 2022-03-02 PROCEDURE — 0051A COVID-19 (PFIZER) 12+ YRS: CPT | Performed by: FAMILY MEDICINE

## 2022-03-02 NOTE — PROGRESS NOTES
Subjective   Cassie Gomez is a 67 y.o. female.     History of Present Illness   Mrs. Gomez presents today with her  for routine f/u on several chronic med problems.    She has HTN which has been stable on cozaar. Denies side effects. No CP, palpitations, orthopnea, swelling.     She has HLP for which she is on statin. Tolerating well.     She has NIDDM which has been fairly well controlled. Getting irregular exercise. Not recently following diabetic diet. tolerating metformin well. BG not being checked regularly as this causes her emotional distress.     She has IBS with mixed constipation and diarrhea. Denies recent flares. Denies abd pain or blood in stool.     Has acquired hypoTH. Taking replacement as directed. Some fatigue. Otherwise stable.     Has chronic depression as well as alzhemers dementia. followed by [facility]. On zoloft, exelon patch, namenda. Taking as directed. Denies side effects. No recent falls or injuries.  has some trouble getting her to take meds on occasion. Using low dose melatonin qhs for sleep, this has decreased middle of night awakenings during which she has been confused. He feels her symptoms are progressing.    Otherwise no new concerns today.     Pt's previous HPI reviewed and updated as indicated.     The following portions of the patient's history were reviewed and updated as appropriate: allergies, current medications, past family history, past medical history, past social history, past surgical history and problem list.    Review of Systems   Constitutional: Negative for appetite change, fatigue, fever and unexpected weight change.   HENT: Negative for congestion, mouth sores, nosebleeds, rhinorrhea, sore throat and trouble swallowing.    Eyes: Negative for visual disturbance.   Respiratory: Negative for cough, shortness of breath and wheezing.    Cardiovascular: Negative for chest pain, palpitations and leg swelling.   Gastrointestinal: Negative for  abdominal pain, blood in stool, constipation, diarrhea, nausea and vomiting.   Endocrine: Negative for heat intolerance, polydipsia, polyphagia and polyuria.   Genitourinary: Negative for dysuria, hematuria and vaginal bleeding.   Musculoskeletal: Negative for arthralgias and myalgias.   Skin: Negative for pallor, rash and wound.   Neurological: Negative for dizziness, tremors, seizures, syncope, speech difficulty, weakness and headaches.   Hematological: Negative for adenopathy. Does not bruise/bleed easily.   Psychiatric/Behavioral: Positive for agitation, behavioral problems, confusion, decreased concentration and dysphoric mood. Negative for sleep disturbance and suicidal ideas. The patient is not nervous/anxious.         Decreased memory   Pt's previous ROS reviewed and updated as indicated.       Objective    Vitals:    03/02/22 1253   BP: 122/78   Pulse: 68   SpO2: 90%     Body mass index is 34.39 kg/m².      03/02/22  1253   Weight: 99.6 kg (219 lb 9.6 oz)       Physical Exam  Vitals and nursing note reviewed.   Constitutional:       General: She is not in acute distress.     Appearance: She is well-developed and well-groomed. She is obese.      Comments: Appears older than stated age   HENT:      Head: Normocephalic and atraumatic.   Eyes:      General: No scleral icterus.     Extraocular Movements: Extraocular movements intact.      Conjunctiva/sclera: Conjunctivae normal.   Neck:      Thyroid: Thyromegaly (mild on right) present. No thyroid mass.      Vascular: Normal carotid pulses.   Cardiovascular:      Rate and Rhythm: Normal rate and regular rhythm.      Pulses: Normal pulses.      Heart sounds: Normal heart sounds.   Pulmonary:      Effort: Pulmonary effort is normal.      Breath sounds: Normal breath sounds and air entry.   Musculoskeletal:      Right lower leg: No edema.      Left lower leg: No edema.   Lymphadenopathy:      Cervical: No cervical adenopathy.   Skin:     General: Skin is warm and  dry.      Coloration: Skin is not jaundiced or pale.      Findings: No bruising or rash.   Neurological:      Mental Status: She is alert. Mental status is at baseline.      Motor: Motor function is intact. No tremor.      Gait: Gait is intact.   Psychiatric:         Mood and Affect: Mood normal. Affect is labile (mildly).         Speech: Speech is delayed (mildly, trouble completing sentences).         Behavior: Behavior is agitated (mildly) and slowed (mildly, some difficulty following multi-step instructions). Behavior is cooperative.         Cognition and Memory: Cognition is impaired. Memory is impaired.     Pt's previous physical exam reviewed and updated as indicated.    Assessment/Plan   Diagnoses and all orders for this visit:    1. Essential hypertension (Primary)  -     CBC & Differential  -     Comprehensive Metabolic Panel    2. Mixed hyperlipidemia  -     Comprehensive Metabolic Panel    3. Type 2 diabetes mellitus without complication, without long-term current use of insulin (HCC)  -     Comprehensive Metabolic Panel  -     Hemoglobin A1c    4. Acquired hypothyroidism  -     TSH Rfx On Abnormal To Free T4    5. Irritable bowel syndrome with both constipation and diarrhea    6. Late onset Alzheimer's disease without behavioral disturbance (HCC)    7. Need for COVID-19 vaccine  -     COVID-19 Vaccine (Pfizer) Gray Cap    8. Vitamin D deficiency  -     Vitamin D 25 Hydroxy       HTN conrolled, continue losartan.  HLP with good tolerance of statin.  NIDDM fairly well controlled. Continue metformin. Recheck A1c, adjust tx as indicated.  Generally appearing euthyroid. Recheck TSh and ajust replacement as indicated.  IBS stable.  Alzhemier's dementia slowly progressing. Continue current meds, f/u with neuro as scheduled.  Assess status of vit/min deficiencies and replace as indicated.    Routine f/u in 6 months, sooner as needed/instructed.  I will contact patient regarding test results and provide  instructions regarding any necessary changes in plan of care.  Patient/ encouraged to keep me informed of any acute changes, lack of improvement, or any new concerning symptoms.  Pt/ aware of reasons to seek emergent care.  Patient/ voiced understanding of all instructions and denied further questions.    Please note that portions of this note may have been completed with a voice recognition program. Efforts were made to edit the dictations, but occasionally words are mistranscribed.

## 2022-03-03 LAB
25(OH)D3+25(OH)D2 SERPL-MCNC: 52.8 NG/ML (ref 30–100)
ALBUMIN SERPL-MCNC: 4.5 G/DL (ref 3.5–5.2)
ALBUMIN/GLOB SERPL: 1.5 G/DL
ALP SERPL-CCNC: 105 U/L (ref 39–117)
ALT SERPL-CCNC: 12 U/L (ref 1–33)
AST SERPL-CCNC: 22 U/L (ref 1–32)
BASOPHILS # BLD AUTO: 0.09 10*3/MM3 (ref 0–0.2)
BASOPHILS NFR BLD AUTO: 1 % (ref 0–1.5)
BILIRUB SERPL-MCNC: 0.2 MG/DL (ref 0–1.2)
BUN SERPL-MCNC: 10 MG/DL (ref 8–23)
BUN/CREAT SERPL: 9.9 (ref 7–25)
CALCIUM SERPL-MCNC: 10.3 MG/DL (ref 8.6–10.5)
CHLORIDE SERPL-SCNC: 101 MMOL/L (ref 98–107)
CO2 SERPL-SCNC: 25.4 MMOL/L (ref 22–29)
CREAT SERPL-MCNC: 1.01 MG/DL (ref 0.57–1)
EGFR GENE MUT ANL BLD/T: 60.8 ML/MIN/1.73
EOSINOPHIL # BLD AUTO: 0.17 10*3/MM3 (ref 0–0.4)
EOSINOPHIL NFR BLD AUTO: 1.9 % (ref 0.3–6.2)
ERYTHROCYTE [DISTWIDTH] IN BLOOD BY AUTOMATED COUNT: 13.8 % (ref 12.3–15.4)
GLOBULIN SER CALC-MCNC: 3.1 GM/DL
GLUCOSE SERPL-MCNC: 337 MG/DL (ref 65–99)
HBA1C MFR BLD: 9.7 % (ref 4.8–5.6)
HCT VFR BLD AUTO: 43.2 % (ref 34–46.6)
HGB BLD-MCNC: 13.7 G/DL (ref 12–15.9)
IMM GRANULOCYTES # BLD AUTO: 0.03 10*3/MM3 (ref 0–0.05)
IMM GRANULOCYTES NFR BLD AUTO: 0.3 % (ref 0–0.5)
LYMPHOCYTES # BLD AUTO: 2.31 10*3/MM3 (ref 0.7–3.1)
LYMPHOCYTES NFR BLD AUTO: 25.7 % (ref 19.6–45.3)
MCH RBC QN AUTO: 27.7 PG (ref 26.6–33)
MCHC RBC AUTO-ENTMCNC: 31.7 G/DL (ref 31.5–35.7)
MCV RBC AUTO: 87.4 FL (ref 79–97)
MONOCYTES # BLD AUTO: 0.57 10*3/MM3 (ref 0.1–0.9)
MONOCYTES NFR BLD AUTO: 6.3 % (ref 5–12)
NEUTROPHILS # BLD AUTO: 5.81 10*3/MM3 (ref 1.7–7)
NEUTROPHILS NFR BLD AUTO: 64.8 % (ref 42.7–76)
NRBC BLD AUTO-RTO: 0 /100 WBC (ref 0–0.2)
PLATELET # BLD AUTO: 367 10*3/MM3 (ref 140–450)
POTASSIUM SERPL-SCNC: 4.3 MMOL/L (ref 3.5–5.2)
PROT SERPL-MCNC: 7.6 G/DL (ref 6–8.5)
RBC # BLD AUTO: 4.94 10*6/MM3 (ref 3.77–5.28)
SODIUM SERPL-SCNC: 139 MMOL/L (ref 136–145)
TSH SERPL DL<=0.005 MIU/L-ACNC: 1.3 UIU/ML (ref 0.27–4.2)
WBC # BLD AUTO: 8.98 10*3/MM3 (ref 3.4–10.8)

## 2022-03-04 ENCOUNTER — TELEPHONE (OUTPATIENT)
Dept: FAMILY MEDICINE CLINIC | Facility: CLINIC | Age: 67
End: 2022-03-04

## 2022-03-04 NOTE — TELEPHONE ENCOUNTER
----- Message from Shannan Braden MD sent at 3/3/2022  8:14 AM EST -----  Regarding: Missing Injection?  Did she actually get her COVID booster? I can't sign note because administration no documented.

## 2022-03-06 DIAGNOSIS — E11.9 TYPE 2 DIABETES MELLITUS WITHOUT COMPLICATION, WITHOUT LONG-TERM CURRENT USE OF INSULIN: ICD-10-CM

## 2022-03-07 RX ORDER — MEMANTINE HYDROCHLORIDE 10 MG/1
10 TABLET ORAL 2 TIMES DAILY
Qty: 60 TABLET | Refills: 11 | OUTPATIENT
Start: 2022-03-07 | End: 2023-03-07

## 2022-03-07 RX ORDER — LOSARTAN POTASSIUM 100 MG/1
100 TABLET ORAL DAILY
Qty: 90 TABLET | Refills: 0 | Status: SHIPPED | OUTPATIENT
Start: 2022-03-07 | End: 2022-06-02

## 2022-03-14 RX ORDER — MEMANTINE HYDROCHLORIDE 10 MG/1
10 TABLET ORAL 2 TIMES DAILY
Qty: 60 TABLET | Refills: 11 | Status: SHIPPED | OUTPATIENT
Start: 2022-03-14 | End: 2022-12-12

## 2022-03-14 RX ORDER — LEVOTHYROXINE SODIUM 0.05 MG/1
50 TABLET ORAL DAILY
Qty: 90 TABLET | Refills: 0 | Status: SHIPPED | OUTPATIENT
Start: 2022-03-14 | End: 2022-06-09

## 2022-03-22 RX ORDER — QUETIAPINE FUMARATE 25 MG/1
50 TABLET, FILM COATED ORAL NIGHTLY
Qty: 60 TABLET | Refills: 11 | Status: SHIPPED | OUTPATIENT
Start: 2022-03-22 | End: 2023-03-28 | Stop reason: SDUPTHER

## 2022-06-02 RX ORDER — LOSARTAN POTASSIUM 100 MG/1
100 TABLET ORAL DAILY
Qty: 90 TABLET | Refills: 0 | Status: SHIPPED | OUTPATIENT
Start: 2022-06-02 | End: 2022-08-30

## 2022-06-03 ENCOUNTER — OFFICE VISIT (OUTPATIENT)
Dept: NEUROLOGY | Facility: CLINIC | Age: 67
End: 2022-06-03

## 2022-06-03 VITALS
OXYGEN SATURATION: 91 % | BODY MASS INDEX: 36.65 KG/M2 | HEART RATE: 72 BPM | HEIGHT: 65 IN | DIASTOLIC BLOOD PRESSURE: 70 MMHG | SYSTOLIC BLOOD PRESSURE: 108 MMHG | WEIGHT: 220 LBS

## 2022-06-03 DIAGNOSIS — G30.1 LATE ONSET ALZHEIMER'S DISEASE WITHOUT BEHAVIORAL DISTURBANCE: Primary | ICD-10-CM

## 2022-06-03 DIAGNOSIS — F02.80 LATE ONSET ALZHEIMER'S DISEASE WITHOUT BEHAVIORAL DISTURBANCE: Primary | ICD-10-CM

## 2022-06-03 PROCEDURE — 99213 OFFICE O/P EST LOW 20 MIN: CPT | Performed by: PHYSICIAN ASSISTANT

## 2022-06-03 NOTE — PROGRESS NOTES
"Subjective     Chief Complaint: memory loss      History of Present Illness   Cassie Gomez is a 67 y.o. female who returns to clinic today with a history of Alzheimer's Disease. Her  has noted symptoms since approximately mid-2016 marked largely by forgetfulness. This has gradually worsened over time. Additional associated symptoms have included impairments in orientation and executive function.      Prior evaluation has included screening blood work and an MRI of the brain which were unremarkable. She is currently taking rivastigmine patch 13.3mg daily. She is no longer taking memantine.    Today: Since her last visit in 12/21, her family has noted a continued cognitive decline. Her mood has improved with sertraline 37.5mg daily and seroquel 25mg nightly.       I have reviewed and confirmed the past family, social and medical history as accurate on 6/3/22.     Review of Systems   Unable to perform ROS: Dementia       Objective     /70   Pulse 72   Ht 165.1 cm (65\")   Wt 99.8 kg (220 lb)   SpO2 91%   BMI 36.61 kg/m²     General appearance today is normal.       Physical Exam  Neurological:      Gait: Gait is intact.      Deep Tendon Reflexes: Strength normal.   Psychiatric:         Speech: Speech normal.          Neurologic Exam     Mental Status   Oriented to person.   Disoriented to place.   Disoriented to time.   Follows 1 step commands.   Attention: decreased.   Speech: speech is normal   Level of consciousness: alert  Unable to name object. Unable to read. Unable to repeat. Unable to write. Normal comprehension.     Cranial Nerves   Cranial nerves II through XII intact.     Motor Exam   Muscle bulk: normal  Overall muscle tone: normal    Strength   Strength 5/5 throughout.     Gait, Coordination, and Reflexes     Gait  Gait: normal    Tremor   Resting tremor: absent        Results  MMSE=untestable       Assessment & Plan   Diagnoses and all orders for this visit:    1. Late onset " Alzheimer's disease without behavioral disturbance (HCC) (Primary)          Discussion/Summary   Cassie Gomez returns to clinic today for evaluation of Alzheimer's Disease . I again reviewed her current status and treatment options. After discussing potential treatment options, it was elected to continue on her current medications unchanged. She will then follow up in 6 months , or sooner if needed.   Total time of visit today: 20 minutes. As part of this visit I obtained additional history from the family which is incorporated in the HPI. I also discussed evaluation, current status, treatment options and management as discussed above.           Lavinia Randhawa PA-C

## 2022-06-09 RX ORDER — LEVOTHYROXINE SODIUM 0.05 MG/1
50 TABLET ORAL DAILY
Qty: 90 TABLET | Refills: 0 | Status: SHIPPED | OUTPATIENT
Start: 2022-06-09 | End: 2022-09-07

## 2022-06-16 ENCOUNTER — TELEPHONE (OUTPATIENT)
Dept: FAMILY MEDICINE CLINIC | Facility: CLINIC | Age: 67
End: 2022-06-16

## 2022-06-16 NOTE — TELEPHONE ENCOUNTER
Caller: CAIN LAURA     Relationship:     Best call back number: [phone]    What form or medical record are you requesting: MAP 10 FORM     Who is requesting this form or medical record from you:     How would you like to receive the form or medical records (pick-up, mail, fax): FAX   If fax, what is the fax number: [phone]  If mail, what is the address:   If pick-up, provide patient with address and location details    Timeframe paperwork needed: ASAP     Additional notes: THIS FORM WAS FAXED TO PCP ON 6/14/22 AND CAIN WOULD LIKE TO REQUEST THAT PCP COMPLETE AND FAX BACK.

## 2022-08-30 DIAGNOSIS — E11.9 TYPE 2 DIABETES MELLITUS WITHOUT COMPLICATION, WITHOUT LONG-TERM CURRENT USE OF INSULIN: ICD-10-CM

## 2022-08-30 RX ORDER — LOSARTAN POTASSIUM 100 MG/1
100 TABLET ORAL DAILY
Qty: 90 TABLET | Refills: 0 | Status: SHIPPED | OUTPATIENT
Start: 2022-08-30 | End: 2023-01-12

## 2022-09-07 RX ORDER — LEVOTHYROXINE SODIUM 0.05 MG/1
50 TABLET ORAL DAILY
Qty: 90 TABLET | Refills: 0 | Status: SHIPPED | OUTPATIENT
Start: 2022-09-07 | End: 2022-12-16

## 2022-09-19 ENCOUNTER — OFFICE VISIT (OUTPATIENT)
Dept: FAMILY MEDICINE CLINIC | Facility: CLINIC | Age: 67
End: 2022-09-19

## 2022-09-19 VITALS
HEIGHT: 65 IN | OXYGEN SATURATION: 97 % | BODY MASS INDEX: 36.61 KG/M2 | DIASTOLIC BLOOD PRESSURE: 70 MMHG | HEART RATE: 67 BPM | SYSTOLIC BLOOD PRESSURE: 120 MMHG

## 2022-09-19 DIAGNOSIS — Z78.0 POSTMENOPAUSAL: ICD-10-CM

## 2022-09-19 DIAGNOSIS — K58.2 IRRITABLE BOWEL SYNDROME WITH BOTH CONSTIPATION AND DIARRHEA: ICD-10-CM

## 2022-09-19 DIAGNOSIS — Z53.20 CERVICAL CANCER SCREENING DECLINED: ICD-10-CM

## 2022-09-19 DIAGNOSIS — Z13.820 SCREENING FOR OSTEOPOROSIS: ICD-10-CM

## 2022-09-19 DIAGNOSIS — F32.A DEPRESSIVE DISORDER: ICD-10-CM

## 2022-09-19 DIAGNOSIS — G30.1 LATE ONSET ALZHEIMER'S DISEASE WITHOUT BEHAVIORAL DISTURBANCE: ICD-10-CM

## 2022-09-19 DIAGNOSIS — E66.01 MORBID OBESITY: ICD-10-CM

## 2022-09-19 DIAGNOSIS — I10 ESSENTIAL HYPERTENSION: ICD-10-CM

## 2022-09-19 DIAGNOSIS — Z51.81 MEDICATION MONITORING ENCOUNTER: ICD-10-CM

## 2022-09-19 DIAGNOSIS — E11.9 TYPE 2 DIABETES MELLITUS WITHOUT COMPLICATION, WITHOUT LONG-TERM CURRENT USE OF INSULIN: ICD-10-CM

## 2022-09-19 DIAGNOSIS — Z53.20 MAMMOGRAM DECLINED: ICD-10-CM

## 2022-09-19 DIAGNOSIS — F02.80 LATE ONSET ALZHEIMER'S DISEASE WITHOUT BEHAVIORAL DISTURBANCE: ICD-10-CM

## 2022-09-19 DIAGNOSIS — E66.01 CLASS 2 SEVERE OBESITY DUE TO EXCESS CALORIES WITH SERIOUS COMORBIDITY AND BODY MASS INDEX (BMI) OF 36.0 TO 36.9 IN ADULT: ICD-10-CM

## 2022-09-19 DIAGNOSIS — E03.9 ACQUIRED HYPOTHYROIDISM: ICD-10-CM

## 2022-09-19 DIAGNOSIS — E78.2 MIXED HYPERLIPIDEMIA: ICD-10-CM

## 2022-09-19 DIAGNOSIS — Z00.00 MEDICARE ANNUAL WELLNESS VISIT, SUBSEQUENT: Primary | ICD-10-CM

## 2022-09-19 PROCEDURE — G0439 PPPS, SUBSEQ VISIT: HCPCS | Performed by: FAMILY MEDICINE

## 2022-09-19 PROCEDURE — 1160F RVW MEDS BY RX/DR IN RCRD: CPT | Performed by: FAMILY MEDICINE

## 2022-09-19 PROCEDURE — 1170F FXNL STATUS ASSESSED: CPT | Performed by: FAMILY MEDICINE

## 2022-09-19 PROCEDURE — 1126F AMNT PAIN NOTED NONE PRSNT: CPT | Performed by: FAMILY MEDICINE

## 2022-09-19 NOTE — PATIENT INSTRUCTIONS
Medicare Wellness  Personal Prevention Plan of Service     Date of Office Visit:    Encounter Provider:  Shannan Braden MD  Place of Service:  [facility]  Patient Name: Cassie Vasquez Jason  :  1955    As part of the Medicare Wellness portion of your visit today, we are providing you with this personalized preventive plan of services (PPPS). This plan is based upon recommendations of the [place] Preventive Services Task Force (USPSTF) and the Advisory Committee on Immunization Practices (ACIP).    This lists the preventive care services that should be considered, and provides dates of when you are due. Items listed as completed are up-to-date and do not require any further intervention.    Health Maintenance   Topic Date Due    TDAP/TD VACCINES (1 - Tdap) Never done    ZOSTER VACCINE (2 of 3) 2017    PAP SMEAR  2018    URINE MICROALBUMIN  2020    DIABETIC FOOT EXAM  2021    LIPID PANEL  2021    MAMMOGRAM  2022    ANNUAL WELLNESS VISIT  2022    DXA SCAN  2022    COVID-19 Vaccine (4 - Booster for Pfizer series) 2022    HEMOGLOBIN A1C  2022    DIABETIC EYE EXAM  2022    INFLUENZA VACCINE  10/01/2022    COLORECTAL CANCER SCREENING  2026    HEPATITIS C SCREENING  Completed    Pneumococcal Vaccine 65+  Completed       No orders of the defined types were placed in this encounter.      Return in about 6 months (around 3/19/2023).

## 2022-09-19 NOTE — PROGRESS NOTES
The ABCs of the Annual Wellness Visit  Subsequent Medicare Wellness Visit    Chief Complaint   Patient presents with   • Medicare Wellness-subsequent      Subjective    History of Present Illness:  Cassie Gomez is a 67 y.o. female who presents for a Subsequent Medicare Wellness Visit.    She has HTN which has been stable on cozaar. Denies side effects. No CP, palpitations, orthopnea, swelling.     She has HLP for which she is on statin. Tolerating well.     She has NIDDM which has been fairly well controlled. Getting irregular exercise. Not recently following diabetic diet. tolerating metformin well. BG not being checked regularly as this causes her emotional distress.     She has IBS with mixed constipation and diarrhea. Denies recent flares. Denies abd pain or blood in stool.     Has acquired hypoTH. Taking replacement as directed. Some fatigue. Otherwise stable.     Has chronic depression as well as alzhemers dementia. followed by [facility]. On zoloft, exelon patch, namenda. Taking as directed. Denies side effects. No recent falls or injuries.  has some trouble getting her to take meds on occasion. Using low dose melatonin qhs for sleep, this has decreased middle of night awakenings during which she has been confused. He feels her symptoms are progressing; increased agitation today. Has had recent f/u with neurology.    Pt's previous HPI reviewed and updated as indicated.     The following portions of the patient's history were reviewed and   updated as appropriate: allergies, current medications, past family history, past medical history, past social history, past surgical history and problem list.    Compared to one year ago, the patient feels her physical   health is the same.    Compared to one year ago, the patient feels her mental   health is worse.    Recent Hospitalizations:  She was not admitted to the hospital during the last year.       Current Medical Providers:  Patient Care  Team:  Shannan Braden MD as PCP - General (Family Medicine)  Lew Jean-Baptiste MD as Consulting Physician (Neurology)  Judy Soliz MD as Surgeon (General Surgery)  Ching Prado MD as Consulting Physician (Obstetrics and Gynecology)    Outpatient Medications Prior to Visit   Medication Sig Dispense Refill   • Cholecalciferol (VITAMIN D3) 5000 UNITS capsule capsule Take 5,000 Units by mouth Daily.     • Cyanocobalamin (VITAMIN B-12) 500 MCG sublingual tablet      • glucose blood test strip 1 each by Other route Daily. Use as instructed 100 each 1   • levothyroxine (SYNTHROID, LEVOTHROID) 50 MCG tablet TAKE 1 TABLET BY MOUTH DAILY 90 tablet 0   • losartan (COZAAR) 100 MG tablet TAKE 1 TABLET BY MOUTH DAILY 90 tablet 0   • lovastatin (MEVACOR) 40 MG tablet Take 1 tablet by mouth Every Night. 90 tablet 1   • memantine (NAMENDA) 10 MG tablet Take 1 tablet by mouth 2 (Two) Times a Day. 60 tablet 11   • metFORMIN (GLUCOPHAGE) 500 MG tablet TAKE 2 TABLETS BY MOUTH TWICE DAILY WITH MEALS 360 tablet 1   • Multiple Vitamins-Minerals (PRESERVISION AREDS 2+MULTI VIT PO)      • QUEtiapine (SEROquel) 25 MG tablet Take 2 tablets by mouth Every Night. 60 tablet 11   • rivastigmine (EXELON) 13.3 MG/24HR patch Place 1 patch on the skin as directed by provider Daily. 30 patch 11   • sertraline (ZOLOFT) 50 MG tablet Take 1 tablet by mouth Daily. 90 tablet 3   • SF 5000 PLUS 1.1 % cream See Admin Instructions.       No facility-administered medications prior to visit.       No opioid medication identified on active medication list. I have reviewed chart for other potential  high risk medication/s and harmful drug interactions in the elderly.          Aspirin is not on active medication list.  Aspirin use is indicated based on review of current medical condition/s. Pros and cons of this therapy have been discussed with this patient. Benefits of this medication outweigh potential harm.  Patient has been instructed to  "start taking this medication..    Patient Active Problem List   Diagnosis   • Late onset Alzheimer's disease without behavioral disturbance (HCC)   • Type 2 diabetes mellitus without complication, without long-term current use of insulin (HCC)   • Acquired hypothyroidism   • Mixed hyperlipidemia   • Irritable bowel syndrome with both constipation and diarrhea   • Essential hypertension   • Depressive disorder     Advance Care Planning  Advance Directive is on file.  ACP discussion was held with the patient during this visit. Patient has an advance directive in EMR which is still valid.     Review of Systems   Constitutional: Negative for appetite change, fatigue and fever.   HENT: Negative for mouth sores, nosebleeds and trouble swallowing.    Eyes: Negative for visual disturbance.   Respiratory: Negative for cough, shortness of breath and wheezing.    Cardiovascular: Negative for chest pain, palpitations and leg swelling.   Gastrointestinal: Negative for abdominal pain, blood in stool, constipation, diarrhea, nausea and vomiting.   Genitourinary: Negative for dysuria, hematuria and vaginal bleeding.   Musculoskeletal: Negative for arthralgias and gait problem.   Skin: Negative for rash and wound.   Neurological: Positive for confusion.   Psychiatric/Behavioral: Positive for agitation, behavioral problems and dysphoric mood. Negative for hallucinations and sleep disturbance. The patient is nervous/anxious.         Objective    Vitals:    09/19/22 1305   BP: 120/70   Pulse: 67   SpO2: 97%   Weight: Comment: unable to weigh   Height: 165.1 cm (65\")   PainSc: 0-No pain     Estimated body mass index is 36.61 kg/m² as calculated from the following:    Height as of this encounter: 165.1 cm (65\").    Weight as of 6/3/22: 99.8 kg (220 lb).    Class 2 Severe Obesity (BMI >=35 and <=39.9). Obesity-related health conditions include the following: hypertension, diabetes mellitus and dyslipidemias. Obesity is unchanged. BMI is " is above average; BMI management plan is completed. We discussed portion control, increasing exercise and management of depression/anxiety/stress to control compensatory eating.      Does the patient have evidence of cognitive impairment? Yes    Physical Exam  Vitals and nursing note reviewed.   Constitutional:       General: She is not in acute distress.     Appearance: She is well-developed and well-groomed. She is obese. She is not ill-appearing.      Comments: Appears older than stated age   HENT:      Head: Normocephalic.      Mouth/Throat:      Mouth: Mucous membranes are moist.   Eyes:      General: No scleral icterus.     Conjunctiva/sclera: Conjunctivae normal.   Cardiovascular:      Rate and Rhythm: Normal rate and regular rhythm.      Pulses: Normal pulses.      Heart sounds: Normal heart sounds.   Pulmonary:      Effort: Pulmonary effort is normal.      Breath sounds: Normal breath sounds and air entry.   Musculoskeletal:      Right lower leg: No edema.      Left lower leg: No edema.   Skin:     General: Skin is warm and dry.      Coloration: Skin is not jaundiced or pale.      Findings: No bruising or rash.   Neurological:      Mental Status: She is alert. Mental status is at baseline.      Motor: Motor function is intact.      Gait: Gait is intact.   Psychiatric:         Mood and Affect: Mood normal. Affect is labile (mildly).         Speech: Speech is delayed (mildly, trouble completing sentences).         Behavior: Behavior is agitated (mildly) and slowed (mildly, some difficulty following multi-step instructions). Behavior is cooperative.         Cognition and Memory: Cognition is impaired. Memory is impaired.     Pt's previous physical exam reviewed and updated as indicated.                HEALTH RISK ASSESSMENT    Smoking Status:  Social History     Tobacco Use   Smoking Status Never Smoker   Smokeless Tobacco Never Used     Alcohol Consumption:  Social History     Substance and Sexual Activity    Alcohol Use No     Fall Risk Screen:    VINCENTADI Fall Risk Assessment was completed, and patient is at LOW risk for falls.Assessment completed on:9/19/2022    Depression Screening:  PHQ-2/PHQ-9 Depression Screening 9/19/2022   Retired PHQ-9 Total Score -   Retired Total Score -   Feeling Down, Depressed or Hopeless 0-->not at all   PHQ-9: Brief Depression Severity Measure Score 0       Health Habits and Functional and Cognitive Screening:  Functional & Cognitive Status 9/19/2022   Do you have difficulty preparing food and eating? Yes   Do you have difficulty bathing yourself, getting dressed or grooming yourself? Yes   Do you have difficulty using the toilet? No   Do you have difficulty moving around from place to place? Yes   Do you have trouble with steps or getting out of a bed or a chair? Yes   Current Diet Well Balanced Diet   Dental Exam Up to date   Eye Exam Up to date   Exercise (times per week) 0 times per week   Current Exercises Include No Regular Exercise   Current Exercise Activities Include -   Do you need help using the phone?  Yes   Are you deaf or do you have serious difficulty hearing?  No   Do you need help with transportation? Yes   Do you need help shopping? Yes   Do you need help preparing meals?  Yes   Do you need help with housework?  Yes   Do you need help with laundry? Yes   Do you need help taking your medications? Yes   Do you need help managing money? Yes   Do you ever drive or ride in a car without wearing a seat belt? No   Have you felt unusual stress, anger or loneliness in the last month? No   Who do you live with? Spouse   If you need help, do you have trouble finding someone available to you? No   Have you been bothered in the last four weeks by sexual problems? -   Do you have difficulty concentrating, remembering or making decisions? Yes       Age-appropriate Screening Schedule:  Refer to the list below for future screening recommendations based on patient's age, sex and/or  medical conditions. Orders for these recommended tests are listed in the plan section. The patient has been provided with a written plan.    Health Maintenance   Topic Date Due   • TDAP/TD VACCINES (1 - Tdap) Never done   • ZOSTER VACCINE (2 of 3) 11/21/2017   • URINE MICROALBUMIN  05/21/2020   • DIABETIC FOOT EXAM  05/27/2021   • LIPID PANEL  12/01/2021   • DXA SCAN  07/01/2022   • HEMOGLOBIN A1C  09/02/2022   • PAP SMEAR  09/01/2023 (Originally 3/18/2018)   • MAMMOGRAM  09/22/2023 (Originally 1/31/2022)   • DIABETIC EYE EXAM  09/28/2022   • INFLUENZA VACCINE  10/01/2022              Assessment & Plan   CMS Preventative Services Quick Reference  Risk Factors Identified During Encounter  Depression/Dysphoria  Fall Risk-High or Moderate  Immunizations Discussed/Encouraged (specific Immunizations; Tdap, Influenza, Pneumococcal 23, Prevnar 20 (Pneumococcal 20-valent conjugate), Shingrix and COVID19  Inactivity/Sedentary  Obesity/Overweight   The above risks/problems have been discussed with the patient.  Follow up actions/plans if indicated are seen below in the Assessment/Plan Section.  Pertinent information has been shared with the patient in the After Visit Summary.    Diagnoses and all orders for this visit:    1. Medicare annual wellness visit, subsequent (Primary)    2. Mixed hyperlipidemia  -     Comprehensive Metabolic Panel; Future  -     Lipid Panel; Future    3. Essential hypertension  -     Microalbumin / Creatinine Urine Ratio - Urine, Clean Catch; Future  -     CBC & Differential; Future  -     Comprehensive Metabolic Panel; Future    4. Type 2 diabetes mellitus without complication, without long-term current use of insulin (HCC)  -     Microalbumin / Creatinine Urine Ratio - Urine, Clean Catch; Future  -     CBC & Differential; Future  -     Comprehensive Metabolic Panel; Future  -     Hemoglobin A1c; Future    5. Acquired hypothyroidism  -     TSH Rfx On Abnormal To Free T4; Future    6. Late onset  Alzheimer's disease without behavioral disturbance (HCC)    7. Depressive disorder    8. Irritable bowel syndrome with both constipation and diarrhea    9. Postmenopausal  -     DEXA Bone Density Axial; Future    10. Screening for osteoporosis  -     DEXA Bone Density Axial; Future    11. Medication monitoring encounter  -     CBC & Differential; Future  -     Comprehensive Metabolic Panel; Future    12. Mammogram declined    13. Cervical cancer screening declined    14. Class 2 severe obesity due to excess calories with serious comorbidity and body mass index (BMI) of 36.0 to 36.9 in adult (HCC)    15. Morbid obesity (HCC)      Chronic conditions appear generally stable other than her dementia which continues to slowly progress. Continue current meds. F/u with neurology as scheduled.    Follow Up:   Return in about 6 months (around 3/19/2023). f/u sooner as needed/instructed.  Return at caregiver convenience for routine lab eval.  I will contact patient/caregiver regarding test results and provide instructions regarding any necessary changes in plan of care.  Caregiver was encouraged to keep me informed of any acute changes, lack of improvement, or any new concerning symptoms.  Caregiver is aware of reasons to seek emergent care.  Caregiver voiced understanding of all instructions and denied further questions.  .  An After Visit Summary and PPPS were made available to the patient.                 Please note that portions of this note may have been completed with a voice recognition program. Efforts were made to edit the dictations, but occasionally words are mistranscribed.

## 2022-10-04 ENCOUNTER — APPOINTMENT (OUTPATIENT)
Dept: BONE DENSITY | Facility: HOSPITAL | Age: 67
End: 2022-10-04

## 2022-10-04 DIAGNOSIS — Z13.820 SCREENING FOR OSTEOPOROSIS: ICD-10-CM

## 2022-10-04 DIAGNOSIS — Z78.0 POSTMENOPAUSAL: ICD-10-CM

## 2022-10-04 PROCEDURE — 77080 DXA BONE DENSITY AXIAL: CPT

## 2022-10-04 NOTE — TELEPHONE ENCOUNTER
Rx Refill Note  Requested Prescriptions     Pending Prescriptions Disp Refills   • sertraline (ZOLOFT) 50 MG tablet [Pharmacy Med Name: SERTRALINE 50MG TABLETS] 90 tablet 3     Sig: TAKE 1 TABLET BY MOUTH DAILY      Last filled:12/14/2021  Last office visit with prescribing clinician: 6/3/2022      Next office visit with prescribing clinician: 12/13/2022     Evy Wells MA  10/04/22, 09:06 EDT

## 2022-10-12 NOTE — MR AVS SNAPSHOT
Cassie Vasquez Jason   1/11/2017 11:00 AM   Office Visit    Dept Phone:  [phone]   Encounter #:  [id number]    Provider:  Lew Jean-Baptiste MD   Department:  [facility] MEDICAL [facility] NEUROLOGY                Your Full Care Plan              Today's Medication Changes          These changes are accurate as of: 1/11/17 11:42 AM.  If you have any questions, ask your nurse or doctor.               New Medication(s)Ordered:     donepezil 5 MG tablet   Commonly known as:  ARICEPT   Take 1 tablet by mouth Daily.   Started by:  Lew Jean-Baptiste MD            Where to Get Your Medications      These medications were sent to [facility] [address] [facility], [place] - [address] - [phone]  - [phone] FX  [address], [facility] [address]     Phone:  [phone]     donepezil 5 MG tablet                  Your Updated Medication List          This list is accurate as of: 1/11/17 11:42 AM.  Always use your most recent med list.                citalopram 10 MG tablet   Commonly known as:  CeleXA       donepezil 5 MG tablet   Commonly known as:  ARICEPT   Take 1 tablet by mouth Daily.       famciclovir 125 MG tablet   Commonly known as:  FAMVIR       levothyroxine 50 MCG tablet   Commonly known as:  SYNTHROID, LEVOTHROID       losartan 100 MG tablet   Commonly known as:  COZAAR       lovastatin 40 MG tablet   Commonly known as:  MEVACOR       metFORMIN 500 MG tablet   Commonly known as:  GLUCOPHAGE       vitamin B-12 1000 MCG tablet   Commonly known as:  CYANOCOBALAMIN       vitamin D3 5000 UNITS capsule capsule               You Were Diagnosed With        Codes Comments    Mild cognitive impairment    -  Primary ICD-10-CM: G31.84  ICD-9-CM: 331.83       Instructions     None    Patient Instructions History      Upcoming Appointments     Visit Type Date Time Department    NEW PATIENT 1/11/2017 11:00 AM [facility]      [facility] Signup     [facility] allows you to send  "messages to your doctor, view your test results, renew your prescriptions, schedule appointments, and more. To sign up, go to [email or web address] and click on the Sign Up Now link in the New User? box. Enter your [employer] Activation Code exactly as it appears below along with the last four digits of your Social Security Number and your Date of Birth () to complete the sign-up process. If you do not sign up before the expiration date, you must request a new code.    [employer] Activation Code: [email or web address]  Expires: 2017 11:42 AM    If you have questions, you can email [email or web address] or call [phone] to talk to our [employer] staff. Remember, [employer] is NOT to be used for urgent needs. For medical emergencies, dial 911.               Other Info from Your Visit           Allergies     No Known Allergies      Reason for Visit     Memory Loss           Vital Signs     Blood Pressure Height Weight Body Mass Index Smoking Status       121/82 65\" (165.1 cm) 200 lb (90.7 kg) 33.28 kg/m2 Never Smoker       Problems and Diagnoses Noted     Mild cognitive impairment        " Awake/Alert

## 2022-11-08 ENCOUNTER — TELEPHONE (OUTPATIENT)
Dept: FAMILY MEDICINE CLINIC | Facility: CLINIC | Age: 67
End: 2022-11-08

## 2022-11-08 NOTE — TELEPHONE ENCOUNTER
Caller: JAVID    Relationship: Other    Best call back number: [phone]    What orders are you requesting (i.e. lab or imaging): ADULT WIPES AND GLOVES    In what timeframe would the patient need to come in: ASAP    Where will you receive your lab/imaging services: [facility]    Additional notes: JAVID FROM [facility] CALLING TO GET PRESCRIPTION WROTE FOR ADULT WIPES AND GLOVES    FAX #: [phone]

## 2022-11-09 NOTE — TELEPHONE ENCOUNTER
Spoke with Lilian.  She said Patient need Medium Gloves.  These are PA'd for 1 year so the RX can say 6 boxes q year  She needs Wipes 10-12 wipes per day quantity sufficient for 1 year

## 2022-12-02 NOTE — TELEPHONE ENCOUNTER
Caller: JAVID    Relationship to patient: Other    Best call back number: [phone]    What is the call regarding:  LAURI IS CALLING CHECKING ON THE STATUS OF THE REQUEST.

## 2022-12-12 RX ORDER — MEMANTINE HYDROCHLORIDE 10 MG/1
TABLET ORAL
Qty: 60 TABLET | Refills: 11 | Status: SHIPPED | OUTPATIENT
Start: 2022-12-12

## 2022-12-12 NOTE — TELEPHONE ENCOUNTER
Rx Refill Note  Requested Prescriptions     Pending Prescriptions Disp Refills   • memantine (NAMENDA) 10 MG tablet [Pharmacy Med Name: MEMANTINE 10MG TABLETS] 60 tablet 11     Sig: TAKE 1 TABLET BY MOUTH TWICE DAILY      Last filled:03/14/2022  Last office visit with prescribing clinician: 6/3/2022      Next office visit with prescribing clinician: 12/13/2022     Evy Wells MA  12/12/22, 16:21 EST

## 2022-12-16 RX ORDER — LEVOTHYROXINE SODIUM 0.05 MG/1
50 TABLET ORAL DAILY
Qty: 90 TABLET | Refills: 0 | Status: SHIPPED | OUTPATIENT
Start: 2022-12-16

## 2022-12-19 ENCOUNTER — TELEPHONE (OUTPATIENT)
Dept: FAMILY MEDICINE CLINIC | Facility: CLINIC | Age: 67
End: 2022-12-19

## 2022-12-19 NOTE — TELEPHONE ENCOUNTER
Caller: LAURI    Relationship: Home Health    Best call back number: [phone]    Equipment requested: DISPOSABLE ADULT WASH CLOTHS    Reason for the request: INCONTENCE    Prescribing Provider: DR. WHYTE    Additional information or concerns: SHE HAS REQUESTED THESE SEVERAL TIMES, THIS IS THIRD TIME.  IF  DOESN'T WANT TO PRESCRIBE, LET HER KNOW PLEASE.

## 2022-12-19 NOTE — TELEPHONE ENCOUNTER
Supplies were faxed 11/14/2022 (confirmation sheet was also scanned).  Left detailed message to see if this was received and if there is something else they need.

## 2023-01-03 RX ORDER — RIVASTIGMINE 13.3 MG/24H
PATCH, EXTENDED RELEASE TRANSDERMAL
Qty: 30 PATCH | Refills: 11 | Status: SHIPPED | OUTPATIENT
Start: 2023-01-03

## 2023-01-03 NOTE — TELEPHONE ENCOUNTER
Rx Refill Note  Requested Prescriptions     Pending Prescriptions Disp Refills   • rivastigmine (EXELON) 13.3 MG/24HR patch [Pharmacy Med Name: RIVASTIGMINE 13.3MG/24H PATCH] 30 patch 11     Sig: APPLY ONE PATCH ON THE SKIN DAILY AS DIRECTED      Last filled:12/14/2021 with 11 refills. Sent  Last office visit with prescribing clinician: 6/3/2022      Next office visit with prescribing clinician: Visit date not found     Joel Rees MA  01/03/23, 15:37 EST

## 2023-01-11 NOTE — TELEPHONE ENCOUNTER
Caller: LAURI    Relationship: Home Health    Best call back number: [phone]    What form or medical record are you requesting: PRESCRIPTION FOR ADULT DISPOSABLE WASH CLOTHS    Who is requesting this form or medical record from you: N/A    How would you like to receive the form or medical records (pick-up, mail, fax): FAX  [phone]      Timeframe paperwork needed: N/A    Additional notes: LAURI STATED THAT THE PRESCRIPTION THAT WAS FAXED PREVIOUSLY IS NOT THE CORRECT ITEM THAT WASH CLOTHS ARE THICKER AND BIGGER AND WOULD NEED FOR ABOVE ITEMS     PLEASE ADVISE

## 2023-01-12 RX ORDER — LOSARTAN POTASSIUM 100 MG/1
100 TABLET ORAL DAILY
Qty: 90 TABLET | Refills: 0 | Status: SHIPPED | OUTPATIENT
Start: 2023-01-12

## 2023-02-07 ENCOUNTER — TELEPHONE (OUTPATIENT)
Dept: FAMILY MEDICINE CLINIC | Facility: CLINIC | Age: 68
End: 2023-02-07

## 2023-02-07 DIAGNOSIS — R19.7 DIARRHEA OF PRESUMED INFECTIOUS ORIGIN: Primary | ICD-10-CM

## 2023-02-07 NOTE — TELEPHONE ENCOUNTER
Caller: dallin berger    Relationship: Emergency Contact    Best call back number: [phone]    What medication are you requesting: MEDICATION FOR DIARRHEA    What are your current symptoms: DIARRHEA 8 TIMES TODAY 02.07.23    How long have you been experiencing symptoms: ON AND OFF SINCE OCTOBER    Have you had these symptoms before:    [] Yes  [x] No    Have you been treated for these symptoms before:   [] Yes  [x] No    If a prescription is needed, what is your preferred pharmacy and phone number:

## 2023-02-07 NOTE — TELEPHONE ENCOUNTER
Caller: CHERI    Relationship to patient: Provider    Best call back number: [phone]    Patient is needing: CHERI IS  AT PATIENT'S WAIVER AND WAS CALLING TO LET PROVIDER KNOW THAT PATIENT HAS HAD 6 FOUL SMELLING AND VERY EXPLOSIVE DIARRHEA ACCIDENTS THAT GOES UP PATIENT'S BACK AND DOWN HER LEG AND WOULD LIKE SOME ADVICE ON WHAT TO DO OR ANY MEDICATIONS TO HELP RELIEVE THIS ISSUE    PLEASE ADVISE CHERI OR CALL SON     SHIMA CALL BACK: [phone]

## 2023-02-08 NOTE — TELEPHONE ENCOUNTER
Needs c dif test. Done at [facility] so it can be done stat. Order in chart    Imodium liquid or tablet OTC is fine

## 2023-02-09 ENCOUNTER — TELEPHONE (OUTPATIENT)
Dept: FAMILY MEDICINE CLINIC | Facility: CLINIC | Age: 68
End: 2023-02-09

## 2023-02-09 DIAGNOSIS — F02.80 LATE ONSET ALZHEIMER'S DISEASE WITHOUT BEHAVIORAL DISTURBANCE: ICD-10-CM

## 2023-02-09 DIAGNOSIS — G30.1 LATE ONSET ALZHEIMER'S DISEASE WITHOUT BEHAVIORAL DISTURBANCE: ICD-10-CM

## 2023-02-09 DIAGNOSIS — F32.A DEPRESSIVE DISORDER: Primary | ICD-10-CM

## 2023-02-09 DIAGNOSIS — F02.818 ALZHEIMER'S DEMENTIA WITH BEHAVIORAL DISTURBANCE: ICD-10-CM

## 2023-02-09 DIAGNOSIS — G30.9 ALZHEIMER'S DEMENTIA WITH BEHAVIORAL DISTURBANCE: ICD-10-CM

## 2023-02-15 ENCOUNTER — TELEPHONE (OUTPATIENT)
Dept: FAMILY MEDICINE CLINIC | Facility: CLINIC | Age: 68
End: 2023-02-15
Payer: MEDICARE

## 2023-02-15 NOTE — TELEPHONE ENCOUNTER
"FMLA form received from \"Juan Carlos Gomez\" who I am assuming is pt's son. Not sure we even have contact info for this patient.     I need to know a little more detail in the type and intervals of time off he is needing.   "

## 2023-02-15 NOTE — TELEPHONE ENCOUNTER
Spoke with Juan Carlos.  He said he didn't have any consecutive days he would need to be out.  His mother has a caregiver most days from 10-6.  He is just requesting to take off as needed for appointments or in the rare case her caregiver can not make it.  I asked him for specifics like once a week or once a month etc.  He said once weekly would be great although he doesn't foresee having to use it that often.

## 2023-02-27 ENCOUNTER — TELEPHONE (OUTPATIENT)
Dept: FAMILY MEDICINE CLINIC | Facility: CLINIC | Age: 68
End: 2023-02-27

## 2023-02-27 NOTE — TELEPHONE ENCOUNTER
Caller: LAURI    Relationship:     CALLER FROM [facility] DEPARTMENT    Best call back number:      [phone]     Equipment requested:     CALLER REQUESTED A PRESCRIPTION/ORDER FOR MATERIALS LISTED BELOW FOR PATIENT    ADULT INCONTINENCE WIPES - 20 PER DAY    DISPOSABLE WASH CLOTHS - 20 PER DAY    ADULT DISPOSABLE GLOVES - 20 PER DAY    Prescribing Provider:     DR WHYTE    PLEASE FAX PRESCRIPTION/ORDER FOR MATERIALS LISTED TO:    [phone]    DR WHYTE

## 2023-03-28 DIAGNOSIS — F02.80 LATE ONSET ALZHEIMER'S DISEASE WITHOUT BEHAVIORAL DISTURBANCE: Primary | ICD-10-CM

## 2023-03-28 DIAGNOSIS — G30.1 LATE ONSET ALZHEIMER'S DISEASE WITHOUT BEHAVIORAL DISTURBANCE: Primary | ICD-10-CM

## 2023-03-29 RX ORDER — QUETIAPINE FUMARATE 25 MG/1
50 TABLET, FILM COATED ORAL NIGHTLY
Qty: 180 TABLET | Refills: 3 | Status: SHIPPED | OUTPATIENT
Start: 2023-03-29

## 2023-06-30 PROBLEM — F02.C3 SEVERE LATE ONSET ALZHEIMER'S DEMENTIA WITH MOOD DISTURBANCE: Status: ACTIVE | Noted: 2017-01-11

## 2024-01-12 ENCOUNTER — PRIOR AUTHORIZATION (OUTPATIENT)
Dept: NEUROLOGY | Facility: CLINIC | Age: 69
End: 2024-01-12
Payer: MEDICARE

## 2024-02-08 ENCOUNTER — TELEMEDICINE (OUTPATIENT)
Dept: NEUROLOGY | Facility: CLINIC | Age: 69
End: 2024-02-08
Payer: MEDICARE

## 2024-02-08 DIAGNOSIS — R53.1 WEAKNESS: ICD-10-CM

## 2024-02-08 DIAGNOSIS — F02.C3 SEVERE LATE ONSET ALZHEIMER'S DEMENTIA WITH MOOD DISTURBANCE: Primary | ICD-10-CM

## 2024-02-08 DIAGNOSIS — R53.81 DEBILITY: ICD-10-CM

## 2024-02-08 DIAGNOSIS — G30.1 SEVERE LATE ONSET ALZHEIMER'S DEMENTIA WITH MOOD DISTURBANCE: Primary | ICD-10-CM

## 2024-02-08 PROCEDURE — 1160F RVW MEDS BY RX/DR IN RCRD: CPT | Performed by: NURSE PRACTITIONER

## 2024-02-08 PROCEDURE — 99214 OFFICE O/P EST MOD 30 MIN: CPT | Performed by: NURSE PRACTITIONER

## 2024-02-08 PROCEDURE — 1159F MED LIST DOCD IN RCRD: CPT | Performed by: NURSE PRACTITIONER

## 2024-02-08 RX ORDER — MEMANTINE HYDROCHLORIDE 10 MG/1
10 TABLET ORAL 2 TIMES DAILY
Qty: 180 TABLET | Refills: 3 | Status: SHIPPED | OUTPATIENT
Start: 2024-02-08

## 2024-02-08 RX ORDER — GLIPIZIDE 5 MG/1
1 TABLET ORAL DAILY
COMMUNITY
Start: 2024-01-05

## 2024-02-08 RX ORDER — RIVASTIGMINE 13.3 MG/24H
1 PATCH, EXTENDED RELEASE TRANSDERMAL DAILY
Qty: 30 PATCH | Refills: 11 | Status: SHIPPED | OUTPATIENT
Start: 2024-02-08

## 2024-02-08 RX ORDER — QUETIAPINE FUMARATE 25 MG/1
50 TABLET, FILM COATED ORAL NIGHTLY
Qty: 180 TABLET | Refills: 3 | Status: SHIPPED | OUTPATIENT
Start: 2024-02-08

## 2024-02-08 NOTE — LETTER
2024     Shannan Braden MD  [address] [place] [address]    Patient: Cassie Gomez   YOB: 1955   Date of Visit: 2024     Dear Shannan Braden MD:       Thank you for referring Cassie Gomez to me for evaluation. Below are the relevant portions of my assessment and plan of care.    If you have questions, please do not hesitate to call me. I look forward to following Cassie along with you.         Sincerely,        VALENTINA Henry        CC: No Recipients    Lila Fall APRN  [id number]  Sign when Signing Visit  Subjective:     Patient ID: Cassie Gomez is a 69 y.o. female.    CC:   Chief Complaint   Patient presents with   • Alzheimer's Disease   • Fatigue       HPI:   History of Present Illness  This visit was completed face to face via VIDEO by Epic/Aasonn with verbal consent to treat obtained from patient and/or family.  Provider confirmed the patient's name and  at the start of visit. Patient and/or family confirms that they are located in [place] during visit and Provider is located in [facility]rology Clinic in [place], [place] during visit.    Today 2024-  This is a 69-year-old female completing video visit neurology follow-up on severe Alzheimer's disease present since 2016. Family provides history. She is currently on memantine, Exelon patch, Seroquel, as well as sertraline. She is with her  and caregiver during the video visit.    Today, her  reports she has had changes in her health. He states her legs are getting weaker, and they noticed yesterday, 2024, and today, 2024, her speech is somewhat slurred, which is a new thing for her. The other day, they forgot to administer her morning medication. He told their son to give her everything that night, so he may have given her too much medication. She is sleeping on the couch comfortably now he tells me.    Her caregiver states she is a lot weaker than she used to be,  "especially in her legs. She notes she cannot walk far before her legs \"give out\" on her. She has had several falls within the last month. The weakness started in her left leg, but it is now in her right leg. They have discussed this with her primary care provider. They are trying to get her a hospital bed to help with this. They would like home health to work on strengthening exercises with her. Her  states they are in the process of getting a new nurse practitioner to come to their home.     They confirm her appetite has been okay, and they have not noticed any significant weight loss. They report she \"babbles,\" but does not usually make sense. There are times they can understand what she is saying, but often times, it is random, and they do not understand what she is saying. She is sleeping well with the Seroquel.    Her caregiver reports her skin has been dry in some places, but it is good overall. She denies any skin breakdown.    Her caregiver confirms they have a living will or advanced directive for her. She is a DNR. Her  notes they prefer very conservative therapy.    Her  has idiopathic pulmonary fibrosis and wears oxygen at all times.    Prior Neurological Workup & History:  Alzheimer's Disease. Her  has noted symptoms since approximately mid-2016 marked largely by forgetfulness. This has gradually worsened over time. Additional associated symptoms have included impairments in orientation and executive function and now all spheres of cognition.  and caregiver manage all daily activities. She does not drive. She has minimal verbal skills.      Prior evaluation has included screening blood work and an MRI of the brain which were unremarkable.     Alzheimer's disease diagnosed around 2016. Her MMSE is untestable. She has been on a rivastigmine 13.3 mg patch. She has been on Seroquel 25 mg 2 pills every night and sertraline 50 mg daily. She is also on memantine 10 mg twice " per day. Her  is her medical power of . She lives with her .     No history of drug use, alcohol, smoking, or cigarettes. Her father had dementia.   She has diabetes. She also has hypertension and hyperlipidemia. She is also on oral B12 supplementation and vitamin D supplementation.     Today, her  reports that her memory has worsened over the past year. He states that She is totally dependent on someone to help with bathing and daily hygiene. He states that she has urine and bowel incontinence but states that her skin has been okay and intact.   Her father also had Alzheimer's or dementia and he was 87 years old when he . He states that she was initially diagnosed by Dr. Jean-Baptiste in 2016 with [facility].     The following portions of the patient's history were reviewed and updated as appropriate: allergies, current medications, past family history, past medical history, past social history, past surgical history, and problem list.    Past Medical History:   Diagnosis Date   • Alzheimer disease    • Colon polyp    • Depression    • Diabetes mellitus    • H/O bone density study     normal   • Memory loss    • Thyroid disease    • Thyroid enlargement        Past Surgical History:   Procedure Laterality Date   • ADENOIDECTOMY     •  SECTION  1978   • TONSILLECTOMY         Social History     Socioeconomic History   • Marital status:    Tobacco Use   • Smoking status: Never   • Smokeless tobacco: Never   Vaping Use   • Vaping Use: Never used   Substance and Sexual Activity   • Alcohol use: No   • Drug use: No   • Sexual activity: Not Currently     Partners: Male     Birth control/protection: Post-menopausal       Family History   Problem Relation Age of Onset   • Diabetes Mother    • Heart disease Mother    • Heart attack Mother    • COPD Mother    • Dementia Father    • Diabetes Father    • Stroke Father         In his Left eye   • Squamous cell carcinoma Brother          oral          Current Outpatient Medications:   •  glipizide (GLUCOTROL) 5 MG tablet, Take 1 tablet by mouth Daily., Disp: , Rfl:   •  memantine (NAMENDA) 10 MG tablet, Take 1 tablet by mouth 2 (Two) Times a Day., Disp: 180 tablet, Rfl: 3  •  QUEtiapine (SEROquel) 25 MG tablet, Take 2 tablets by mouth Every Night., Disp: 180 tablet, Rfl: 3  •  rivastigmine (EXELON) 13.3 MG/24HR patch, Place 1 patch on the skin as directed by provider Daily., Disp: 30 patch, Rfl: 11  •  sertraline (ZOLOFT) 50 MG tablet, Take 1 tablet by mouth Daily., Disp: 90 tablet, Rfl: 3  •  Cholecalciferol (VITAMIN D3) 5000 UNITS capsule capsule, Take 1 capsule by mouth Daily., Disp: , Rfl:   •  Cyanocobalamin (VITAMIN B-12) 500 MCG sublingual tablet, , Disp: , Rfl:   •  glucose blood test strip, 1 each by Other route Daily. Use as instructed, Disp: 100 each, Rfl: 1  •  levothyroxine (SYNTHROID, LEVOTHROID) 50 MCG tablet, TAKE 1 TABLET BY MOUTH DAILY, Disp: 90 tablet, Rfl: 0  •  losartan (COZAAR) 100 MG tablet, TAKE 1 TABLET BY MOUTH DAILY, Disp: 90 tablet, Rfl: 0  •  lovastatin (MEVACOR) 40 MG tablet, Take 1 tablet by mouth Every Night., Disp: 90 tablet, Rfl: 1  •  Multiple Vitamins-Minerals (PRESERVISION AREDS 2+MULTI VIT PO), , Disp: , Rfl:   •  SF 5000 PLUS 1.1 % cream, See Admin Instructions. (Patient not taking: Reported on 6/30/2023), Disp: , Rfl:      Review of Systems   Constitutional:  Positive for activity change.   Neurological:  Positive for weakness.   Psychiatric/Behavioral:  Positive for confusion.    All other systems reviewed and are negative.       Objective:  There were no vitals taken for this visit.  Not obtained d/t video visit    Neurologic Exam     Mental Status   Level of consciousness: sleeping on couch comfortably.      Physical Exam  Constitutional:       Appearance: Normal appearance.       COURTNEY fully d/t video visit  Resting comfortable on couch, asleep, good color in face, no acute  distress.    Assessment/Plan:       Diagnoses and all orders for this visit:    1. Severe late onset Alzheimer's dementia with mood disturbance (Primary)  -     memantine (NAMENDA) 10 MG tablet; Take 1 tablet by mouth 2 (Two) Times a Day.  Dispense: 180 tablet; Refill: 3  -     QUEtiapine (SEROquel) 25 MG tablet; Take 2 tablets by mouth Every Night.  Dispense: 180 tablet; Refill: 3  -     rivastigmine (EXELON) 13.3 MG/24HR patch; Place 1 patch on the skin as directed by provider Daily.  Dispense: 30 patch; Refill: 11  -     sertraline (ZOLOFT) 50 MG tablet; Take 1 tablet by mouth Daily.  Dispense: 90 tablet; Refill: 3  -     Ambulatory Referral to Home Health    2. Weakness  -     Ambulatory Referral to Home Health    3. Debility  -     Ambulatory Referral to Home Health         She has had decline in mobility. She is still eating okay, swallowing okay, and weight has been stable. Her  has severe idiopathic pulmonary fibrosis and is on oxygen 24/7, so we have recommended doing video visits moving forward as long as there are no big changes in her health. Since she is a DNR, they prefer conservative therapy, and we would not want to expose him to any type of infections with his immune system. I offered to order additional neuro workup, but due to the severity of her cognitive abilities, they kindly decline.    I have refilled her medications. We will order some home health services to come and work on the strength in her legs. They are not interested in doing additional imaging or aggressive therapies. If she were to decline significantly, have issues with dysphagia, weight loss, or other progression of symptoms, we can always consult with palliative care or hospice. Her , as well as the caregiver, verbalize understanding and agree with the plan today. She is sleeping comfortably on her couch, in no acute distress. We will complete follow-up via video visit on 07/01/2024 or sooner if needed.    Reviewed  medications, potential side effects and signs and symptoms to report. Discussed risk versus benefits of treatment plan with patient and/or family-including medications, labs and radiology that may be ordered. Addressed questions and concerns during visit. Patient and/or family verbalized understanding and agree with plan.    During this visit the following were done:  Labs Reviewed []    Labs Ordered []    Radiology Reports Reviewed []    Radiology Ordered []    PCP Records Reviewed []    Referring Provider Records Reviewed []    ER Records Reviewed []    Hospital Records Reviewed []    History Obtained From Family [x]   and caregiver  Radiology Images Reviewed []    Other Reviewed []    Records Requested []      Transcribed from ambient dictation for VALENTINA Henry by Deborah Benites.  02/08/24   13:00 EST    Patient or patient representative verbalized consent to the visit recording.  I have personally performed the services described in this document as transcribed by the above individual, and it is both accurate and complete.  VALENTINA Henry  2/9/2024  08:22 EST     Note to patient: The 21st Century Cures Act makes medical notes like these available to patients in the interest of transparency. However, be advised this is a medical document. It is intended as peer to peer communication. It is written in medical language and may contain abbreviations or verbiage that are unfamiliar. It may appear blunt or direct. Medical documents are intended to carry relevant information, facts as evident, and the clinical opinion of the provider.

## 2024-02-08 NOTE — PROGRESS NOTES
"Subjective:     Patient ID: Cassie Gomez is a 69 y.o. female.    CC:   Chief Complaint   Patient presents with    Alzheimer's Disease    Fatigue       HPI:   History of Present Illness  This visit was completed face to face via VIDEO by Epic/Aidee with verbal consent to treat obtained from patient and/or family.  Provider confirmed the patient's name and  at the start of visit. Patient and/or family confirms that they are located in [place] during visit and Provider is located in [facility] Clinic in [place], [place] during visit.    Today 2024-  This is a 69-year-old female completing video visit neurology follow-up on severe Alzheimer's disease present since 2016. Family provides history. She is currently on memantine, Exelon patch, Seroquel, as well as sertraline. She is with her  and caregiver during the video visit.    Today, her  reports she has had changes in her health. He states her legs are getting weaker, and they noticed yesterday, 2024, and today, 2024, her speech is somewhat slurred, which is a new thing for her. The other day, they forgot to administer her morning medication. He told their son to give her everything that night, so he may have given her too much medication. She is sleeping on the couch comfortably now he tells me.    Her caregiver states she is a lot weaker than she used to be, especially in her legs. She notes she cannot walk far before her legs \"give out\" on her. She has had several falls within the last month. The weakness started in her left leg, but it is now in her right leg. They have discussed this with her primary care provider. They are trying to get her a hospital bed to help with this. They would like home health to work on strengthening exercises with her. Her  states they are in the process of getting a new nurse practitioner to come to their home.     They confirm her appetite has been okay, and they have not noticed any " "significant weight loss. They report she \"babbles,\" but does not usually make sense. There are times they can understand what she is saying, but often times, it is random, and they do not understand what she is saying. She is sleeping well with the Seroquel.    Her caregiver reports her skin has been dry in some places, but it is good overall. She denies any skin breakdown.    Her caregiver confirms they have a living will or advanced directive for her. She is a DNR. Her  notes they prefer very conservative therapy.    Her  has idiopathic pulmonary fibrosis and wears oxygen at all times.    Prior Neurological Workup & History:  Alzheimer's Disease. Her  has noted symptoms since approximately mid-2016 marked largely by forgetfulness. This has gradually worsened over time. Additional associated symptoms have included impairments in orientation and executive function and now all spheres of cognition.  and caregiver manage all daily activities. She does not drive. She has minimal verbal skills.      Prior evaluation has included screening blood work and an MRI of the brain which were unremarkable.     Alzheimer's disease diagnosed around 2016. Her MMSE is untestable. She has been on a rivastigmine 13.3 mg patch. She has been on Seroquel 25 mg 2 pills every night and sertraline 50 mg daily. She is also on memantine 10 mg twice per day. Her  is her medical power of . She lives with her .     No history of drug use, alcohol, smoking, or cigarettes. Her father had dementia.   She has diabetes. She also has hypertension and hyperlipidemia. She is also on oral B12 supplementation and vitamin D supplementation.     Today, her  reports that her memory has worsened over the past year. He states that She is totally dependent on someone to help with bathing and daily hygiene. He states that she has urine and bowel incontinence but states that her skin has been okay and " intact.   Her father also had Alzheimer's or dementia and he was 87 years old when he . He states that she was initially diagnosed by Dr. Jean-Baptiste in 2016 with [facility].     The following portions of the patient's history were reviewed and updated as appropriate: allergies, current medications, past family history, past medical history, past social history, past surgical history, and problem list.    Past Medical History:   Diagnosis Date    Alzheimer disease     Colon polyp     Depression     Diabetes mellitus     H/O bone density study     normal    Memory loss     Thyroid disease     Thyroid enlargement        Past Surgical History:   Procedure Laterality Date    ADENOIDECTOMY       SECTION  1978    TONSILLECTOMY         Social History     Socioeconomic History    Marital status:    Tobacco Use    Smoking status: Never    Smokeless tobacco: Never   Vaping Use    Vaping Use: Never used   Substance and Sexual Activity    Alcohol use: No    Drug use: No    Sexual activity: Not Currently     Partners: Male     Birth control/protection: Post-menopausal       Family History   Problem Relation Age of Onset    Diabetes Mother     Heart disease Mother     Heart attack Mother     COPD Mother     Dementia Father     Diabetes Father     Stroke Father         In his Left eye    Squamous cell carcinoma Brother         oral          Current Outpatient Medications:     glipizide (GLUCOTROL) 5 MG tablet, Take 1 tablet by mouth Daily., Disp: , Rfl:     memantine (NAMENDA) 10 MG tablet, Take 1 tablet by mouth 2 (Two) Times a Day., Disp: 180 tablet, Rfl: 3    QUEtiapine (SEROquel) 25 MG tablet, Take 2 tablets by mouth Every Night., Disp: 180 tablet, Rfl: 3    rivastigmine (EXELON) 13.3 MG/24HR patch, Place 1 patch on the skin as directed by provider Daily., Disp: 30 patch, Rfl: 11    sertraline (ZOLOFT) 50 MG tablet, Take 1 tablet by mouth Daily., Disp: 90 tablet, Rfl: 3    Cholecalciferol (VITAMIN D3) 5000  UNITS capsule capsule, Take 1 capsule by mouth Daily., Disp: , Rfl:     Cyanocobalamin (VITAMIN B-12) 500 MCG sublingual tablet, , Disp: , Rfl:     glucose blood test strip, 1 each by Other route Daily. Use as instructed, Disp: 100 each, Rfl: 1    levothyroxine (SYNTHROID, LEVOTHROID) 50 MCG tablet, TAKE 1 TABLET BY MOUTH DAILY, Disp: 90 tablet, Rfl: 0    losartan (COZAAR) 100 MG tablet, TAKE 1 TABLET BY MOUTH DAILY, Disp: 90 tablet, Rfl: 0    lovastatin (MEVACOR) 40 MG tablet, Take 1 tablet by mouth Every Night., Disp: 90 tablet, Rfl: 1    Multiple Vitamins-Minerals (PRESERVISION AREDS 2+MULTI VIT PO), , Disp: , Rfl:     SF 5000 PLUS 1.1 % cream, See Admin Instructions. (Patient not taking: Reported on 6/30/2023), Disp: , Rfl:      Review of Systems   Constitutional:  Positive for activity change.   Neurological:  Positive for weakness.   Psychiatric/Behavioral:  Positive for confusion.    All other systems reviewed and are negative.       Objective:  There were no vitals taken for this visit.  Not obtained d/t video visit    Neurologic Exam     Mental Status   Level of consciousness: sleeping on couch comfortably.      Physical Exam  Constitutional:       Appearance: Normal appearance.       COURTNEY fully d/t video visit  Resting comfortable on couch, asleep, good color in face, no acute distress.    Assessment/Plan:       Diagnoses and all orders for this visit:    1. Severe late onset Alzheimer's dementia with mood disturbance (Primary)  -     memantine (NAMENDA) 10 MG tablet; Take 1 tablet by mouth 2 (Two) Times a Day.  Dispense: 180 tablet; Refill: 3  -     QUEtiapine (SEROquel) 25 MG tablet; Take 2 tablets by mouth Every Night.  Dispense: 180 tablet; Refill: 3  -     rivastigmine (EXELON) 13.3 MG/24HR patch; Place 1 patch on the skin as directed by provider Daily.  Dispense: 30 patch; Refill: 11  -     sertraline (ZOLOFT) 50 MG tablet; Take 1 tablet by mouth Daily.  Dispense: 90 tablet; Refill: 3  -      Ambulatory Referral to Home Health    2. Weakness  -     Ambulatory Referral to Home Health    3. Debility  -     Ambulatory Referral to Home Health         She has had decline in mobility. She is still eating okay, swallowing okay, and weight has been stable. Her  has severe idiopathic pulmonary fibrosis and is on oxygen 24/7, so we have recommended doing video visits moving forward as long as there are no big changes in her health. Since she is a DNR, they prefer conservative therapy, and we would not want to expose him to any type of infections with his immune system. I offered to order additional neuro workup, but due to the severity of her cognitive abilities, they kindly decline.    I have refilled her medications. We will order some home health services to come and work on the strength in her legs. They are not interested in doing additional imaging or aggressive therapies. If she were to decline significantly, have issues with dysphagia, weight loss, or other progression of symptoms, we can always consult with palliative care or hospice. Her , as well as the caregiver, verbalize understanding and agree with the plan today. She is sleeping comfortably on her couch, in no acute distress. We will complete follow-up via video visit on 07/01/2024 or sooner if needed.    Reviewed medications, potential side effects and signs and symptoms to report. Discussed risk versus benefits of treatment plan with patient and/or family-including medications, labs and radiology that may be ordered. Addressed questions and concerns during visit. Patient and/or family verbalized understanding and agree with plan.    During this visit the following were done:  Labs Reviewed []    Labs Ordered []    Radiology Reports Reviewed []    Radiology Ordered []    PCP Records Reviewed []    Referring Provider Records Reviewed []    ER Records Reviewed []    Hospital Records Reviewed []    History Obtained From Family [x]    and caregiver  Radiology Images Reviewed []    Other Reviewed []    Records Requested []      Transcribed from ambient dictation for Lila Gonzalez VALENTINA Fall by Deborah Benites.  02/08/24   13:00 EST    Patient or patient representative verbalized consent to the visit recording.  I have personally performed the services described in this document as transcribed by the above individual, and it is both accurate and complete.  Lila Gonzalez VALENTINA Fall  2/9/2024  08:22 EST     Note to patient: The 21st Century Cures Act makes medical notes like these available to patients in the interest of transparency. However, be advised this is a medical document. It is intended as peer to peer communication. It is written in medical language and may contain abbreviations or verbiage that are unfamiliar. It may appear blunt or direct. Medical documents are intended to carry relevant information, facts as evident, and the clinical opinion of the provider.

## 2024-02-09 ENCOUNTER — HOSPITAL ENCOUNTER (EMERGENCY)
Facility: HOSPITAL | Age: 69
Discharge: HOME | End: 2024-02-09
Attending: EMERGENCY MEDICINE
Payer: MEDICARE

## 2024-02-09 ENCOUNTER — PATIENT ROUNDING (BHMG ONLY) (OUTPATIENT)
Dept: NEUROLOGY | Facility: CLINIC | Age: 69
End: 2024-02-09
Payer: MEDICARE

## 2024-02-09 ENCOUNTER — APPOINTMENT (OUTPATIENT)
Dept: GENERAL RADIOLOGY | Facility: HOSPITAL | Age: 69
End: 2024-02-09
Payer: MEDICARE

## 2024-02-09 VITALS
OXYGEN SATURATION: 96 % | WEIGHT: 190 LBS | HEIGHT: 66 IN | RESPIRATION RATE: 16 BRPM | HEART RATE: 82 BPM | SYSTOLIC BLOOD PRESSURE: 149 MMHG | BODY MASS INDEX: 30.53 KG/M2 | DIASTOLIC BLOOD PRESSURE: 69 MMHG | TEMPERATURE: 98.1 F

## 2024-02-09 DIAGNOSIS — N30.00 ACUTE CYSTITIS WITHOUT HEMATURIA: Primary | ICD-10-CM

## 2024-02-09 LAB
ALBUMIN SERPL-MCNC: 3.5 G/DL (ref 3.5–5.2)
ALBUMIN/GLOB SERPL: 1 G/DL
ALP SERPL-CCNC: 92 U/L (ref 39–117)
ALT SERPL W P-5'-P-CCNC: 6 U/L (ref 1–33)
ANION GAP SERPL CALCULATED.3IONS-SCNC: 11.5 MMOL/L (ref 5–15)
AST SERPL-CCNC: 21 U/L (ref 1–32)
BACTERIA UR QL AUTO: ABNORMAL /HPF
BASOPHILS # BLD AUTO: 0.07 10*3/MM3 (ref 0–0.2)
BASOPHILS NFR BLD AUTO: 0.5 % (ref 0–1.5)
BILIRUB SERPL-MCNC: 0.6 MG/DL (ref 0–1.2)
BILIRUB UR QL STRIP: NEGATIVE
BUN SERPL-MCNC: 23 MG/DL (ref 8–23)
BUN/CREAT SERPL: 14.4 (ref 7–25)
CALCIUM SPEC-SCNC: 9.1 MG/DL (ref 8.6–10.5)
CHLORIDE SERPL-SCNC: 106 MMOL/L (ref 98–107)
CLARITY UR: ABNORMAL
CO2 SERPL-SCNC: 24.5 MMOL/L (ref 22–29)
COLOR UR: YELLOW
CREAT SERPL-MCNC: 1.6 MG/DL (ref 0.57–1)
D-LACTATE SERPL-SCNC: 0.9 MMOL/L (ref 0.5–2)
DEPRECATED RDW RBC AUTO: 44.8 FL (ref 37–54)
EGFRCR SERPLBLD CKD-EPI 2021: 34.6 ML/MIN/1.73
EOSINOPHIL # BLD AUTO: 0.06 10*3/MM3 (ref 0–0.4)
EOSINOPHIL NFR BLD AUTO: 0.5 % (ref 0.3–6.2)
ERYTHROCYTE [DISTWIDTH] IN BLOOD BY AUTOMATED COUNT: 13.8 % (ref 12.3–15.4)
GLOBULIN UR ELPH-MCNC: 3.6 GM/DL
GLUCOSE SERPL-MCNC: 102 MG/DL (ref 65–99)
GLUCOSE UR STRIP-MCNC: ABNORMAL MG/DL
HCT VFR BLD AUTO: 40.9 % (ref 34–46.6)
HGB BLD-MCNC: 13 G/DL (ref 12–15.9)
HGB UR QL STRIP.AUTO: ABNORMAL
HYALINE CASTS UR QL AUTO: ABNORMAL /LPF
IMM GRANULOCYTES # BLD AUTO: 0.06 10*3/MM3 (ref 0–0.05)
IMM GRANULOCYTES NFR BLD AUTO: 0.5 % (ref 0–0.5)
KETONES UR QL STRIP: ABNORMAL
LEUKOCYTE ESTERASE UR QL STRIP.AUTO: ABNORMAL
LYMPHOCYTES # BLD AUTO: 2.2 10*3/MM3 (ref 0.7–3.1)
LYMPHOCYTES NFR BLD AUTO: 16.8 % (ref 19.6–45.3)
MAGNESIUM SERPL-MCNC: 2.2 MG/DL (ref 1.6–2.4)
MCH RBC QN AUTO: 28.1 PG (ref 26.6–33)
MCHC RBC AUTO-ENTMCNC: 31.8 G/DL (ref 31.5–35.7)
MCV RBC AUTO: 88.3 FL (ref 79–97)
MONOCYTES # BLD AUTO: 1.35 10*3/MM3 (ref 0.1–0.9)
MONOCYTES NFR BLD AUTO: 10.3 % (ref 5–12)
NEUTROPHILS NFR BLD AUTO: 71.4 % (ref 42.7–76)
NEUTROPHILS NFR BLD AUTO: 9.36 10*3/MM3 (ref 1.7–7)
NITRITE UR QL STRIP: NEGATIVE
NRBC BLD AUTO-RTO: 0 /100 WBC (ref 0–0.2)
PH UR STRIP.AUTO: 5.5 [PH] (ref 5–8)
PLATELET # BLD AUTO: 274 10*3/MM3 (ref 140–450)
PMV BLD AUTO: 10.2 FL (ref 6–12)
POTASSIUM SERPL-SCNC: 4.3 MMOL/L (ref 3.5–5.2)
PROT SERPL-MCNC: 7.1 G/DL (ref 6–8.5)
PROT UR QL STRIP: ABNORMAL
RBC # BLD AUTO: 4.63 10*6/MM3 (ref 3.77–5.28)
RBC # UR STRIP: ABNORMAL /HPF
REF LAB TEST METHOD: ABNORMAL
SODIUM SERPL-SCNC: 142 MMOL/L (ref 136–145)
SP GR UR STRIP: 1.02 (ref 1–1.03)
SQUAMOUS #/AREA URNS HPF: ABNORMAL /HPF
UROBILINOGEN UR QL STRIP: ABNORMAL
WBC # UR STRIP: ABNORMAL /HPF
WBC NRBC COR # BLD AUTO: 13.1 10*3/MM3 (ref 3.4–10.8)

## 2024-02-09 PROCEDURE — 71045 X-RAY EXAM CHEST 1 VIEW: CPT

## 2024-02-09 PROCEDURE — 96365 THER/PROPH/DIAG IV INF INIT: CPT

## 2024-02-09 PROCEDURE — 25010000002 CEFTRIAXONE SODIUM-DEXTROSE 2-2.22 GM-%(50ML) RECONSTITUTED SOLUTION: Performed by: EMERGENCY MEDICINE

## 2024-02-09 PROCEDURE — 36415 COLL VENOUS BLD VENIPUNCTURE: CPT

## 2024-02-09 PROCEDURE — 83605 ASSAY OF LACTIC ACID: CPT | Performed by: EMERGENCY MEDICINE

## 2024-02-09 PROCEDURE — 87077 CULTURE AEROBIC IDENTIFY: CPT | Performed by: EMERGENCY MEDICINE

## 2024-02-09 PROCEDURE — 99283 EMERGENCY DEPT VISIT LOW MDM: CPT

## 2024-02-09 PROCEDURE — 85025 COMPLETE CBC W/AUTO DIFF WBC: CPT | Performed by: EMERGENCY MEDICINE

## 2024-02-09 PROCEDURE — 81001 URINALYSIS AUTO W/SCOPE: CPT | Performed by: EMERGENCY MEDICINE

## 2024-02-09 PROCEDURE — 25810000003 SODIUM CHLORIDE 0.9 % SOLUTION: Performed by: EMERGENCY MEDICINE

## 2024-02-09 PROCEDURE — 87086 URINE CULTURE/COLONY COUNT: CPT | Performed by: EMERGENCY MEDICINE

## 2024-02-09 PROCEDURE — 83735 ASSAY OF MAGNESIUM: CPT | Performed by: EMERGENCY MEDICINE

## 2024-02-09 PROCEDURE — 80053 COMPREHEN METABOLIC PANEL: CPT | Performed by: EMERGENCY MEDICINE

## 2024-02-09 PROCEDURE — 87186 SC STD MICRODIL/AGAR DIL: CPT | Performed by: EMERGENCY MEDICINE

## 2024-02-09 RX ORDER — SODIUM CHLORIDE 0.9 % (FLUSH) 0.9 %
10 SYRINGE (ML) INJECTION AS NEEDED
Status: DISCONTINUED | OUTPATIENT
Start: 2024-02-09 | End: 2024-02-09 | Stop reason: HOSPADM

## 2024-02-09 RX ORDER — CEFDINIR 300 MG/1
300 CAPSULE ORAL DAILY
Qty: 7 CAPSULE | Refills: 0 | Status: SHIPPED | OUTPATIENT
Start: 2024-02-09 | End: 2024-02-16

## 2024-02-09 RX ORDER — CEFTRIAXONE 2 G/50ML
2000 INJECTION, SOLUTION INTRAVENOUS ONCE
Status: COMPLETED | OUTPATIENT
Start: 2024-02-09 | End: 2024-02-09

## 2024-02-09 RX ADMIN — SODIUM CHLORIDE 1000 ML: 9 INJECTION, SOLUTION INTRAVENOUS at 18:20

## 2024-02-09 RX ADMIN — CEFTRIAXONE 2000 MG: 2 INJECTION, SOLUTION INTRAVENOUS at 19:38

## 2024-02-09 NOTE — PROGRESS NOTES
A My-Chart message has been sent to the patient for PATIENT ROUNDING with [facility]

## 2024-02-10 NOTE — ED PROVIDER NOTES
EMERGENCY DEPARTMENT ENCOUNTER    Pt Name: Cassie Gomez  MRN: [id number]  Pt :   1955  Room Number:    Date of encounter:  2024  PCP: Shannan Braden MD  ED Provider: Franklyn Petersen MD    Historian: Family and EMS      HPI:  Chief Complaint   Patient presents with    Weakness - Generalized          Context: Cassie Gomez is a 69 y.o. female who presents to the ED c/o generalized weakness, sent in by family for lethargy.  Patient has a history of Alzheimer disease is nonverbal.  Patient unable V[place]is history on arrival.      PAST MEDICAL HISTORY  Past Medical History:   Diagnosis Date    Alzheimer disease     Colon polyp     Depression     Diabetes mellitus     Essential hypertension 2018    H/O bone density study     normal    Memory loss     Mixed hyperlipidemia 2017    Thyroid disease     Thyroid enlargement          PAST SURGICAL HISTORY  Past Surgical History:   Procedure Laterality Date    ADENOIDECTOMY       SECTION  1978    TONSILLECTOMY           FAMILY HISTORY  Family History   Problem Relation Age of Onset    Diabetes Mother     Heart disease Mother     Heart attack Mother     COPD Mother     Dementia Father     Diabetes Father     Stroke Father         In his Left eye    Squamous cell carcinoma Brother         oral         SOCIAL HISTORY  Social History     Socioeconomic History    Marital status:    Tobacco Use    Smoking status: Never    Smokeless tobacco: Never   Vaping Use    Vaping Use: Never used   Substance and Sexual Activity    Alcohol use: No    Drug use: No    Sexual activity: Not Currently     Partners: Male     Birth control/protection: Post-menopausal         ALLERGIES  Bee venom, Ace inhibitors, and Venlafaxine        REVIEW OF SYSTEMS  Review of Systems   Unable to perform ROS: Dementia        All systems reviewed and negative except for those discussed in HPI.       PHYSICAL EXAM    I have reviewed the triage  vital signs and nursing notes.    ED Triage Vitals [02/09/24 1647]   Temp Heart Rate Resp BP SpO2   98.1 °F (36.7 °C) 66 14 124/63 94 %      Temp src Heart Rate Source Patient Position BP Location FiO2 (%)   Oral Monitor Lying Left arm --       Physical Exam  Constitutional:       Appearance: Normal appearance. She is not ill-appearing.   HENT:      Head: Normocephalic and atraumatic.      Right Ear: External ear normal.      Left Ear: External ear normal.      Nose: Nose normal.      Mouth/Throat:      Mouth: Mucous membranes are moist.      Pharynx: Oropharynx is clear.   Eyes:      Extraocular Movements: Extraocular movements intact.      Conjunctiva/sclera: Conjunctivae normal.      Pupils: Pupils are equal, round, and reactive to light.   Cardiovascular:      Rate and Rhythm: Normal rate and regular rhythm.      Pulses:           Radial pulses are 2+ on the right side and 2+ on the left side.   Pulmonary:      Effort: Pulmonary effort is normal.      Breath sounds: Normal breath sounds.   Abdominal:      General: There is no distension.      Palpations: Abdomen is soft.      Tenderness: There is no abdominal tenderness.   Musculoskeletal:         General: No tenderness or deformity. Normal range of motion.      Cervical back: Normal range of motion and neck supple.      Right lower leg: No edema.      Left lower leg: No edema.   Skin:     General: Skin is warm and dry.      Capillary Refill: Capillary refill takes less than 2 seconds.   Neurological:      General: No focal deficit present.      Mental Status: She is alert. Mental status is at baseline.            LAB RESULTS  Recent Results (from the past 24 hour(s))   Comprehensive Metabolic Panel    Collection Time: 02/09/24  5:25 PM    Specimen: Blood   Result Value Ref Range    Glucose 102 (H) 65 - 99 mg/dL    BUN 23 8 - 23 mg/dL    Creatinine 1.60 (H) 0.57 - 1.00 mg/dL    Sodium 142 136 - 145 mmol/L    Potassium 4.3 3.5 - 5.2 mmol/L    Chloride 106 98 -  107 mmol/L    CO2 24.5 22.0 - 29.0 mmol/L    Calcium 9.1 8.6 - 10.5 mg/dL    Total Protein 7.1 6.0 - 8.5 g/dL    Albumin 3.5 3.5 - 5.2 g/dL    ALT (SGPT) 6 1 - 33 U/L    AST (SGOT) 21 1 - 32 U/L    Alkaline Phosphatase 92 39 - 117 U/L    Total Bilirubin 0.6 0.0 - 1.2 mg/dL    Globulin 3.6 gm/dL    A/G Ratio 1.0 g/dL    BUN/Creatinine Ratio 14.4 7.0 - 25.0    Anion Gap 11.5 5.0 - 15.0 mmol/L    eGFR 34.6 (L) >60.0 mL/min/1.73   Lactic Acid, Plasma    Collection Time: 02/09/24  5:25 PM    Specimen: Blood   Result Value Ref Range    Lactate 0.9 0.5 - 2.0 mmol/L   Magnesium    Collection Time: 02/09/24  5:25 PM    Specimen: Blood   Result Value Ref Range    Magnesium 2.2 1.6 - 2.4 mg/dL   CBC Auto Differential    Collection Time: 02/09/24  5:25 PM    Specimen: Blood   Result Value Ref Range    WBC 13.10 (H) 3.40 - 10.80 10*3/mm3    RBC 4.63 3.77 - 5.28 10*6/mm3    Hemoglobin 13.0 12.0 - 15.9 g/dL    Hematocrit 40.9 34.0 - 46.6 %    MCV 88.3 79.0 - 97.0 fL    MCH 28.1 26.6 - 33.0 pg    MCHC 31.8 31.5 - 35.7 g/dL    RDW 13.8 12.3 - 15.4 %    RDW-SD 44.8 37.0 - 54.0 fl    MPV 10.2 6.0 - 12.0 fL    Platelets 274 140 - 450 10*3/mm3    Neutrophil % 71.4 42.7 - 76.0 %    Lymphocyte % 16.8 (L) 19.6 - 45.3 %    Monocyte % 10.3 5.0 - 12.0 %    Eosinophil % 0.5 0.3 - 6.2 %    Basophil % 0.5 0.0 - 1.5 %    Immature Grans % 0.5 0.0 - 0.5 %    Neutrophils, Absolute 9.36 (H) 1.70 - 7.00 10*3/mm3    Lymphocytes, Absolute 2.20 0.70 - 3.10 10*3/mm3    Monocytes, Absolute 1.35 (H) 0.10 - 0.90 10*3/mm3    Eosinophils, Absolute 0.06 0.00 - 0.40 10*3/mm3    Basophils, Absolute 0.07 0.00 - 0.20 10*3/mm3    Immature Grans, Absolute 0.06 (H) 0.00 - 0.05 10*3/mm3    nRBC 0.0 0.0 - 0.2 /100 WBC   Urinalysis With Culture If Indicated - Urine, Clean Catch    Collection Time: 02/09/24  5:30 PM    Specimen: Urine, Clean Catch   Result Value Ref Range    Color, UA Yellow Yellow, Straw    Appearance, UA Cloudy (A) Clear    pH, UA 5.5 5.0 - 8.0     Specific Gravity, UA 1.019 1.005 - 1.030    Glucose,  mg/dL (Trace) (A) Negative    Ketones, UA Trace (A) Negative    Bilirubin, UA Negative Negative    Blood, UA Moderate (2+) (A) Negative    Protein,  mg/dL (2+) (A) Negative    Leuk Esterase, UA Small (1+) (A) Negative    Nitrite, UA Negative Negative    Urobilinogen, UA 0.2 E.U./dL 0.2 - 1.0 E.U./dL   Urinalysis, Microscopic Only - Urine, Clean Catch    Collection Time: 02/09/24  5:30 PM    Specimen: Urine, Clean Catch   Result Value Ref Range    RBC, UA 6-10 (A) None Seen, 0-2 /HPF    WBC, UA 6-10 (A) None Seen, 0-2 /HPF    Bacteria, UA 3+ (A) None Seen /HPF    Squamous Epithelial Cells, UA 3-6 (A) None Seen, 0-2 /HPF    Hyaline Casts, UA None Seen None Seen /LPF    Methodology Manual Light Microscopy        If labs were ordered, I independently reviewed the results and considered them in treating the patient.        RADIOLOGY  No Radiology Exams Resulted Within Past 24 Hours        PROCEDURES    Procedures    Interpretations    O2 Sat: The patients oxygen saturation was 94% on Room Air.  This was independently interpreted by me as Normal      Cardiac Monitoring: I reviewed and independently interpreted the Rhythm Strip as Normal Sinus rhythm rate of 66    Radiology: I ordered and independently reviewed the above noted radiographic studies.  I viewed images of chest x-ray which showed no focal pneumonia per my independent interpretation. See radiologist's dictation for official interpretation.         MEDICATIONS GIVEN IN ER    Medications   sodium chloride 0.9 % flush 10 mL (has no administration in time range)   cefTRIAXone (ROCEPHIN) IVPB 2000 mg/50ml dextrose (premix) (2,000 mg Intravenous New Bag 2/9/24 1938)   sodium chloride 0.9 % bolus 1,000 mL (0 mL Intravenous Stopped 2/9/24 1850)         MEDICAL DECISION MAKING, PROGRESS, and CONSULTS    All labs, if obtained, have been independently reviewed by me.  All radiology studies, if obtained,  have been reviewed by me and the radiologist dictating the report.  All EKG's, if obtained, have been independently viewed and interpreted by me/my attending physician.      Discussion below represents my analysis of pertinent findings related to patient's condition, differential diagnosis, treatment plan and final disposition.      Differential diagnosis:    Well-appearing 69-year-old female with history of Alzheimer's presents to the ED with lethargy and weakness according to family.  They were not immediately available at the bedside, concern for UTI given the patient's age, electrolyte [place], dehydration.  Will obtain laboratory workup, urinalysis, chest x-ray    Chronic or social conditions impacting care:  Nonverbal, Alzheimer's      Orders placed during this visit:  Orders Placed This Encounter   Procedures    Urine Culture - Urine,    XR Chest 1 View    Comprehensive Metabolic Panel    Lactic Acid, Plasma    Urinalysis With Culture If Indicated - Urine, Clean Catch    Magnesium    CBC Auto Differential    Urinalysis, Microscopic Only - Urine, Clean Catch    Insert Peripheral IV    CBC & Differential         Additional orders considered but not ordered:      ED Course:    Consultants:  None    ED Course as of 02/09/24 1940 Fri Feb 09, 2024 1920 Urine is grossly infected, white blood cell count mildly elevated, the rest of the labs are fairly benign.  Lactic acid was negative.  Patient given IV ceftriaxone, will be discharged home with antibiotics for UTI. [CS]      ED Course User Index  [CS] Franklyn Petersen MD           After my consideration of clinical presentation and any laboratory/radiology studies obtained, I discussed the findings with the patient/patient representative who is in agreement with the treatment plan and the final disposition. Risks and benefits of discharge were discussed.     AS OF 19:40 EST VITALS:    BP - 124/63  HR - 66  TEMP - 98.1 °F (36.7 °C) (Oral)  O2 SATS -  94%    I reviewed the patients prescription monitoring report if available prior to discharge    DIAGNOSIS  Final diagnoses:   Acute cystitis without hematuria         DISPOSITION  ED Disposition       ED Disposition   Discharge    Condition   Stable    Comment   --                   Please note that portions of this document were completed with voice recognition software.        Franklyn Petersen MD  02/09/24 4313

## 2024-02-11 LAB — BACTERIA SPEC AEROBE CULT: ABNORMAL

## 2024-02-19 ENCOUNTER — HOME HEALTH ADMISSION (OUTPATIENT)
Dept: HOME HEALTH SERVICES | Facility: HOME HEALTHCARE | Age: 69
End: 2024-02-19
Payer: MEDICARE

## 2024-02-26 ENCOUNTER — DOCUMENTATION (OUTPATIENT)
Dept: FAMILY MEDICINE CLINIC | Facility: CLINIC | Age: 69
End: 2024-02-26
Payer: MEDICARE

## 2024-02-27 RX ORDER — BISACODYL 10 MG
10 SUPPOSITORY, RECTAL RECTAL 2 TIMES DAILY PRN
COMMUNITY

## 2024-02-27 RX ORDER — SENNOSIDES A AND B 8.6 MG/1
1 TABLET, FILM COATED ORAL 2 TIMES DAILY PRN
COMMUNITY

## 2024-03-06 ENCOUNTER — NURSING HOME (OUTPATIENT)
Dept: FAMILY MEDICINE CLINIC | Facility: CLINIC | Age: 69
End: 2024-03-06
Payer: MEDICARE

## 2024-03-06 VITALS
TEMPERATURE: 97.2 F | OXYGEN SATURATION: 97 % | BODY MASS INDEX: 31.41 KG/M2 | SYSTOLIC BLOOD PRESSURE: 133 MMHG | RESPIRATION RATE: 18 BRPM | WEIGHT: 194.6 LBS | DIASTOLIC BLOOD PRESSURE: 66 MMHG | HEART RATE: 78 BPM

## 2024-03-06 DIAGNOSIS — E11.9 TYPE 2 DIABETES MELLITUS WITHOUT COMPLICATION, WITHOUT LONG-TERM CURRENT USE OF INSULIN: ICD-10-CM

## 2024-03-06 DIAGNOSIS — Z78.9 IMPAIRED MOBILITY AND ADLS: Chronic | ICD-10-CM

## 2024-03-06 DIAGNOSIS — Z74.09 IMPAIRED MOBILITY AND ADLS: Chronic | ICD-10-CM

## 2024-03-06 DIAGNOSIS — E78.2 MIXED HYPERLIPIDEMIA: ICD-10-CM

## 2024-03-06 DIAGNOSIS — I10 ESSENTIAL HYPERTENSION: ICD-10-CM

## 2024-03-06 DIAGNOSIS — F02.C3 SEVERE LATE ONSET ALZHEIMER'S DEMENTIA WITH MOOD DISTURBANCE: ICD-10-CM

## 2024-03-06 DIAGNOSIS — G30.1 SEVERE LATE ONSET ALZHEIMER'S DEMENTIA WITH MOOD DISTURBANCE: ICD-10-CM

## 2024-03-06 DIAGNOSIS — E03.9 ACQUIRED HYPOTHYROIDISM: ICD-10-CM

## 2024-03-06 DIAGNOSIS — R54 AGE-RELATED PHYSICAL DEBILITY: Primary | ICD-10-CM

## 2024-03-06 NOTE — LETTER
Nursing Home History/Physical        Mariano Gordon DO [x]   [facility] []  [address] [place] [address]  Phone: [phone]  Fax: [phone] Mabel Aiken MD []  Aaron Ferreira DO []  [address] [place] [address]  Phone: [phone]  Fax: [phone]     PATIENT NAME: Cassie Gomez                                                                          YOB: 1955           DATE OF SERVICE: 03/06/2024  FACILITY:  []  [facility]  []  [facility]   [x]  [facility]   [] [facility]    [] Other ______________________________________________________________________     CHIEF COMPLAINT:  Impaired mobility and ADLs/advanced dementia, Alzheimer's/hypothyroidism/hypertension/diabetes mellitus/dyslipidemia/age-related physical debility and      HISTORY OF PRESENT ILLNESS:      [x]  Initial visit for coordination of long term care issues and chronic medical management needs.    Patient is a 69-year-old female who is recently transition to facility age-related physical debility and impairment of mobility and ADLs.  She has severe late onset Alzheimer's dementia with mood disturbance, as well as other chronic comorbid conditions including hypothyroidism, hypertension, diabetes mellitus and dyslipidemia.    She is in need of skilled nursing facility services, as well to aid in her mobilization/transfers, as well as ADLs of need.    No nutritional/hydration deficits reported.  No acute behavioral changes, she does appear to be acclimating well to the facility.    Vital signs have been stable, course of cyclical/persistent hypoglycemia    PAST MEDICAL & SURGICAL HISTORY:   Past Medical History:   Diagnosis Date    Alzheimer disease     Colon polyp     Depression     Diabetes mellitus 2000    Essential hypertension 05/24/2018    H/O bone density study 2011    normal    Memory loss     Mixed hyperlipidemia 07/06/2017    Thyroid disease     Thyroid enlargement       Past Surgical  History:   Procedure Laterality Date    ADENOIDECTOMY       SECTION  1978    TONSILLECTOMY           MEDICATIONS:  I have reviewed and reconciled the patients medication list in the patients chart at the skilled nursing facility today.      ALLERGIES:    Allergies   Allergen Reactions    Bee Venom Anaphylaxis    Ace Inhibitors Cough    Venlafaxine Other (See Comments)     insomnia         SOCIAL HISTORY:    Social History     Socioeconomic History    Marital status:    Tobacco Use    Smoking status: Never    Smokeless tobacco: Never   Vaping Use    Vaping status: Never Used   Substance and Sexual Activity    Alcohol use: No    Drug use: No    Sexual activity: Not Currently     Partners: Male     Birth control/protection: Post-menopausal       FAMILY HISTORY:    Family History   Problem Relation Age of Onset    Diabetes Mother     Heart disease Mother     Heart attack Mother     COPD Mother     Dementia Father     Diabetes Father     Stroke Father         In his Left eye    Squamous cell carcinoma Brother         oral       REVIEW OF SYSTEMS:    Review of Systems  Appetite: Fair [x]   Good []   Poor []   Weight Loss []  [x]  Weight Stable   Unavoidable Weight Loss []  Tolerating Tube Feeding []    Supplements Provided []   Patient is a poor historian given her advanced dementia, review of systems is obtained on direct consultation with her treating nurse/staff, as well as review of records.    PHYSICAL EXAMINATION:   VITAL SIGNS:   Vitals:    24 0818   BP: 133/66   Pulse: 78   Resp: 18   Temp: 97.2 °F (36.2 °C)   SpO2: 97%         Physical Exam  General Appearance:  [x]  Alert   [x]  Oriented x person  [x]  No acute distress     [x]  Confused  []  Disoriented   []  Comatose   Head:  Atraumatic and normocephalic, without obvious abnormality.   Eyes:         PERRLA, conjunctivae and sclerae normal, no Icterus. No pallor. Extra-occular movements are within normal limits.   Ears:  Ears appear  intact with no abnormalities noted.   Throat: No oral lesions, no thrush, oral mucosa moist.   Neck: Supple, trachea midline, no thyromegaly, no carotid bruit.   Back:   No kyphoscoliosis. No tenderness to palpation.   Lungs:   Chest shape is normal. Breath sounds heard bilaterally equally.  No wheezing.  Audible air exchange noted all lung fields.   Heart:  Normal S1 and S2, no murmur, no gallop, no rub. No JVD.   Abdomen:   Normal bowel sounds, no masses, no organomegaly. Soft, non-tender, non-distended, no guarding    Extremities: Moves all extremities; without edema, cyanosis or clubbing.  Frail build.  Poor core strength/stability.   Pulses: Pulses palpable and equal bilaterally.   Skin: No bleeding or rash.  Generalized dry skin noted.  Age-related atrophy of skin.   Neurologic: [x] Normal speech []  Normal mental status    [x] Cranial nerves II through XII intact   [x]  No anosmia [x]  DTR 2+ [x]  Proprioception intact  [x]  No focal motor/sensory deficits      Psych/Mood:                    [x]  No acute changes []  Depressed      Urinary:      [x]  Continent  [x]  Incontinent, at times []  Retention  []  F/C     []  UTI w/treatment in progress      ASSESSMENT     Diagnoses and all orders for this visit:    1. Age-related physical debility (Primary)    2. Impaired mobility and ADLs    3. Acquired hypothyroidism    4. Severe late onset Alzheimer's dementia with mood disturbance    5. Essential hypertension    6. Type 2 diabetes mellitus without complication, without long-term current use of insulin    7. Mixed hyperlipidemia        PLAN  Planned utilization of skilled nursing facility services to aid in mobilization/transfers, as well as all ADLs of need.  Continue to follow nutritional/hydration status, no deficits reported at this time.    Continue blood glucose monitoring, future changes to treatment regimen based on need for maximizing blood glucose control and minimizing hypoglycemic episodes.    Vital  signs demonstrate hemodynamic stability, blood pressure is at goal.    Continue thyroid supplementation, periodic thyroid function studies with dose adjustment when indicated.    Surveillance labs when needed.      [x]  Discussed Patient in detail with nursing/staff, addressed all needs today.     [x]  Plan of Care Reviewed   [x]  PT/OT Reviewed   []  Order Changes  []  Discharge Plans Reviewed  []  Code Status Change        I spent 50 minutes caring for Cassie on this date of service. This time includes time spent by me in the following activities:preparing for the visit, reviewing tests, obtaining and/or reviewing a separately obtained history, performing a medically appropriate examination and/or evaluation , counseling and educating the patient/family/caregiver, ordering medications, tests, or procedures, documenting information in the medical record, and care coordination    Mariano Gordon   03/06/2024

## 2024-03-25 PROBLEM — Z78.9 IMPAIRED MOBILITY AND ADLS: Chronic | Status: ACTIVE | Noted: 2024-03-25

## 2024-03-25 PROBLEM — R54 AGE-RELATED PHYSICAL DEBILITY: Status: ACTIVE | Noted: 2024-02-08

## 2024-03-25 PROBLEM — Z74.09 IMPAIRED MOBILITY AND ADLS: Chronic | Status: ACTIVE | Noted: 2024-03-25

## 2024-03-26 NOTE — PROGRESS NOTES
Nursing Home History/Physical        Mariano Gordon DO [x]   [facility] []  [address] [place] [address]  Phone: [phone]  Fax: [phone] Mabel Aiken MD []  Aaron Ferreira DO []  [address] [place] [address]  Phone: [phone]  Fax: [phone]     PATIENT NAME: Cassie Gomez                                                                          YOB: 1955           DATE OF SERVICE: 03/06/2024  FACILITY:  []  [facility]  []  [facility]   [x]  [facility]   [] [facility]    [] Other ______________________________________________________________________     CHIEF COMPLAINT:  Impaired mobility and ADLs/advanced dementia, Alzheimer's/hypothyroidism/hypertension/diabetes mellitus/dyslipidemia/age-related physical debility and      HISTORY OF PRESENT ILLNESS:      [x]  Initial visit for coordination of long term care issues and chronic medical management needs.    Patient is a 69-year-old female who is recently transition to facility age-related physical debility and impairment of mobility and ADLs.  She has severe late onset Alzheimer's dementia with mood disturbance, as well as other chronic comorbid conditions including hypothyroidism, hypertension, diabetes mellitus and dyslipidemia.    She is in need of skilled nursing facility services, as well to aid in her mobilization/transfers, as well as ADLs of need.    No nutritional/hydration deficits reported.  No acute behavioral changes, she does appear to be acclimating well to the facility.    Vital signs have been stable, course of cyclical/persistent hypoglycemia    PAST MEDICAL & SURGICAL HISTORY:   Past Medical History:   Diagnosis Date    Alzheimer disease     Colon polyp     Depression     Diabetes mellitus 2000    Essential hypertension 05/24/2018    H/O bone density study 2011    normal    Memory loss     Mixed hyperlipidemia 07/06/2017    Thyroid disease     Thyroid enlargement       Past Surgical  History:   Procedure Laterality Date    ADENOIDECTOMY       SECTION  1978    TONSILLECTOMY           MEDICATIONS:  I have reviewed and reconciled the patients medication list in the patients chart at the skilled nursing facility today.      ALLERGIES:    Allergies   Allergen Reactions    Bee Venom Anaphylaxis    Ace Inhibitors Cough    Venlafaxine Other (See Comments)     insomnia         SOCIAL HISTORY:    Social History     Socioeconomic History    Marital status:    Tobacco Use    Smoking status: Never    Smokeless tobacco: Never   Vaping Use    Vaping status: Never Used   Substance and Sexual Activity    Alcohol use: No    Drug use: No    Sexual activity: Not Currently     Partners: Male     Birth control/protection: Post-menopausal       FAMILY HISTORY:    Family History   Problem Relation Age of Onset    Diabetes Mother     Heart disease Mother     Heart attack Mother     COPD Mother     Dementia Father     Diabetes Father     Stroke Father         In his Left eye    Squamous cell carcinoma Brother         oral       REVIEW OF SYSTEMS:    Review of Systems  Appetite: Fair [x]   Good []   Poor []   Weight Loss []  [x]  Weight Stable   Unavoidable Weight Loss []  Tolerating Tube Feeding []    Supplements Provided []   Patient is a poor historian given her advanced dementia, review of systems is obtained on direct consultation with her treating nurse/staff, as well as review of records.    PHYSICAL EXAMINATION:   VITAL SIGNS:   Vitals:    24 0818   BP: 133/66   Pulse: 78   Resp: 18   Temp: 97.2 °F (36.2 °C)   SpO2: 97%         Physical Exam  General Appearance:  [x]  Alert   [x]  Oriented x person  [x]  No acute distress     [x]  Confused  []  Disoriented   []  Comatose   Head:  Atraumatic and normocephalic, without obvious abnormality.   Eyes:         PERRLA, conjunctivae and sclerae normal, no Icterus. No pallor. Extra-occular movements are within normal limits.   Ears:  Ears appear  intact with no abnormalities noted.   Throat: No oral lesions, no thrush, oral mucosa moist.   Neck: Supple, trachea midline, no thyromegaly, no carotid bruit.   Back:   No kyphoscoliosis. No tenderness to palpation.   Lungs:   Chest shape is normal. Breath sounds heard bilaterally equally.  No wheezing.  Audible air exchange noted all lung fields.   Heart:  Normal S1 and S2, no murmur, no gallop, no rub. No JVD.   Abdomen:   Normal bowel sounds, no masses, no organomegaly. Soft, non-tender, non-distended, no guarding    Extremities: Moves all extremities; without edema, cyanosis or clubbing.  Frail build.  Poor core strength/stability.   Pulses: Pulses palpable and equal bilaterally.   Skin: No bleeding or rash.  Generalized dry skin noted.  Age-related atrophy of skin.   Neurologic: [x] Normal speech []  Normal mental status    [x] Cranial nerves II through XII intact   [x]  No anosmia [x]  DTR 2+ [x]  Proprioception intact  [x]  No focal motor/sensory deficits      Psych/Mood:                    [x]  No acute changes []  Depressed      Urinary:      [x]  Continent  [x]  Incontinent, at times []  Retention  []  F/C     []  UTI w/treatment in progress      ASSESSMENT     Diagnoses and all orders for this visit:    1. Age-related physical debility (Primary)    2. Impaired mobility and ADLs    3. Acquired hypothyroidism    4. Severe late onset Alzheimer's dementia with mood disturbance    5. Essential hypertension    6. Type 2 diabetes mellitus without complication, without long-term current use of insulin    7. Mixed hyperlipidemia        PLAN  Planned utilization of skilled nursing facility services to aid in mobilization/transfers, as well as all ADLs of need.  Continue to follow nutritional/hydration status, no deficits reported at this time.    Continue blood glucose monitoring, future changes to treatment regimen based on need for maximizing blood glucose control and minimizing hypoglycemic episodes.    Vital  signs demonstrate hemodynamic stability, blood pressure is at goal.    Continue thyroid supplementation, periodic thyroid function studies with dose adjustment when indicated.    Surveillance labs when needed.      [x]  Discussed Patient in detail with nursing/staff, addressed all needs today.     [x]  Plan of Care Reviewed   [x]  PT/OT Reviewed   []  Order Changes  []  Discharge Plans Reviewed  []  Code Status Change        I spent 50 minutes caring for Cassie on this date of service. This time includes time spent by me in the following activities:preparing for the visit, reviewing tests, obtaining and/or reviewing a separately obtained history, performing a medically appropriate examination and/or evaluation , counseling and educating the patient/family/caregiver, ordering medications, tests, or procedures, documenting information in the medical record, and care coordination    Mariano Gordon   03/06/2024

## 2024-04-05 ENCOUNTER — NURSING HOME (OUTPATIENT)
Dept: FAMILY MEDICINE CLINIC | Facility: CLINIC | Age: 69
End: 2024-04-05
Payer: MEDICARE

## 2024-04-05 VITALS
TEMPERATURE: 97.2 F | WEIGHT: 197.2 LBS | RESPIRATION RATE: 18 BRPM | BODY MASS INDEX: 31.83 KG/M2 | OXYGEN SATURATION: 97 % | HEART RATE: 78 BPM | SYSTOLIC BLOOD PRESSURE: 133 MMHG | DIASTOLIC BLOOD PRESSURE: 66 MMHG

## 2024-04-05 DIAGNOSIS — Z78.9 IMPAIRED MOBILITY AND ADLS: Primary | Chronic | ICD-10-CM

## 2024-04-05 DIAGNOSIS — K58.2 IRRITABLE BOWEL SYNDROME WITH BOTH CONSTIPATION AND DIARRHEA: ICD-10-CM

## 2024-04-05 DIAGNOSIS — E03.9 ACQUIRED HYPOTHYROIDISM: ICD-10-CM

## 2024-04-05 DIAGNOSIS — G30.1 SEVERE LATE ONSET ALZHEIMER'S DEMENTIA WITH MOOD DISTURBANCE: ICD-10-CM

## 2024-04-05 DIAGNOSIS — I10 ESSENTIAL HYPERTENSION: ICD-10-CM

## 2024-04-05 DIAGNOSIS — E11.9 TYPE 2 DIABETES MELLITUS WITHOUT COMPLICATION, WITHOUT LONG-TERM CURRENT USE OF INSULIN: ICD-10-CM

## 2024-04-05 DIAGNOSIS — R54 AGE-RELATED PHYSICAL DEBILITY: ICD-10-CM

## 2024-04-05 DIAGNOSIS — Z74.09 IMPAIRED MOBILITY AND ADLS: Primary | Chronic | ICD-10-CM

## 2024-04-05 DIAGNOSIS — F02.C3 SEVERE LATE ONSET ALZHEIMER'S DEMENTIA WITH MOOD DISTURBANCE: ICD-10-CM

## 2024-04-05 PROCEDURE — 99309 SBSQ NF CARE MODERATE MDM 30: CPT | Performed by: FAMILY MEDICINE

## 2024-04-09 NOTE — PROGRESS NOTES
Nursing Home Progress Note        Mariano Gordon DO [x]  VALENTINA Novak []  [address] [place] [address]  Phone: [phone]  Fax: [phone] Mabel Aiken MD []  Aaron Ferreira DO []  [address] [place] [address]  Phone: [phone]  Fax: [phone]     PATIENT NAME: Cassie Gomez                                                                          YOB: 1955           DATE OF SERVICE: 04/05/2024  FACILITY: []  [place]  [] [place]  [x]  [facility]  [] [facility]  []  Other ______________________________________________________________________     CHIEF COMPLAINT:  Impaired mobility and ADLs/Alzheimer's dementia/hypothyroidism/hypertension/diabetes mellitus/dyslipidemia/age-related physical debility/irritable bowel syndrome      HISTORY OF PRESENT ILLNESS:   [x]  Follow Up visit for coordination of long term care issues and chronic medical management of Diagnoses and all orders for this visit:    1. Impaired mobility and ADLs (Primary)    2. Age-related physical debility    3. Severe late onset Alzheimer's dementia with mood disturbance    4. Irritable bowel syndrome with both constipation and diarrhea    5. Type 2 diabetes mellitus without complication, without long-term current use of insulin    6. Acquired hypothyroidism    7. Essential hypertension    Nursing/staff reports the patient has been receptive to supportive care for mobilization/transfers, as well as ADLs of need.  No reports of any falls or injuries.  No nutritional/hydration deficits reported.    No acute behavioral changes, sleeping well.    Bowel function is regular.    No reports of cyclical/persistent hypoglycemia.    Vital signs have been stable.      PAST MEDICAL & SURGICAL HISTORY:   Past Medical History:   Diagnosis Date    Alzheimer disease     Colon polyp     Depression     Diabetes mellitus 2000    Essential hypertension 05/24/2018    H/O bone density study 2011    normal     Memory loss     Mixed hyperlipidemia 2017    Thyroid disease     Thyroid enlargement       Past Surgical History:   Procedure Laterality Date    ADENOIDECTOMY       SECTION  1978    TONSILLECTOMY           MEDICATIONS:  I have reviewed and reconciled the patients medication list in the patients chart at the skilled nursing facility today.      ALLERGIES:    Allergies   Allergen Reactions    Bee Venom Anaphylaxis    Ace Inhibitors Cough    Venlafaxine Other (See Comments)     insomnia         SOCIAL HISTORY:    Social History     Socioeconomic History    Marital status:    Tobacco Use    Smoking status: Never    Smokeless tobacco: Never   Vaping Use    Vaping status: Never Used   Substance and Sexual Activity    Alcohol use: No    Drug use: No    Sexual activity: Not Currently     Partners: Male     Birth control/protection: Post-menopausal       FAMILY HISTORY:    Family History   Problem Relation Age of Onset    Diabetes Mother     Heart disease Mother     Heart attack Mother     COPD Mother     Dementia Father     Diabetes Father     Stroke Father         In his Left eye    Squamous cell carcinoma Brother         oral       REVIEW OF SYSTEMS:    Review of Systems  Appetite: Fair []   Good [x]   Poor []   Weight Loss []  [x]  Weight Stable   Unavoidable Weight Loss []  Tolerating Tube Feeding []    Supplements Provided []   Patient is a poor historian given her advanced dementia, review of systems is obtained on direct consultation with her treating staff/nurse, as well as review of records.    PHYSICAL EXAMINATION:   VITAL SIGNS:   Vitals:    24 1058   BP: 133/66   Pulse: 78   Resp: 18   Temp: 97.2 °F (36.2 °C)   SpO2: 97%       Physical Exam    General Appearance:  [x]  Alert   [x]  Oriented x person  [x]  No acute distress     [x]  Confused  []  Disoriented   []  Comatose   Head:  Atraumatic and normocephalic, without obvious abnormality.   Eyes:         PERRLA, conjunctivae and  sclerae normal, no Icterus. No pallor. Extra-occular movements are within normal limits.   Ears:  Ears appear intact with no abnormalities noted.   Throat: No oral lesions, no thrush, oral mucosa moist.   Neck: Supple, trachea midline, no thyromegaly, no carotid bruit.   Back:   No kyphoscoliosis. No tenderness to palpation.   Lungs:   Chest shape is normal. Breath sounds heard bilaterally equally.  No wheezing.  Audible air exchange noted all lung fields.   Heart:  Normal S1 and S2, no murmur, no gallop, no rub. No JVD.   Abdomen:   Normal bowel sounds, no masses, no organomegaly. Soft, non-tender, non-distended, no guarding    Extremities: Moves all extremities; without edema, cyanosis or clubbing.  Frail build.  Poor core strength/stability.   Pulses: Pulses palpable and equal bilaterally.   Skin: No bleeding or rash.  Generalized dry skin noted.  Age-related atrophy of skin.   Neurologic: [x] Normal speech []  Normal mental status    [x] Cranial nerves II through XII intact   [x]  No anosmia [x]  DTR 2+ [x]  Proprioception intact  [x]  No focal motor/sensory deficits      Psych/Mood:                    [x]  No acute changes []  Depressed        Urinary:      [x]  Continent  [x]  Incontinent, at times []  Retention  []  F/C      []  UTI w/treatment in progress         ASSESSMENT     Diagnoses and all orders for this visit:    1. Impaired mobility and ADLs (Primary)    2. Age-related physical debility    3. Severe late onset Alzheimer's dementia with mood disturbance    4. Irritable bowel syndrome with both constipation and diarrhea    5. Type 2 diabetes mellitus without complication, without long-term current use of insulin    6. Acquired hypothyroidism    7. Essential hypertension          PLAN  Plan for continuation of current supportive care for mobilization/transfer assistance, fall precautions in place given her impaired mobility.  Continue to follow nutritional/hydration status, no deficits reported at this  time.  Assist ADLs as needed.    Continue to follow bowel function, monitor closely for hydration changes.    Avoid prolonged fasting.  Has been stable.  Continue blood glucose monitoring.    Continue thyroid supplementation, periodic thyroid function studies with dose adjustment when indicated.    Vital signs demonstrate hemodynamic stability, blood pressure is at goal.    Surveillance labs when needed.    [x]  Discussed Patient in detail with nursing/staff, addressed all needs today.     [x]  Plan of Care Reviewed   []  PT/OT Reviewed   []  Order Changes  []  Discharge Plans Reviewed   []  Code Status Changes    I spent 35 minutes caring for Cassie on this date of service. This time includes time spent by me in the following activities:preparing for the visit, performing a medically appropriate examination and/or evaluation , counseling and educating the patient/family/caregiver, ordering medications, tests, or procedures, documenting information in the medical record, and care coordination    I have reviewed and updated all copied forward information, as appropriate.  I attest to the accuracy and relevance of any unchanged information.       Mariano Gordon DO  04/05/2024

## 2024-04-17 ENCOUNTER — NURSING HOME (OUTPATIENT)
Dept: FAMILY MEDICINE CLINIC | Facility: CLINIC | Age: 69
End: 2024-04-17
Payer: MEDICARE

## 2024-04-17 VITALS
SYSTOLIC BLOOD PRESSURE: 130 MMHG | RESPIRATION RATE: 18 BRPM | HEART RATE: 72 BPM | DIASTOLIC BLOOD PRESSURE: 70 MMHG | OXYGEN SATURATION: 96 % | BODY MASS INDEX: 31.83 KG/M2 | TEMPERATURE: 98.1 F | WEIGHT: 197.2 LBS

## 2024-04-17 DIAGNOSIS — Z79.899 MEDICATION MANAGEMENT: ICD-10-CM

## 2024-04-17 DIAGNOSIS — Z74.09 IMPAIRED MOBILITY AND ADLS: Chronic | ICD-10-CM

## 2024-04-17 DIAGNOSIS — F02.C3 SEVERE LATE ONSET ALZHEIMER'S DEMENTIA WITH MOOD DISTURBANCE: ICD-10-CM

## 2024-04-17 DIAGNOSIS — G30.1 SEVERE LATE ONSET ALZHEIMER'S DEMENTIA WITH MOOD DISTURBANCE: ICD-10-CM

## 2024-04-17 DIAGNOSIS — Z78.9 IMPAIRED MOBILITY AND ADLS: Chronic | ICD-10-CM

## 2024-04-18 NOTE — PROGRESS NOTES
Nursing Home Follow Up Note      Mariano Gordon DO []   VALENTINA Novak [x]  [address] [place] [address]  Phone: [phone]  Fax: [phone] Mabel Aiken MD []    Aaron Ferreira DO []   [address] [place] [address]  Phone: [phone]  Fax: [phone]     PATIENT NAME: Cassie Gomez                                                                          YOB: 1955           DATE OF SERVICE: 2024  FACILITY:  [][place]   [] [place]   [x] [facility]   [] [facility]   [] Other ______________________________________________________________________      CHIEF COMPLAINT:    Exelon patch not staying on.       HISTORY OF PRESENT ILLNESS:     Nursing reports that patient is removing her Exelon patches off of her shoulder so she is never getting full dose. She is not having any increased behaviors but concerned she may need medication changed to pill form. Resting in bed today, pleasant. Tangential conversation. No signs and symptoms of any distress.       PAST MEDICAL & SURGICAL HISTORY:   Past Medical History:   Diagnosis Date    Alzheimer disease     Colon polyp     Depression     Diabetes mellitus 2000    Essential hypertension 2018    H/O bone density study     normal    Memory loss     Mixed hyperlipidemia 2017    Thyroid disease     Thyroid enlargement       Past Surgical History:   Procedure Laterality Date    ADENOIDECTOMY       SECTION  1978    TONSILLECTOMY           MEDICATIONS:  I have reviewed and reconciled the patients medication list in the patients chart at the skilled nursing facility today.      ALLERGIES:    Allergies   Allergen Reactions    Bee Venom Anaphylaxis    Ace Inhibitors Cough    Venlafaxine Other (See Comments)     insomnia         SOCIAL HISTORY:    Social History     Socioeconomic History    Marital status:    Tobacco Use    Smoking status: Never    Smokeless tobacco: Never   Vaping Use     Vaping status: Never Used   Substance and Sexual Activity    Alcohol use: No    Drug use: No    Sexual activity: Not Currently     Partners: Male     Birth control/protection: Post-menopausal       FAMILY HISTORY:    Family History   Problem Relation Age of Onset    Diabetes Mother     Heart disease Mother     Heart attack Mother     COPD Mother     Dementia Father     Diabetes Father     Stroke Father         In his Left eye    Squamous cell carcinoma Brother         oral       REVIEW OF SYSTEMS:    Review of Systems   Constitutional:  Negative for activity change, appetite change, chills, diaphoresis, fatigue, fever, unexpected weight gain and unexpected weight loss.   HENT:  Negative for congestion, mouth sores, nosebleeds and trouble swallowing.    Eyes:  Negative for discharge, redness and itching.   Respiratory:  Negative for apnea, cough, choking, chest tightness, shortness of breath and wheezing.    Cardiovascular:  Negative for leg swelling.   Gastrointestinal:  Negative for abdominal distention, blood in stool, constipation, diarrhea and vomiting.   Endocrine: Negative for polydipsia and polyuria.   Genitourinary:  Negative for decreased urine volume, difficulty urinating, dysuria, frequency, hematuria, urgency and urinary incontinence.   Musculoskeletal:  Positive for arthralgias (chronic).   Skin:  Negative for color change, pallor and rash.   Neurological:  Positive for speech difficulty, weakness, memory problem and confusion.   Psychiatric/Behavioral:  Negative for behavioral problems, dysphoric mood, hallucinations, sleep disturbance and depressed mood. The patient is not nervous/anxious.          PHYSICAL EXAMINATION:   VITAL SIGNS:   Vitals:    04/17/24 1401   BP: 130/70   Pulse: 72   Resp: 18   Temp: 98.1 °F (36.7 °C)   SpO2: 96%   Weight: 89.4 kg (197 lb 3.2 oz)        Physical Exam  Vitals and nursing note reviewed.   Constitutional:       Appearance: Normal appearance.   Eyes:       Conjunctiva/sclera: Conjunctivae normal.   Pulmonary:      Effort: Pulmonary effort is normal. No respiratory distress.      Breath sounds: Normal breath sounds.   Abdominal:      General: Abdomen is flat. Bowel sounds are normal. There is no distension.      Palpations: Abdomen is soft.      Tenderness: There is no abdominal tenderness.   Neurological:      Mental Status: She is alert. She is confused.   Psychiatric:         Mood and Affect: Mood normal.         Behavior: Behavior normal.         Cognition and Memory: Cognition is impaired. Memory is impaired.         RECORDS REVIEW:   I have reviewed records [facility]    ASSESSMENT     Diagnoses and all orders for this visit:    1. Medication management    2. Severe late onset Alzheimer's dementia with mood disturbance    3. Impaired mobility and ADLs      PLAN    Dementia/medication management   -Will not change Exelon at this time. She is doing well on this medication and no concerns besides patient removing. Apply Exelon patch to upper back and apply Tegaderm to ensure stability. Will follow up and monitor.     Continue supportive care for all ADLs.     Nursing encouraged to keep me informed of any acute changes, lack of improvement, or any new concerning symptoms.     [x]  Discussed Patient in detail with nursing/staff, addressed all needs today.     [x]  Plan of Care Reviewed   []  PT/OT Reviewed   [x]  Order Changes  []  Discharge Plans Reviewed  [x]  Advance Directive on file with Nursing Home.   [x]  POA on file with Nursing Home.   [x]  Code Status listed: []  Full Code   []  DNR              VALENTINA Bender.

## 2024-05-01 ENCOUNTER — NURSING HOME (OUTPATIENT)
Dept: FAMILY MEDICINE CLINIC | Facility: CLINIC | Age: 69
End: 2024-05-01
Payer: MEDICARE

## 2024-05-01 VITALS
SYSTOLIC BLOOD PRESSURE: 130 MMHG | BODY MASS INDEX: 31.83 KG/M2 | WEIGHT: 197.2 LBS | OXYGEN SATURATION: 91 % | HEART RATE: 72 BPM | RESPIRATION RATE: 18 BRPM | DIASTOLIC BLOOD PRESSURE: 70 MMHG | TEMPERATURE: 98.1 F

## 2024-05-01 DIAGNOSIS — K58.2 IRRITABLE BOWEL SYNDROME WITH BOTH CONSTIPATION AND DIARRHEA: ICD-10-CM

## 2024-05-01 DIAGNOSIS — Z78.9 IMPAIRED MOBILITY AND ADLS: Primary | Chronic | ICD-10-CM

## 2024-05-01 DIAGNOSIS — R54 AGE-RELATED PHYSICAL DEBILITY: ICD-10-CM

## 2024-05-01 DIAGNOSIS — F02.C3 SEVERE LATE ONSET ALZHEIMER'S DEMENTIA WITH MOOD DISTURBANCE: ICD-10-CM

## 2024-05-01 DIAGNOSIS — Z74.09 IMPAIRED MOBILITY AND ADLS: Primary | Chronic | ICD-10-CM

## 2024-05-01 DIAGNOSIS — I10 ESSENTIAL HYPERTENSION: ICD-10-CM

## 2024-05-01 DIAGNOSIS — E11.9 TYPE 2 DIABETES MELLITUS WITHOUT COMPLICATION, WITHOUT LONG-TERM CURRENT USE OF INSULIN: ICD-10-CM

## 2024-05-01 DIAGNOSIS — G30.1 SEVERE LATE ONSET ALZHEIMER'S DEMENTIA WITH MOOD DISTURBANCE: ICD-10-CM

## 2024-05-01 PROCEDURE — 99309 SBSQ NF CARE MODERATE MDM 30: CPT | Performed by: FAMILY MEDICINE

## 2024-05-01 NOTE — PROGRESS NOTES
Nursing Home Progress Note        Mariano Gordon DO [x]  VALENTINA Novak []  [address] [place] [address]  Phone: [phone]  Fax: [phone] Mabel Aiken MD []  Aaron Ferreira DO []  [address] [place] [address]  Phone: [phone]  Fax: [phone]     PATIENT NAME: Cassie Gomez                                                                          YOB: 1955           DATE OF SERVICE: 5/1/2024  FACILITY: []  [place]  [] [place]  [x]  [facility]  [] [facility]  []  Other ______________________________________________________________________     CHIEF COMPLAINT:  Impaired mobility and ADLs/Alzheimer's dementia/hypothyroidism/hypertension/diabetes mellitus/dyslipidemia/age-related physical debility/irritable bowel syndrome      HISTORY OF PRESENT ILLNESS:   [x]  Follow Up visit for coordination of long term care issues and chronic medical management of Diagnoses and all orders for this visit:    1. Impaired mobility and ADLs (Primary)    2. Age-related physical debility    3. Type 2 diabetes mellitus without complication, without long-term current use of insulin    4. Essential hypertension    5. Severe late onset Alzheimer's dementia with mood disturbance    6. Irritable bowel syndrome with both constipation and diarrhea    Patient remains interactive at times.  Nursing/staff reports that she has been receptive to supportive care for mobilization/transfer assistance, no new falls or injuries reported.  No nutritional/hydration deficits, however she typically does not eat breakfast routinely due to sleeping late in the mornings.    No reports of any cyclical/persistent hypoglycemia.    Vital signs have been stable.    No acute behavioral changes, bowel function regular.      PAST MEDICAL & SURGICAL HISTORY:   Past Medical History:   Diagnosis Date    Alzheimer disease     Colon polyp     Depression     Diabetes mellitus 2000    Essential hypertension  2018    H/O bone density study     normal    Memory loss     Mixed hyperlipidemia 2017    Thyroid disease     Thyroid enlargement       Past Surgical History:   Procedure Laterality Date    ADENOIDECTOMY       SECTION  1978    TONSILLECTOMY           MEDICATIONS:  I have reviewed and reconciled the patients medication list in the patients chart at the skilled nursing facility today.      ALLERGIES:    Allergies   Allergen Reactions    Bee Venom Anaphylaxis    Ace Inhibitors Cough    Venlafaxine Other (See Comments)     insomnia         SOCIAL HISTORY:    Social History     Socioeconomic History    Marital status:    Tobacco Use    Smoking status: Never    Smokeless tobacco: Never   Vaping Use    Vaping status: Never Used   Substance and Sexual Activity    Alcohol use: No    Drug use: No    Sexual activity: Not Currently     Partners: Male     Birth control/protection: Post-menopausal       FAMILY HISTORY:    Family History   Problem Relation Age of Onset    Diabetes Mother     Heart disease Mother     Heart attack Mother     COPD Mother     Dementia Father     Diabetes Father     Stroke Father         In his Left eye    Squamous cell carcinoma Brother         oral       REVIEW OF SYSTEMS:    Review of Systems  Appetite: Fair []   Good [x]   Poor []   Weight Loss []  [x]  Weight Stable   Unavoidable Weight Loss []  Tolerating Tube Feeding []    Supplements Provided []   Patient is a poor historian given her advanced dementia, review of systems is obtained on direct consultation with her treating staff/nurse, as well as review of records.    PHYSICAL EXAMINATION:   VITAL SIGNS:   Vitals:    24 0910   BP: 130/70   Pulse: 72   Resp: 18   Temp: 98.1 °F (36.7 °C)   SpO2: 91%       Physical Exam    General Appearance:  [x]  Alert   [x]  Oriented x person  [x]  No acute distress     [x]  Confused  []  Disoriented   []  Comatose   Head:  Atraumatic and normocephalic, without obvious  abnormality.   Eyes:         PERRLA, conjunctivae and sclerae normal, no Icterus. No pallor. Extra-occular movements are within normal limits.   Ears:  Ears appear intact with no abnormalities noted.   Throat: No oral lesions, no thrush, oral mucosa moist.   Neck: Supple, trachea midline, no thyromegaly, no carotid bruit.   Back:   No kyphoscoliosis. No tenderness to palpation.   Lungs:   Chest shape is normal. Breath sounds heard bilaterally equally.  No wheezing.  Audible air exchange noted all lung fields.   Heart:  Normal S1 and S2, no murmur, no gallop, no rub. No JVD.   Abdomen:   Normal bowel sounds, no masses, no organomegaly. Soft, non-tender, non-distended, no guarding    Extremities: Moves all extremities; without edema, cyanosis or clubbing.  Frail build.  Poor core strength/stability.   Pulses: Pulses palpable and equal bilaterally.   Skin: No bleeding or rash.  Generalized dry skin noted.  Age-related atrophy of skin.   Neurologic: [x] Normal speech []  Normal mental status    [x] Cranial nerves II through XII intact   [x]  No anosmia [x]  DTR 2+ [x]  Proprioception intact  [x]  No focal motor/sensory deficits      Psych/Mood:                    [x]  No acute changes []  Depressed        Urinary:      [x]  Continent  [x]  Incontinent, at times []  Retention  []  F/C      []  UTI w/treatment in progress         ASSESSMENT     Diagnoses and all orders for this visit:    1. Impaired mobility and ADLs (Primary)    2. Age-related physical debility    3. Type 2 diabetes mellitus without complication, without long-term current use of insulin    4. Essential hypertension    5. Severe late onset Alzheimer's dementia with mood disturbance    6. Irritable bowel syndrome with both constipation and diarrhea          PLAN  Plan continuation of current supportive care for mobilization/transfers, fall precautions in place given her impaired mobility and advanced dementia.  Continue to follow her nutritional/hydration  status, encourage breakfast intake as best able.    Vital signs demonstrate hemodynamic stability, blood pressure is at goal.    Continue blood glucose monitoring, changes to treatment regimen can be made in an effort to maximize blood glucose control and minimize hypoglycemic episodes when needed.    Surveillance labs otherwise when needed.    [x]  Discussed Patient in detail with nursing/staff, addressed all needs today.     [x]  Plan of Care Reviewed   []  PT/OT Reviewed   []  Order Changes  []  Discharge Plans Reviewed   []  Code Status Changes    I spent 35 minutes caring for Cassie on this date of service. This time includes time spent by me in the following activities:preparing for the visit, performing a medically appropriate examination and/or evaluation , counseling and educating the patient/family/caregiver, ordering medications, tests, or procedures, documenting information in the medical record, and care coordination    I have reviewed and updated all copied forward information, as appropriate.  I attest to the accuracy and relevance of any unchanged information.       Mariano Gordon DO  5/1/2024

## 2024-06-28 ENCOUNTER — NURSING HOME (OUTPATIENT)
Dept: FAMILY MEDICINE CLINIC | Facility: CLINIC | Age: 69
End: 2024-06-28
Payer: MEDICARE

## 2024-06-28 VITALS
WEIGHT: 194.8 LBS | DIASTOLIC BLOOD PRESSURE: 70 MMHG | OXYGEN SATURATION: 98 % | BODY MASS INDEX: 31.44 KG/M2 | TEMPERATURE: 98.2 F | RESPIRATION RATE: 18 BRPM | HEART RATE: 74 BPM | SYSTOLIC BLOOD PRESSURE: 145 MMHG

## 2024-06-28 DIAGNOSIS — G30.1 SEVERE LATE ONSET ALZHEIMER'S DEMENTIA WITH MOOD DISTURBANCE: Primary | ICD-10-CM

## 2024-06-28 DIAGNOSIS — R54 AGE-RELATED PHYSICAL DEBILITY: ICD-10-CM

## 2024-06-28 DIAGNOSIS — F02.C3 SEVERE LATE ONSET ALZHEIMER'S DEMENTIA WITH MOOD DISTURBANCE: Primary | ICD-10-CM

## 2024-06-28 DIAGNOSIS — Z78.9 IMPAIRED MOBILITY AND ADLS: Chronic | ICD-10-CM

## 2024-06-28 DIAGNOSIS — Z74.09 IMPAIRED MOBILITY AND ADLS: Chronic | ICD-10-CM

## 2024-06-28 PROCEDURE — 99309 SBSQ NF CARE MODERATE MDM 30: CPT | Performed by: NURSE PRACTITIONER

## 2024-06-28 NOTE — LETTER
Nursing Home Follow Up Note      Mariano Gordon DO []   VALENTINA Novak [x]  [address] [place] [address]  Phone: [phone]  Fax: [phone] Mabel Aiken MD []    Aaron Ferreira DO []   [address] [place] [address]  Phone: [phone]  Fax: [phone]     PATIENT NAME: Cassie Gomez                                                                          YOB: 1955           DATE OF SERVICE: 2024  FACILITY:  [][facility]   [] [place]   [x] [facility]   [] [facility]   [] Other ______________________________________________________________________      CHIEF COMPLAINT:    Visit to assess ability to manage personal care and financial benefits.       HISTORY OF PRESENT ILLNESS:     69-year-old female who is recently transitioned to facility for age-related physical debility and impairment of mobility and ADLs. She has severe late onset Alzheimer's dementia with mood disturbance, as well as other chronic comorbid conditions including hypothyroidism, hypertension, diabetes mellitus and dyslipidemia. She is mostly non verbal with some tangential conversation at times. She is total care for all needs , therefore is unable to manage her benefits or personal care.       PAST MEDICAL & SURGICAL HISTORY:   Past Medical History:   Diagnosis Date    Alzheimer disease     Colon polyp     Depression     Diabetes mellitus 2000    Essential hypertension 2018    H/O bone density study     normal    Memory loss     Mixed hyperlipidemia 2017    Thyroid disease     Thyroid enlargement       Past Surgical History:   Procedure Laterality Date    ADENOIDECTOMY       SECTION  1978    TONSILLECTOMY           MEDICATIONS:  I have reviewed and reconciled the patients medication list in the patients chart at the skilled nursing facility today.      ALLERGIES:    Allergies   Allergen Reactions    Bee Venom Anaphylaxis    Ace Inhibitors Cough     Venlafaxine Other (See Comments)     insomnia         SOCIAL HISTORY:    Social History     Socioeconomic History    Marital status:    Tobacco Use    Smoking status: Never    Smokeless tobacco: Never   Vaping Use    Vaping status: Never Used   Substance and Sexual Activity    Alcohol use: No    Drug use: No    Sexual activity: Not Currently     Partners: Male     Birth control/protection: Post-menopausal       FAMILY HISTORY:    Family History   Problem Relation Age of Onset    Diabetes Mother     Heart disease Mother     Heart attack Mother     COPD Mother     Dementia Father     Diabetes Father     Stroke Father         In his Left eye    Squamous cell carcinoma Brother         oral       REVIEW OF SYSTEMS:    Review of Systems   Reason unable to perform ROS: per nursing and patient.   Constitutional:  Negative for activity change, appetite change, chills, diaphoresis, fatigue, fever, unexpected weight gain and unexpected weight loss.   HENT:  Negative for congestion, mouth sores, nosebleeds and trouble swallowing.    Eyes:  Negative for discharge, redness and itching.   Respiratory:  Negative for apnea, cough, choking, shortness of breath and wheezing.    Cardiovascular:  Negative for leg swelling.   Gastrointestinal:  Negative for abdominal distention, blood in stool, constipation, diarrhea and vomiting.   Endocrine: Negative for polydipsia and polyuria.   Genitourinary:  Positive for urinary incontinence. Negative for decreased urine volume, difficulty urinating, frequency, hematuria and urgency.   Musculoskeletal:  Positive for arthralgias (chronic).   Skin:  Negative for color change, pallor and rash.   Neurological:  Positive for speech difficulty, weakness, memory problem and confusion.   Psychiatric/Behavioral:  Negative for behavioral problems, dysphoric mood, hallucinations, sleep disturbance and depressed mood. The patient is not nervous/anxious.          PHYSICAL EXAMINATION:   VITAL SIGNS:    Vitals:    06/28/24 1449   BP: 145/70   Pulse: 74   Resp: 18   Temp: 98.2 °F (36.8 °C)   SpO2: 98%   Weight: 88.4 kg (194 lb 12.8 oz)        Physical Exam  Vitals and nursing note reviewed.   Constitutional:       Appearance: Normal appearance.   Eyes:      Conjunctiva/sclera: Conjunctivae normal.   Pulmonary:      Effort: Pulmonary effort is normal. No respiratory distress.      Breath sounds: Normal breath sounds.   Abdominal:      General: Abdomen is flat. Bowel sounds are normal. There is no distension.      Palpations: Abdomen is soft.      Tenderness: There is no abdominal tenderness.   Neurological:      Mental Status: She is alert. She is confused.   Psychiatric:         Mood and Affect: Mood normal. Affect is flat.         Behavior: Behavior is withdrawn.         Cognition and Memory: Cognition is impaired. Memory is impaired.         RECORDS REVIEW:   I have reviewed records in [facility]    ASSESSMENT     Diagnoses and all orders for this visit:    1. Severe late onset Alzheimer's dementia with mood disturbance (Primary)    2. Age-related physical debility    3. Impaired mobility and ADLs        PLAN    Severe Dementia/age related physical debility/impaired mobility   -She has severe late onset Alzheimer's dementia with mood disturbance, as well as other chronic comorbid conditions including hypothyroidism, hypertension, diabetes mellitus and dyslipidemia. She is mostly non verbal with some tangential conversation at times. She is total care for all needs 24/7, therefore is unable to manage her benefits or personal care.      Continue supportive care for all ADLs.     Nursing encouraged to keep me informed of any acute changes, lack of improvement, or any new concerning symptoms.     [x]  Discussed Patient in detail with nursing/staff, addressed all needs today.     [x]  Plan of Care Reviewed   []  PT/OT Reviewed   [x]  Order Changes  []  Discharge Plans Reviewed  [x]  Advance Directive on file with Nursing  Home.   [x]  POA on file with Nursing Home.   [x]  Code Status listed: []  Full Code   []  DNR       “I confirm accuracy of unchanged data/findings which have been carried forward from previous visit, as well as I have updated appropriately those that have changed.”      I spent 30 minutes caring for Cassie on this date of service. This time includes time spent by me in the following activities:preparing for the visit, reviewing tests, obtaining and/or reviewing a separately obtained history, performing a medically appropriate examination and/or evaluation , counseling and educating the patient/family/caregiver, ordering medications, tests, or procedures, referring and communicating with other health care professionals , documenting information in the medical record, independently interpreting results and communicating that information with the patient/family/caregiver, and care coordination       VALENTINA Bender.

## 2024-06-29 NOTE — PROGRESS NOTES
Nursing Home Follow Up Note      Mariano Gordon DO []   VALENTINA Novak [x]  [address] [place] [address]  Phone: [phone]  Fax: [phone] Mabel Aiken MD []    Aaron Ferreira DO []   [address] [place] [address]  Phone: [phone]  Fax: [phone]     PATIENT NAME: Cassie Gomez                                                                          YOB: 1955           DATE OF SERVICE: 2024  FACILITY:  [][facility]   [] [facility]   [x] [facility]   [] [facility]   [] Other ______________________________________________________________________      CHIEF COMPLAINT:    Visit to assess ability to manage personal care and financial benefits.       HISTORY OF PRESENT ILLNESS:     69-year-old female who is recently transitioned to facility for age-related physical debility and impairment of mobility and ADLs. She has severe late onset Alzheimer's dementia with mood disturbance, as well as other chronic comorbid conditions including hypothyroidism, hypertension, diabetes mellitus and dyslipidemia. She is mostly non verbal with some tangential conversation at times. She is total care for all needs , therefore is unable to manage her benefits or personal care.       PAST MEDICAL & SURGICAL HISTORY:   Past Medical History:   Diagnosis Date    Alzheimer disease     Colon polyp     Depression     Diabetes mellitus 2000    Essential hypertension 2018    H/O bone density study     normal    Memory loss     Mixed hyperlipidemia 2017    Thyroid disease     Thyroid enlargement       Past Surgical History:   Procedure Laterality Date    ADENOIDECTOMY       SECTION  1978    TONSILLECTOMY           MEDICATIONS:  I have reviewed and reconciled the patients medication list in the patients chart at the skilled nursing facility today.      ALLERGIES:    Allergies   Allergen Reactions    Bee Venom Anaphylaxis    Ace Inhibitors Cough     Venlafaxine Other (See Comments)     insomnia         SOCIAL HISTORY:    Social History     Socioeconomic History    Marital status:    Tobacco Use    Smoking status: Never    Smokeless tobacco: Never   Vaping Use    Vaping status: Never Used   Substance and Sexual Activity    Alcohol use: No    Drug use: No    Sexual activity: Not Currently     Partners: Male     Birth control/protection: Post-menopausal       FAMILY HISTORY:    Family History   Problem Relation Age of Onset    Diabetes Mother     Heart disease Mother     Heart attack Mother     COPD Mother     Dementia Father     Diabetes Father     Stroke Father         In his Left eye    Squamous cell carcinoma Brother         oral       REVIEW OF SYSTEMS:    Review of Systems   Reason unable to perform ROS: per nursing and patient.   Constitutional:  Negative for activity change, appetite change, chills, diaphoresis, fatigue, fever, unexpected weight gain and unexpected weight loss.   HENT:  Negative for congestion, mouth sores, nosebleeds and trouble swallowing.    Eyes:  Negative for discharge, redness and itching.   Respiratory:  Negative for apnea, cough, choking, shortness of breath and wheezing.    Cardiovascular:  Negative for leg swelling.   Gastrointestinal:  Negative for abdominal distention, blood in stool, constipation, diarrhea and vomiting.   Endocrine: Negative for polydipsia and polyuria.   Genitourinary:  Positive for urinary incontinence. Negative for decreased urine volume, difficulty urinating, frequency, hematuria and urgency.   Musculoskeletal:  Positive for arthralgias (chronic).   Skin:  Negative for color change, pallor and rash.   Neurological:  Positive for speech difficulty, weakness, memory problem and confusion.   Psychiatric/Behavioral:  Negative for behavioral problems, dysphoric mood, hallucinations, sleep disturbance and depressed mood. The patient is not nervous/anxious.          PHYSICAL EXAMINATION:   VITAL SIGNS:    Vitals:    06/28/24 1449   BP: 145/70   Pulse: 74   Resp: 18   Temp: 98.2 °F (36.8 °C)   SpO2: 98%   Weight: 88.4 kg (194 lb 12.8 oz)        Physical Exam  Vitals and nursing note reviewed.   Constitutional:       Appearance: Normal appearance.   Eyes:      Conjunctiva/sclera: Conjunctivae normal.   Pulmonary:      Effort: Pulmonary effort is normal. No respiratory distress.      Breath sounds: Normal breath sounds.   Abdominal:      General: Abdomen is flat. Bowel sounds are normal. There is no distension.      Palpations: Abdomen is soft.      Tenderness: There is no abdominal tenderness.   Neurological:      Mental Status: She is alert. She is confused.   Psychiatric:         Mood and Affect: Mood normal. Affect is flat.         Behavior: Behavior is withdrawn.         Cognition and Memory: Cognition is impaired. Memory is impaired.         RECORDS REVIEW:   I have reviewed records in [facility]    ASSESSMENT     Diagnoses and all orders for this visit:    1. Severe late onset Alzheimer's dementia with mood disturbance (Primary)    2. Age-related physical debility    3. Impaired mobility and ADLs        PLAN    Severe Dementia/age related physical debility/impaired mobility   -She has severe late onset Alzheimer's dementia with mood disturbance, as well as other chronic comorbid conditions including hypothyroidism, hypertension, diabetes mellitus and dyslipidemia. She is mostly non verbal with some tangential conversation at times. She is total care for all needs 24/7, therefore is unable to manage her benefits or personal care.      Continue supportive care for all ADLs.     Nursing encouraged to keep me informed of any acute changes, lack of improvement, or any new concerning symptoms.     [x]  Discussed Patient in detail with nursing/staff, addressed all needs today.     [x]  Plan of Care Reviewed   []  PT/OT Reviewed   [x]  Order Changes  []  Discharge Plans Reviewed  [x]  Advance Directive on file with Nursing  Home.   [x]  POA on file with Nursing Home.   [x]  Code Status listed: []  Full Code   []  DNR       “I confirm accuracy of unchanged data/findings which have been carried forward from previous visit, as well as I have updated appropriately those that have changed.”      I spent 30 minutes caring for Cassie on this date of service. This time includes time spent by me in the following activities:preparing for the visit, reviewing tests, obtaining and/or reviewing a separately obtained history, performing a medically appropriate examination and/or evaluation , counseling and educating the patient/family/caregiver, ordering medications, tests, or procedures, referring and communicating with other health care professionals , documenting information in the medical record, independently interpreting results and communicating that information with the patient/family/caregiver, and care coordination       VALENTINA Bender.

## 2024-07-03 ENCOUNTER — NURSING HOME (OUTPATIENT)
Dept: FAMILY MEDICINE CLINIC | Facility: CLINIC | Age: 69
End: 2024-07-03
Payer: MEDICARE

## 2024-07-03 VITALS
OXYGEN SATURATION: 98 % | SYSTOLIC BLOOD PRESSURE: 145 MMHG | TEMPERATURE: 98.2 F | DIASTOLIC BLOOD PRESSURE: 68 MMHG | BODY MASS INDEX: 30.99 KG/M2 | WEIGHT: 192 LBS | RESPIRATION RATE: 18 BRPM | HEART RATE: 74 BPM

## 2024-07-03 DIAGNOSIS — E03.9 ACQUIRED HYPOTHYROIDISM: ICD-10-CM

## 2024-07-03 DIAGNOSIS — R54 AGE-RELATED PHYSICAL DEBILITY: ICD-10-CM

## 2024-07-03 DIAGNOSIS — E11.9 TYPE 2 DIABETES MELLITUS WITHOUT COMPLICATION, WITHOUT LONG-TERM CURRENT USE OF INSULIN: ICD-10-CM

## 2024-07-03 DIAGNOSIS — Z74.09 IMPAIRED MOBILITY AND ADLS: Primary | Chronic | ICD-10-CM

## 2024-07-03 DIAGNOSIS — G30.1 SEVERE LATE ONSET ALZHEIMER'S DEMENTIA WITH MOOD DISTURBANCE: ICD-10-CM

## 2024-07-03 DIAGNOSIS — I10 ESSENTIAL HYPERTENSION: ICD-10-CM

## 2024-07-03 DIAGNOSIS — K58.2 IRRITABLE BOWEL SYNDROME WITH BOTH CONSTIPATION AND DIARRHEA: ICD-10-CM

## 2024-07-03 DIAGNOSIS — F02.C3 SEVERE LATE ONSET ALZHEIMER'S DEMENTIA WITH MOOD DISTURBANCE: ICD-10-CM

## 2024-07-03 DIAGNOSIS — Z78.9 IMPAIRED MOBILITY AND ADLS: Primary | Chronic | ICD-10-CM

## 2024-07-03 NOTE — PROGRESS NOTES
Nursing Home Progress Note        Mariano Gordon DO [x]  VALENTINA Novak []  [address] [place] [address]  Phone: [phone]  Fax: [phone] Mabel Aiken MD []  Aaron Ferreira DO []  [address] [place] [address]  Phone: [phone]  Fax: [phone]     PATIENT NAME: Cassie Gomez                                                                          YOB: 1955           DATE OF SERVICE: 7/3/2024  FACILITY: []  [place]  [] [place]  [x]  [facility]  [] [facility]  []  Other ______________________________________________________________________     CHIEF COMPLAINT:  Impaired mobility and ADLs/Alzheimer's dementia/hypothyroidism/hypertension/diabetes mellitus/dyslipidemia/age-related physical debility/irritable bowel syndrome      HISTORY OF PRESENT ILLNESS:   [x]  Follow Up visit for coordination of long term care issues and chronic medical management of Diagnoses and all orders for this visit:    1. Impaired mobility and ADLs (Primary)    2. Age-related physical debility    3. Severe late onset Alzheimer's dementia with mood disturbance    4. Irritable bowel syndrome with both constipation and diarrhea    5. Acquired hypothyroidism    6. Type 2 diabetes mellitus without complication, without long-term current use of insulin    7. Essential hypertension    Nursing/staff reports the patient has been receptive to supportive care for mobilization/transfers, no new falls or injuries reported.  No acute behavioral changes.  Sleeping well.    No reports of cyclical/persistent hypoglycemic episodes.    Vital signs have been stable.    No nutritional/hydration deficits reported.      PAST MEDICAL & SURGICAL HISTORY:   Past Medical History:   Diagnosis Date    Alzheimer disease     Colon polyp     Depression     Diabetes mellitus 2000    Essential hypertension 05/24/2018    H/O bone density study 2011    normal    Memory loss     Mixed hyperlipidemia 07/06/2017     Thyroid disease     Thyroid enlargement       Past Surgical History:   Procedure Laterality Date    ADENOIDECTOMY       SECTION  1978    TONSILLECTOMY           MEDICATIONS:  I have reviewed and reconciled the patients medication list in the patients chart at the skilled nursing facility today.      ALLERGIES:    Allergies   Allergen Reactions    Bee Venom Anaphylaxis    Ace Inhibitors Cough    Venlafaxine Other (See Comments)     insomnia         SOCIAL HISTORY:    Social History     Socioeconomic History    Marital status:    Tobacco Use    Smoking status: Never    Smokeless tobacco: Never   Vaping Use    Vaping status: Never Used   Substance and Sexual Activity    Alcohol use: No    Drug use: No    Sexual activity: Not Currently     Partners: Male     Birth control/protection: Post-menopausal       FAMILY HISTORY:    Family History   Problem Relation Age of Onset    Diabetes Mother     Heart disease Mother     Heart attack Mother     COPD Mother     Dementia Father     Diabetes Father     Stroke Father         In his Left eye    Squamous cell carcinoma Brother         oral       REVIEW OF SYSTEMS:    Review of Systems  Appetite: Fair []   Good [x]   Poor []   Weight Loss []  [x]  Weight Stable   Unavoidable Weight Loss []  Tolerating Tube Feeding []    Supplements Provided []   Patient is a poor historian given her advanced dementia, review of systems is obtained on direct consultation with her treating staff/nurse, as well as review of records.    PHYSICAL EXAMINATION:   VITAL SIGNS:   Vitals:    24 0903   BP: 145/68   Pulse: 74   Resp: 18   Temp: 98.2 °F (36.8 °C)   SpO2: 98%       Physical Exam    General Appearance:  [x]  Alert   [x]  Oriented x person  [x]  No acute distress     [x]  Confused  []  Disoriented   []  Comatose   Head:  Atraumatic and normocephalic, without obvious abnormality.   Eyes:         PERRLA, conjunctivae and sclerae normal, no Icterus. No pallor.  Extra-occular movements are within normal limits.   Ears:  Ears appear intact with no abnormalities noted.   Throat: No oral lesions, no thrush, oral mucosa moist.   Neck: Supple, trachea midline, no thyromegaly, no carotid bruit.   Back:   No kyphoscoliosis. No tenderness to palpation.   Lungs:   Chest shape is normal. Breath sounds heard bilaterally equally.  No wheezing.  Audible air exchange noted all lung fields.   Heart:  Normal S1 and S2, no murmur, no gallop, no rub. No JVD.   Abdomen:   Normal bowel sounds, no masses, no organomegaly. Soft, non-tender, non-distended, no guarding    Extremities: Moves all extremities; without edema, cyanosis or clubbing.  Frail build.  Poor core strength/stability.   Pulses: Pulses palpable and equal bilaterally.   Skin: No bleeding or rash.  Generalized dry skin noted.  Age-related atrophy of skin.   Neurologic: [x] Normal speech []  Normal mental status    [x] Cranial nerves II through XII intact   [x]  No anosmia [x]  DTR 2+ [x]  Proprioception intact  [x]  No focal motor/sensory deficits      Psych/Mood:                    [x]  No acute changes []  Depressed        Urinary:      [x]  Continent  [x]  Incontinent, at times []  Retention  []  F/C      []  UTI w/treatment in progress         ASSESSMENT     Diagnoses and all orders for this visit:    1. Impaired mobility and ADLs (Primary)    2. Age-related physical debility    3. Severe late onset Alzheimer's dementia with mood disturbance    4. Irritable bowel syndrome with both constipation and diarrhea    5. Acquired hypothyroidism    6. Type 2 diabetes mellitus without complication, without long-term current use of insulin    7. Essential hypertension          PLAN  Continue supportive care for mobilization/transfer assistance, fall precautions in place given her advanced age and impaired mobility.    No acute behavioral changes, continues to demonstrate advanced changes of Alzheimer's dementia.  Continue to follow  nutritional/hydration status, no deficits reported at this time.    Avoid prolonged fasting periods.  Maintain appropriate hydration status.  Continue blood glucose monitoring.    Vital signs demonstrate hemodynamic stability, blood pressure is at goal.      Periodic thyroid function studies, dose adjustment when indicated.    Surveillance labs otherwise when needed.    [x]  Discussed Patient in detail with nursing/staff, addressed all needs today.     [x]  Plan of Care Reviewed   []  PT/OT Reviewed   []  Order Changes  []  Discharge Plans Reviewed   []  Code Status Changes    I spent 35 minutes caring for Cassie on this date of service. This time includes time spent by me in the following activities:preparing for the visit, performing a medically appropriate examination and/or evaluation , counseling and educating the patient/family/caregiver, ordering medications, tests, or procedures, documenting information in the medical record, and care coordination    I have reviewed and updated all copied forward information, as appropriate.  I attest to the accuracy and relevance of any unchanged information.       Mariano Gordon DO  7/3/2024

## 2024-08-06 ENCOUNTER — NURSING HOME (OUTPATIENT)
Dept: FAMILY MEDICINE CLINIC | Facility: CLINIC | Age: 69
End: 2024-08-06
Payer: MEDICARE

## 2024-08-06 VITALS
TEMPERATURE: 97.3 F | BODY MASS INDEX: 30.99 KG/M2 | SYSTOLIC BLOOD PRESSURE: 110 MMHG | HEART RATE: 65 BPM | DIASTOLIC BLOOD PRESSURE: 70 MMHG | WEIGHT: 192 LBS | RESPIRATION RATE: 18 BRPM | OXYGEN SATURATION: 98 %

## 2024-08-06 DIAGNOSIS — G30.1 SEVERE LATE ONSET ALZHEIMER'S DEMENTIA WITH MOOD DISTURBANCE: ICD-10-CM

## 2024-08-06 DIAGNOSIS — R55 SYNCOPE, UNSPECIFIED SYNCOPE TYPE: Primary | ICD-10-CM

## 2024-08-06 DIAGNOSIS — D72.829 LEUKOCYTOSIS, UNSPECIFIED TYPE: ICD-10-CM

## 2024-08-06 DIAGNOSIS — Z78.9 IMPAIRED MOBILITY AND ADLS: Chronic | ICD-10-CM

## 2024-08-06 DIAGNOSIS — Z74.09 IMPAIRED MOBILITY AND ADLS: Chronic | ICD-10-CM

## 2024-08-06 DIAGNOSIS — F02.C3 SEVERE LATE ONSET ALZHEIMER'S DEMENTIA WITH MOOD DISTURBANCE: ICD-10-CM

## 2024-08-06 PROCEDURE — 99309 SBSQ NF CARE MODERATE MDM 30: CPT | Performed by: NURSE PRACTITIONER

## 2024-08-06 NOTE — LETTER
Nursing Home Follow Up Note      Mariano Gordon DO []   VALENTINA Novak [x]  [address] [place] [address]  Phone: [phone]  Fax: [phone] Mabel Aiken MD []    Aaron Ferreira DO []   [address] [place] [address]  Phone: [phone]  Fax: [phone]     PATIENT NAME: Cassie Gomez                                                                          YOB: 1955           DATE OF SERVICE: 2024  FACILITY:  [][place]   [] [place]   [x] [facility]   [] [facility]   [] Other ______________________________________________________________________      CHIEF COMPLAINT:    Syncopal episode yesterday.       HISTORY OF PRESENT ILLNESS:     Nursing reports that yesterday while in the shower, patient had episode of syncope.  They got her to bed and after a couple minutes she aroused and was  back to normal.  Her heart rate and blood pressure dropped during the episode but then returns to normal. Glucose was 97 per nursing.  She has not had any other episodes.  Has not had episodes in the past that anyone is aware of.  Due to her severe dementia she could develop voice any concerns.  Per nursing she had no signs and symptoms of distress and she does not today.  Resting in bed with no complaints.  Stat labs were obtained and results available today.  Labs report WBC count of 12.3 but other labs insignificant.      PAST MEDICAL & SURGICAL HISTORY:   Past Medical History:   Diagnosis Date    Alzheimer disease     Colon polyp     Depression     Diabetes mellitus 2000    Essential hypertension 2018    H/O bone density study     normal    Memory loss     Mixed hyperlipidemia 2017    Thyroid disease     Thyroid enlargement       Past Surgical History:   Procedure Laterality Date    ADENOIDECTOMY       SECTION  1978    TONSILLECTOMY           MEDICATIONS:  I have reviewed and reconciled the patients medication list in the patients chart at  the skilled nursing facility today.      ALLERGIES:    Allergies   Allergen Reactions    Bee Venom Anaphylaxis    Ace Inhibitors Cough    Venlafaxine Other (See Comments)     insomnia         SOCIAL HISTORY:    Social History     Socioeconomic History    Marital status:    Tobacco Use    Smoking status: Never    Smokeless tobacco: Never   Vaping Use    Vaping status: Never Used   Substance and Sexual Activity    Alcohol use: No    Drug use: No    Sexual activity: Not Currently     Partners: Male     Birth control/protection: Post-menopausal       FAMILY HISTORY:    Family History   Problem Relation Age of Onset    Diabetes Mother     Heart disease Mother     Heart attack Mother     COPD Mother     Dementia Father     Diabetes Father     Stroke Father         In his Left eye    Squamous cell carcinoma Brother         oral       REVIEW OF SYSTEMS:    Review of Systems   Reason unable to perform ROS: per nursing and patient.   Constitutional:  Negative for activity change, appetite change, chills, diaphoresis, fatigue, fever, unexpected weight gain and unexpected weight loss.   HENT:  Negative for congestion, mouth sores, nosebleeds and trouble swallowing.    Eyes:  Negative for discharge and redness.   Respiratory:  Negative for apnea, cough, choking, shortness of breath and wheezing.    Cardiovascular:  Negative for leg swelling.   Gastrointestinal:  Negative for abdominal distention, blood in stool, constipation, diarrhea and vomiting.   Endocrine: Negative for polydipsia and polyuria.   Genitourinary:  Positive for urinary incontinence. Negative for decreased urine volume, difficulty urinating, frequency, hematuria and urgency.   Musculoskeletal:  Positive for arthralgias (chronic).   Skin:  Negative for color change, pallor and rash.   Neurological:  Positive for syncope (episode yesterday), speech difficulty, weakness, memory problem and confusion.   Psychiatric/Behavioral:  Negative for behavioral  problems, dysphoric mood, hallucinations, sleep disturbance and depressed mood. The patient is not nervous/anxious.          PHYSICAL EXAMINATION:   VITAL SIGNS:   Vitals:    08/06/24 1425   BP: 110/70   Pulse: 65   Resp: 18   Temp: 97.3 °F (36.3 °C)   SpO2: 98%   Weight: 87.1 kg (192 lb)        Physical Exam  Vitals and nursing note reviewed.   Constitutional:       Appearance: Normal appearance.   Eyes:      Conjunctiva/sclera: Conjunctivae normal.   Pulmonary:      Effort: Pulmonary effort is normal. No respiratory distress.      Breath sounds: Normal breath sounds.   Abdominal:      General: Abdomen is flat. Bowel sounds are normal. There is no distension.      Palpations: Abdomen is soft.      Tenderness: There is no abdominal tenderness.   Neurological:      Mental Status: She is alert. She is confused.   Psychiatric:         Mood and Affect: Mood normal. Affect is flat.         Behavior: Behavior is withdrawn.         Cognition and Memory: Cognition is impaired. Memory is impaired.         RECORDS REVIEW:   I have reviewed records in [facility]    ASSESSMENT     Diagnoses and all orders for this visit:    1. Syncope, unspecified syncope type (Primary)    2. Leukocytosis, unspecified type    3. Severe late onset Alzheimer's dementia with mood disturbance    4. Impaired mobility and ADLs          PLAN    Syncopal episode/Severe Dementia/leukocytosis  -WBC count 12.3 but other labs insignificant. Will obtain UA C&S. Will follow up with results. Family did come in yesterday and did not wish to have her sent to hospital for any further evaluation.      Continue supportive care for all ADLs.     Nursing encouraged to keep me informed of any acute changes, lack of improvement, or any new concerning symptoms.     [x]  Discussed Patient in detail with nursing/staff, addressed all needs today.     [x]  Plan of Care Reviewed   []  PT/OT Reviewed   [x]  Order Changes  []  Discharge Plans Reviewed  [x]  Advance Directive on file  with Nursing Home.   [x]  POA on file with Nursing Home.   [x]  Code Status listed: []  Full Code   [x]  DNR      I spent 30 minutes caring for Cassie on this date of service. This time includes time spent by me in the following activities:preparing for the visit, reviewing tests, obtaining and/or reviewing a separately obtained history, performing a medically appropriate examination and/or evaluation , counseling and educating the patient/family/caregiver, ordering medications, tests, or procedures, referring and communicating with other health care professionals , documenting information in the medical record, independently interpreting results and communicating that information with the patient/family/caregiver, and care coordination    I confirm accuracy of unchanged data/findings which have been carried forward from previous visit, as well as I have updated appropriately those that have changed.       Rosy Zimmer, APRN.

## 2024-08-07 NOTE — PROGRESS NOTES
Nursing Home Follow Up Note      Mariano Gordon DO []   VALENTINA Novak [x]  [address] [place] [address]  Phone: [phone]  Fax: [phone] Mabel Aiken MD []    Aaron Ferreira DO []   [address] [place] [address]  Phone: [phone]  Fax: [phone]     PATIENT NAME: Cassie Gomez                                                                          YOB: 1955           DATE OF SERVICE: 2024  FACILITY:  [][place]   [] [place]   [x] [facility]   [] [facility]   [] Other ______________________________________________________________________      CHIEF COMPLAINT:    Syncopal episode yesterday.       HISTORY OF PRESENT ILLNESS:     Nursing reports that yesterday while in the shower, patient had episode of syncope.  They got her to bed and after a couple minutes she aroused and was  back to normal.  Her heart rate and blood pressure dropped during the episode but then returns to normal. Glucose was 97 per nursing.  She has not had any other episodes.  Has not had episodes in the past that anyone is aware of.  Due to her severe dementia she could develop voice any concerns.  Per nursing she had no signs and symptoms of distress and she does not today.  Resting in bed with no complaints.  Stat labs were obtained and results available today.  Labs report WBC count of 12.3 but other labs insignificant.      PAST MEDICAL & SURGICAL HISTORY:   Past Medical History:   Diagnosis Date    Alzheimer disease     Colon polyp     Depression     Diabetes mellitus 2000    Essential hypertension 2018    H/O bone density study     normal    Memory loss     Mixed hyperlipidemia 2017    Thyroid disease     Thyroid enlargement       Past Surgical History:   Procedure Laterality Date    ADENOIDECTOMY       SECTION  1978    TONSILLECTOMY           MEDICATIONS:  I have reviewed and reconciled the patients medication list in the patients chart at  the skilled nursing facility today.      ALLERGIES:    Allergies   Allergen Reactions    Bee Venom Anaphylaxis    Ace Inhibitors Cough    Venlafaxine Other (See Comments)     insomnia         SOCIAL HISTORY:    Social History     Socioeconomic History    Marital status:    Tobacco Use    Smoking status: Never    Smokeless tobacco: Never   Vaping Use    Vaping status: Never Used   Substance and Sexual Activity    Alcohol use: No    Drug use: No    Sexual activity: Not Currently     Partners: Male     Birth control/protection: Post-menopausal       FAMILY HISTORY:    Family History   Problem Relation Age of Onset    Diabetes Mother     Heart disease Mother     Heart attack Mother     COPD Mother     Dementia Father     Diabetes Father     Stroke Father         In his Left eye    Squamous cell carcinoma Brother         oral       REVIEW OF SYSTEMS:    Review of Systems   Reason unable to perform ROS: per nursing and patient.   Constitutional:  Negative for activity change, appetite change, chills, diaphoresis, fatigue, fever, unexpected weight gain and unexpected weight loss.   HENT:  Negative for congestion, mouth sores, nosebleeds and trouble swallowing.    Eyes:  Negative for discharge and redness.   Respiratory:  Negative for apnea, cough, choking, shortness of breath and wheezing.    Cardiovascular:  Negative for leg swelling.   Gastrointestinal:  Negative for abdominal distention, blood in stool, constipation, diarrhea and vomiting.   Endocrine: Negative for polydipsia and polyuria.   Genitourinary:  Positive for urinary incontinence. Negative for decreased urine volume, difficulty urinating, frequency, hematuria and urgency.   Musculoskeletal:  Positive for arthralgias (chronic).   Skin:  Negative for color change, pallor and rash.   Neurological:  Positive for syncope (episode yesterday), speech difficulty, weakness, memory problem and confusion.   Psychiatric/Behavioral:  Negative for behavioral  problems, dysphoric mood, hallucinations, sleep disturbance and depressed mood. The patient is not nervous/anxious.          PHYSICAL EXAMINATION:   VITAL SIGNS:   Vitals:    08/06/24 1425   BP: 110/70   Pulse: 65   Resp: 18   Temp: 97.3 °F (36.3 °C)   SpO2: 98%   Weight: 87.1 kg (192 lb)        Physical Exam  Vitals and nursing note reviewed.   Constitutional:       Appearance: Normal appearance.   Eyes:      Conjunctiva/sclera: Conjunctivae normal.   Pulmonary:      Effort: Pulmonary effort is normal. No respiratory distress.      Breath sounds: Normal breath sounds.   Abdominal:      General: Abdomen is flat. Bowel sounds are normal. There is no distension.      Palpations: Abdomen is soft.      Tenderness: There is no abdominal tenderness.   Neurological:      Mental Status: She is alert. She is confused.   Psychiatric:         Mood and Affect: Mood normal. Affect is flat.         Behavior: Behavior is withdrawn.         Cognition and Memory: Cognition is impaired. Memory is impaired.         RECORDS REVIEW:   I have reviewed records in [facility]    ASSESSMENT     Diagnoses and all orders for this visit:    1. Syncope, unspecified syncope type (Primary)    2. Leukocytosis, unspecified type    3. Severe late onset Alzheimer's dementia with mood disturbance    4. Impaired mobility and ADLs          PLAN    Syncopal episode/Severe Dementia/leukocytosis  -WBC count 12.3 but other labs insignificant. Will obtain UA C&S. Will follow up with results. Family did come in yesterday and did not wish to have her sent to hospital for any further evaluation.      Continue supportive care for all ADLs.     Nursing encouraged to keep me informed of any acute changes, lack of improvement, or any new concerning symptoms.     [x]  Discussed Patient in detail with nursing/staff, addressed all needs today.     [x]  Plan of Care Reviewed   []  PT/OT Reviewed   [x]  Order Changes  []  Discharge Plans Reviewed  [x]  Advance Directive on file  with Nursing Home.   [x]  POA on file with Nursing Home.   [x]  Code Status listed: []  Full Code   [x]  DNR      I spent 30 minutes caring for Cassie on this date of service. This time includes time spent by me in the following activities:preparing for the visit, reviewing tests, obtaining and/or reviewing a separately obtained history, performing a medically appropriate examination and/or evaluation , counseling and educating the patient/family/caregiver, ordering medications, tests, or procedures, referring and communicating with other health care professionals , documenting information in the medical record, independently interpreting results and communicating that information with the patient/family/caregiver, and care coordination    I confirm accuracy of unchanged data/findings which have been carried forward from previous visit, as well as I have updated appropriately those that have changed.       Rosy Zimmer, APRN.

## 2024-09-04 ENCOUNTER — NURSING HOME (OUTPATIENT)
Dept: FAMILY MEDICINE CLINIC | Facility: CLINIC | Age: 69
End: 2024-09-04
Payer: MEDICARE

## 2024-09-04 VITALS
DIASTOLIC BLOOD PRESSURE: 72 MMHG | HEART RATE: 68 BPM | OXYGEN SATURATION: 98 % | BODY MASS INDEX: 30.62 KG/M2 | SYSTOLIC BLOOD PRESSURE: 116 MMHG | TEMPERATURE: 98 F | WEIGHT: 189.7 LBS | RESPIRATION RATE: 18 BRPM

## 2024-09-04 DIAGNOSIS — F02.C3 SEVERE LATE ONSET ALZHEIMER'S DEMENTIA WITH MOOD DISTURBANCE: ICD-10-CM

## 2024-09-04 DIAGNOSIS — E78.2 MIXED HYPERLIPIDEMIA: ICD-10-CM

## 2024-09-04 DIAGNOSIS — G30.1 SEVERE LATE ONSET ALZHEIMER'S DEMENTIA WITH MOOD DISTURBANCE: ICD-10-CM

## 2024-09-04 DIAGNOSIS — Z74.09 IMPAIRED MOBILITY AND ADLS: Primary | Chronic | ICD-10-CM

## 2024-09-04 DIAGNOSIS — R54 AGE-RELATED PHYSICAL DEBILITY: ICD-10-CM

## 2024-09-04 DIAGNOSIS — I10 ESSENTIAL HYPERTENSION: ICD-10-CM

## 2024-09-04 DIAGNOSIS — E03.9 ACQUIRED HYPOTHYROIDISM: ICD-10-CM

## 2024-09-04 DIAGNOSIS — Z78.9 IMPAIRED MOBILITY AND ADLS: Primary | Chronic | ICD-10-CM

## 2024-09-04 DIAGNOSIS — E11.9 TYPE 2 DIABETES MELLITUS WITHOUT COMPLICATION, WITHOUT LONG-TERM CURRENT USE OF INSULIN: ICD-10-CM

## 2024-09-04 PROCEDURE — 99309 SBSQ NF CARE MODERATE MDM 30: CPT | Performed by: FAMILY MEDICINE

## 2024-09-04 NOTE — PROGRESS NOTES
Nursing Home Progress Note        Mariano Gordon DO [x]  VALENTINA Novak []  [address] [place] [address]  Phone: [phone]  Fax: [phone] Mabel Aiken MD []  Aaron Ferreira DO []  [address] [place] [address]  Phone: [phone]  Fax: [phone]     PATIENT NAME: Cassie Gomez                                                                          YOB: 1955           DATE OF SERVICE: 9/4/2024  FACILITY: []  [facility]  [] [place]  [x]  [facility]  [] [facility]  []  Other ______________________________________________________________________     CHIEF COMPLAINT:  Impaired mobility and ADLs/Alzheimer's dementia/hypothyroidism/hypertension/diabetes mellitus/dyslipidemia/age-related physical debility/irritable bowel syndrome      HISTORY OF PRESENT ILLNESS:   [x]  Follow Up visit for coordination of long term care issues and chronic medical management of Diagnoses and all orders for this visit:    1. Impaired mobility and ADLs (Primary)    2. Age-related physical debility    3. Type 2 diabetes mellitus without complication, without long-term current use of insulin    4. Essential hypertension    5. Acquired hypothyroidism    6. Mixed hyperlipidemia    7. Severe late onset Alzheimer's dementia with mood disturbance    Nursing/staff reports the patient continues to have expected behaviors consistent with her severe Alzheimer's dementia.  No new falls or injuries reported.    No reports of any cyclical/persistent hypoglycemic episodes.  No nutritional/hydration deficits reported.    Vital signs been stable, no complaints of chest pain.    No reports of any focal aspiration.      PAST MEDICAL & SURGICAL HISTORY:   Past Medical History:   Diagnosis Date    Alzheimer disease     Colon polyp     Depression     Diabetes mellitus 2000    Essential hypertension 05/24/2018    H/O bone density study 2011    normal    Memory loss     Mixed hyperlipidemia 07/06/2017     Thyroid disease     Thyroid enlargement       Past Surgical History:   Procedure Laterality Date    ADENOIDECTOMY       SECTION  1978    TONSILLECTOMY           MEDICATIONS:  I have reviewed and reconciled the patients medication list in the patients chart at the skilled nursing facility today.      ALLERGIES:    Allergies   Allergen Reactions    Bee Venom Anaphylaxis    Ace Inhibitors Cough    Venlafaxine Other (See Comments)     insomnia         SOCIAL HISTORY:    Social History     Socioeconomic History    Marital status:    Tobacco Use    Smoking status: Never    Smokeless tobacco: Never   Vaping Use    Vaping status: Never Used   Substance and Sexual Activity    Alcohol use: No    Drug use: No    Sexual activity: Not Currently     Partners: Male     Birth control/protection: Post-menopausal       FAMILY HISTORY:    Family History   Problem Relation Age of Onset    Diabetes Mother     Heart disease Mother     Heart attack Mother     COPD Mother     Dementia Father     Diabetes Father     Stroke Father         In his Left eye    Squamous cell carcinoma Brother         oral       REVIEW OF SYSTEMS:    Review of Systems  Appetite: Fair [x]   Good []   Poor []   Weight Loss []  [x]  Weight Stable   Unavoidable Weight Loss []  Tolerating Tube Feeding []    Supplements Provided []   Patient is a poor historian given her advanced dementia, review of systems is obtained on direct consultation with her treating staff/nurse, as well as review of records.    PHYSICAL EXAMINATION:   VITAL SIGNS:   There were no vitals filed for this visit.      Physical Exam    General Appearance:  [x]  Alert   [x]  Oriented x person  [x]  No acute distress     [x]  Confused  []  Disoriented   []  Comatose   Head:  Atraumatic and normocephalic, without obvious abnormality.   Eyes:         PERRLA, conjunctivae and sclerae normal, no Icterus. No pallor. Extra-occular movements are within normal limits.   Ears:  Ears  appear intact with no abnormalities noted.   Throat: No oral lesions, no thrush, oral mucosa moist.   Neck: Supple, trachea midline, no thyromegaly, no carotid bruit.   Back:   No kyphoscoliosis. No tenderness to palpation.   Lungs:   Chest shape is normal. Breath sounds heard bilaterally equally.  No wheezing.  Audible air exchange noted all lung fields.   Heart:  Normal S1 and S2, no murmur, no gallop, no rub. No JVD.   Abdomen:   Normal bowel sounds, no masses, no organomegaly. Soft, non-tender, non-distended, no guarding    Extremities: Moves all extremities; without edema, cyanosis or clubbing.  Frail build.  Poor core strength/stability.   Pulses: Pulses palpable and equal bilaterally.   Skin: No bleeding or rash.  Generalized dry skin noted.  Age-related atrophy of skin.   Neurologic: [x] Normal speech []  Normal mental status    [x] Cranial nerves II through XII intact   [x]  No anosmia [x]  DTR 2+ [x]  Proprioception intact  [x]  No focal motor/sensory deficits      Psych/Mood:                    [x]  No acute changes []  Depressed        Urinary:      [x]  Continent  [x]  Incontinent, at times []  Retention  []  F/C      []  UTI w/treatment in progress         ASSESSMENT     Diagnoses and all orders for this visit:    1. Impaired mobility and ADLs (Primary)    2. Age-related physical debility    3. Type 2 diabetes mellitus without complication, without long-term current use of insulin    4. Essential hypertension    5. Acquired hypothyroidism    6. Mixed hyperlipidemia    7. Severe late onset Alzheimer's dementia with mood disturbance          PLAN  Use supportive care for mobilization/transfers, as well as any ADLs of need.  Fall precautions in place given her impaired mobility and advanced dementia.    Continue to follow her nutritional/hydration status, avoid prolonged fasting periods as best able.  Continue to monitor overall hydration status.  Continue blood glucose monitoring, further changes to her  treatment regimen can be made if needed to maximize her blood glucose control, yet minimize any potential hypoglycemic episodes.    Continue thyroid supplementation, periodic thyroid function studies with dose adjustment when appropriate.    Vital signs demonstrate hemodynamic stability, blood pressure is at goal.  Demonstrates no findings of acute or unstable angina.    No acute behavioral changes, although does continue to have findings consistent with her advanced Alzheimer's dementia.    Surveillance labs when needed/per routine.    [x]  Discussed Patient in detail with nursing/staff, addressed all needs today.     [x]  Plan of Care Reviewed   []  PT/OT Reviewed   []  Order Changes  []  Discharge Plans Reviewed   []  Code Status Changes    I spent 35 minutes caring for Cassie on this date of service. This time includes time spent by me in the following activities:preparing for the visit, performing a medically appropriate examination and/or evaluation , counseling and educating the patient/family/caregiver, ordering medications, tests, or procedures, documenting information in the medical record, and care coordination    I confirm accuracy of unchanged data/findings which have been carried forward from previous visit, as well as I have updated appropriately those that have changed.         Mariano Gordon DO  9/4/2024

## 2024-09-04 NOTE — LETTER
Nursing Home Progress Note        Mariano Gordon DO [x]  VALENTINA Novak []  [address] [place] [address]  Phone: [phone]  Fax: [phone] Mabel Aiken MD []  Aaron Ferreira DO []  [address] [place] [address]  Phone: [phone]  Fax: [phone]     PATIENT NAME: Cassie Gomez                                                                          YOB: 1955           DATE OF SERVICE: 9/4/2024  FACILITY: []  [facility]  [] [facility]  [x]  [facility]  [] [facility]  []  Other ______________________________________________________________________     CHIEF COMPLAINT:  Impaired mobility and ADLs/Alzheimer's dementia/hypothyroidism/hypertension/diabetes mellitus/dyslipidemia/age-related physical debility/irritable bowel syndrome      HISTORY OF PRESENT ILLNESS:   [x]  Follow Up visit for coordination of long term care issues and chronic medical management of Diagnoses and all orders for this visit:    1. Impaired mobility and ADLs (Primary)    2. Age-related physical debility    3. Type 2 diabetes mellitus without complication, without long-term current use of insulin    4. Essential hypertension    5. Acquired hypothyroidism    6. Mixed hyperlipidemia    7. Severe late onset Alzheimer's dementia with mood disturbance    Nursing/staff reports the patient continues to have expected behaviors consistent with her severe Alzheimer's dementia.  No new falls or injuries reported.    No reports of any cyclical/persistent hypoglycemic episodes.  No nutritional/hydration deficits reported.    Vital signs been stable, no complaints of chest pain.    No reports of any focal aspiration.      PAST MEDICAL & SURGICAL HISTORY:   Past Medical History:   Diagnosis Date    Alzheimer disease     Colon polyp     Depression     Diabetes mellitus 2000    Essential hypertension 05/24/2018    H/O bone density study 2011    normal    Memory loss     Mixed hyperlipidemia 07/06/2017     Thyroid disease     Thyroid enlargement       Past Surgical History:   Procedure Laterality Date    ADENOIDECTOMY       SECTION  1978    TONSILLECTOMY           MEDICATIONS:  I have reviewed and reconciled the patients medication list in the patients chart at the skilled nursing facility today.      ALLERGIES:    Allergies   Allergen Reactions    Bee Venom Anaphylaxis    Ace Inhibitors Cough    Venlafaxine Other (See Comments)     insomnia         SOCIAL HISTORY:    Social History     Socioeconomic History    Marital status:    Tobacco Use    Smoking status: Never    Smokeless tobacco: Never   Vaping Use    Vaping status: Never Used   Substance and Sexual Activity    Alcohol use: No    Drug use: No    Sexual activity: Not Currently     Partners: Male     Birth control/protection: Post-menopausal       FAMILY HISTORY:    Family History   Problem Relation Age of Onset    Diabetes Mother     Heart disease Mother     Heart attack Mother     COPD Mother     Dementia Father     Diabetes Father     Stroke Father         In his Left eye    Squamous cell carcinoma Brother         oral       REVIEW OF SYSTEMS:    Review of Systems  Appetite: Fair [x]   Good []   Poor []   Weight Loss []  [x]  Weight Stable   Unavoidable Weight Loss []  Tolerating Tube Feeding []    Supplements Provided []   Patient is a poor historian given her advanced dementia, review of systems is obtained on direct consultation with her treating staff/nurse, as well as review of records.    PHYSICAL EXAMINATION:   VITAL SIGNS:   There were no vitals filed for this visit.      Physical Exam    General Appearance:  [x]  Alert   [x]  Oriented x person  [x]  No acute distress     [x]  Confused  []  Disoriented   []  Comatose   Head:  Atraumatic and normocephalic, without obvious abnormality.   Eyes:         PERRLA, conjunctivae and sclerae normal, no Icterus. No pallor. Extra-occular movements are within normal limits.   Ears:  Ears  appear intact with no abnormalities noted.   Throat: No oral lesions, no thrush, oral mucosa moist.   Neck: Supple, trachea midline, no thyromegaly, no carotid bruit.   Back:   No kyphoscoliosis. No tenderness to palpation.   Lungs:   Chest shape is normal. Breath sounds heard bilaterally equally.  No wheezing.  Audible air exchange noted all lung fields.   Heart:  Normal S1 and S2, no murmur, no gallop, no rub. No JVD.   Abdomen:   Normal bowel sounds, no masses, no organomegaly. Soft, non-tender, non-distended, no guarding    Extremities: Moves all extremities; without edema, cyanosis or clubbing.  Frail build.  Poor core strength/stability.   Pulses: Pulses palpable and equal bilaterally.   Skin: No bleeding or rash.  Generalized dry skin noted.  Age-related atrophy of skin.   Neurologic: [x] Normal speech []  Normal mental status    [x] Cranial nerves II through XII intact   [x]  No anosmia [x]  DTR 2+ [x]  Proprioception intact  [x]  No focal motor/sensory deficits      Psych/Mood:                    [x]  No acute changes []  Depressed        Urinary:      [x]  Continent  [x]  Incontinent, at times []  Retention  []  F/C      []  UTI w/treatment in progress         ASSESSMENT     Diagnoses and all orders for this visit:    1. Impaired mobility and ADLs (Primary)    2. Age-related physical debility    3. Type 2 diabetes mellitus without complication, without long-term current use of insulin    4. Essential hypertension    5. Acquired hypothyroidism    6. Mixed hyperlipidemia    7. Severe late onset Alzheimer's dementia with mood disturbance          PLAN  Use supportive care for mobilization/transfers, as well as any ADLs of need.  Fall precautions in place given her impaired mobility and advanced dementia.    Continue to follow her nutritional/hydration status, avoid prolonged fasting periods as best able.  Continue to monitor overall hydration status.  Continue blood glucose monitoring, further changes to her  treatment regimen can be made if needed to maximize her blood glucose control, yet minimize any potential hypoglycemic episodes.    Continue thyroid supplementation, periodic thyroid function studies with dose adjustment when appropriate.    Vital signs demonstrate hemodynamic stability, blood pressure is at goal.  Demonstrates no findings of acute or unstable angina.    No acute behavioral changes, although does continue to have findings consistent with her advanced Alzheimer's dementia.    Surveillance labs when needed/per routine.    [x]  Discussed Patient in detail with nursing/staff, addressed all needs today.     [x]  Plan of Care Reviewed   []  PT/OT Reviewed   []  Order Changes  []  Discharge Plans Reviewed   []  Code Status Changes    I spent 35 minutes caring for Cassie on this date of service. This time includes time spent by me in the following activities:preparing for the visit, performing a medically appropriate examination and/or evaluation , counseling and educating the patient/family/caregiver, ordering medications, tests, or procedures, documenting information in the medical record, and care coordination    I confirm accuracy of unchanged data/findings which have been carried forward from previous visit, as well as I have updated appropriately those that have changed.         Mariano Gordon DO  9/4/2024

## 2024-11-06 ENCOUNTER — NURSING HOME (OUTPATIENT)
Dept: FAMILY MEDICINE CLINIC | Facility: CLINIC | Age: 69
End: 2024-11-06
Payer: MEDICARE

## 2024-11-06 VITALS
TEMPERATURE: 97.9 F | OXYGEN SATURATION: 98 % | DIASTOLIC BLOOD PRESSURE: 70 MMHG | WEIGHT: 182.4 LBS | HEIGHT: 66 IN | BODY MASS INDEX: 29.32 KG/M2 | HEART RATE: 64 BPM | RESPIRATION RATE: 18 BRPM | SYSTOLIC BLOOD PRESSURE: 126 MMHG

## 2024-11-06 DIAGNOSIS — Z74.09 IMPAIRED MOBILITY AND ADLS: Primary | Chronic | ICD-10-CM

## 2024-11-06 DIAGNOSIS — E03.9 ACQUIRED HYPOTHYROIDISM: ICD-10-CM

## 2024-11-06 DIAGNOSIS — I10 ESSENTIAL HYPERTENSION: ICD-10-CM

## 2024-11-06 DIAGNOSIS — F02.C3 SEVERE LATE ONSET ALZHEIMER'S DEMENTIA WITH MOOD DISTURBANCE: ICD-10-CM

## 2024-11-06 DIAGNOSIS — Z78.9 IMPAIRED MOBILITY AND ADLS: Primary | Chronic | ICD-10-CM

## 2024-11-06 DIAGNOSIS — E11.9 TYPE 2 DIABETES MELLITUS WITHOUT COMPLICATION, WITHOUT LONG-TERM CURRENT USE OF INSULIN: ICD-10-CM

## 2024-11-06 DIAGNOSIS — R54 AGE-RELATED PHYSICAL DEBILITY: ICD-10-CM

## 2024-11-06 DIAGNOSIS — G30.1 SEVERE LATE ONSET ALZHEIMER'S DEMENTIA WITH MOOD DISTURBANCE: ICD-10-CM

## 2024-11-06 PROCEDURE — 99309 SBSQ NF CARE MODERATE MDM 30: CPT | Performed by: FAMILY MEDICINE

## 2024-11-06 NOTE — PROGRESS NOTES
Nursing Home Progress Note        Mariano Gordon DO [x]  VALENTINA Novak []  [address] [place] [address]  Phone: [phone]  Fax: [phone] Mabel Aiken MD []  Araon Ferreira DO []  [address] [place] [address]  Phone: [phone]  Fax: [phone]     PATIENT NAME: Cassie Gomez                                                                          YOB: 1955           DATE OF SERVICE: 11/6/2024  FACILITY: []  [facility]  [] [facility]  [x]  [facility]  [] [facility]  []  Other ______________________________________________________________________     CHIEF COMPLAINT:  Impaired mobility and ADLs/Alzheimer's dementia/hypothyroidism/hypertension/diabetes mellitus/dyslipidemia/age-related physical debility/irritable bowel syndrome      HISTORY OF PRESENT ILLNESS:   [x]  Follow Up visit for coordination of long term care issues and chronic medical management of Diagnoses and all orders for this visit:    1. Impaired mobility and ADLs (Primary)    2. Age-related physical debility    3. Severe late onset Alzheimer's dementia with mood disturbance    4. Acquired hypothyroidism    5. Type 2 diabetes mellitus without complication, without long-term current use of insulin    6. Essential hypertension    No reports of any falls or injuries since last visit.  Nursing/staff reports she has been receptive to supportive care.    No acute behavior changes, although does demonstrate findings consistent with her known severe late onset Alzheimer's dementia.    No reports of any cyclical/persistent hypoglycemic episodes.    Vital signs have been stable.      PAST MEDICAL & SURGICAL HISTORY:   Past Medical History:   Diagnosis Date    Alzheimer disease     Colon polyp     Depression     Diabetes mellitus 2000    Essential hypertension 05/24/2018    H/O bone density study 2011    normal    Memory loss     Mixed hyperlipidemia 07/06/2017    Thyroid disease     Thyroid enlargement        Past Surgical History:   Procedure Laterality Date    ADENOIDECTOMY       SECTION  1978    TONSILLECTOMY           MEDICATIONS:  I have reviewed and reconciled the patients medication list in the patients chart at the skilled nursing facility today.      ALLERGIES:    Allergies   Allergen Reactions    Bee Venom Anaphylaxis    Ace Inhibitors Cough    Venlafaxine Other (See Comments)     insomnia         SOCIAL HISTORY:    Social History     Socioeconomic History    Marital status:    Tobacco Use    Smoking status: Never    Smokeless tobacco: Never   Vaping Use    Vaping status: Never Used   Substance and Sexual Activity    Alcohol use: No    Drug use: No    Sexual activity: Not Currently     Partners: Male     Birth control/protection: Post-menopausal       FAMILY HISTORY:    Family History   Problem Relation Age of Onset    Diabetes Mother     Heart disease Mother     Heart attack Mother     COPD Mother     Dementia Father     Diabetes Father     Stroke Father         In his Left eye    Squamous cell carcinoma Brother         oral       REVIEW OF SYSTEMS:    Review of Systems  Appetite: Fair [x]   Good []   Poor []   Weight Loss []  [x]  Weight Stable   Unavoidable Weight Loss []  Tolerating Tube Feeding []    Supplements Provided []   Patient is a poor historian given her advanced dementia, review of systems is obtained on direct consultation with her treating staff/nurse, as well as review of records.    PHYSICAL EXAMINATION:   VITAL SIGNS:   Vitals:    24 0818   BP: 126/70   Pulse: 64   Resp: 18   Temp: 97.9 °F (36.6 °C)   SpO2: 98%     BMI is >= 25 and <30. (Overweight) The following options were offered after discussion;: nutrition counseling/recommendations      Physical Exam    General Appearance:  [x]  Alert   [x]  Oriented x person  [x]  No acute distress     [x]  Confused  []  Disoriented   []  Comatose   Head:  Atraumatic and normocephalic, without obvious abnormality.    Eyes:         PERRLA, conjunctivae and sclerae normal, no Icterus. No pallor. Extra-occular movements are within normal limits.   Ears:  Ears appear intact with no abnormalities noted.   Throat: No oral lesions, no thrush, oral mucosa moist.   Neck: Supple, trachea midline, no thyromegaly, no carotid bruit.   Back:   No kyphoscoliosis. No tenderness to palpation.   Lungs:   Chest shape is normal. Breath sounds heard bilaterally equally.  No wheezing.  Audible air exchange noted all lung fields.   Heart:  Normal S1 and S2, no murmur, no gallop, no rub. No JVD.   Abdomen:   Normal bowel sounds, no masses, no organomegaly. Soft, non-tender, non-distended, no guarding    Extremities: Moves all extremities; without edema, cyanosis or clubbing.  Frail build.  Poor core strength/stability.   Pulses: Pulses palpable and equal bilaterally.   Skin: No bleeding or rash.  Generalized dry skin noted.  Age-related atrophy of skin.   Neurologic: [x] Normal speech []  Normal mental status    [x] Cranial nerves II through XII intact   [x]  No anosmia [x]  DTR 2+ [x]  Proprioception intact  [x]  No focal motor/sensory deficits      Psych/Mood:                    [x]  No acute changes []  Depressed        Urinary:      [x]  Continent  [x]  Incontinent, at times []  Retention  []  F/C      []  UTI w/treatment in progress         ASSESSMENT     Diagnoses and all orders for this visit:    1. Impaired mobility and ADLs (Primary)    2. Age-related physical debility    3. Severe late onset Alzheimer's dementia with mood disturbance    4. Acquired hypothyroidism    5. Type 2 diabetes mellitus without complication, without long-term current use of insulin    6. Essential hypertension          PLAN  Supportive care to be continued with respect to mobilization/transfers, including fall precautions given her advanced age and impairment of mobility.  Chronic behavioral changes as related to her advanced Alzheimer's dementia.    Continue thyroid  supplementation, periodic thyroid function studies with dose adjustment when indicated.    Avoid prolonged fasting periods as best able.  Maintain adequate hydration as best able.  Continue blood glucose monitoring, changes to treatment regimen can be made when needed to maximize blood glucose control and minimize hypoglycemic episodes.    Vital signs demonstrate hemodynamic stability, no findings suggestive of unstable angina.    Surveillance labs as per routine/need.    [x]  Discussed Patient in detail with nursing/staff, addressed all needs today.     [x]  Plan of Care Reviewed   []  PT/OT Reviewed   []  Order Changes  []  Discharge Plans Reviewed   []  Code Status Changes    I spent 35 minutes caring for Cassie on this date of service. This time includes time spent by me in the following activities:preparing for the visit, performing a medically appropriate examination and/or evaluation , counseling and educating the patient/family/caregiver, ordering medications, tests, or procedures, documenting information in the medical record, and care coordination    I confirm accuracy of unchanged data/findings which have been carried forward from previous visit, as well as I have updated appropriately those that have changed.       Mariano Gordon DO  11/6/2024

## 2025-01-08 ENCOUNTER — NURSING HOME (OUTPATIENT)
Age: 70
End: 2025-01-08
Payer: MEDICARE

## 2025-01-08 VITALS
HEIGHT: 66 IN | DIASTOLIC BLOOD PRESSURE: 70 MMHG | TEMPERATURE: 97.8 F | WEIGHT: 179.2 LBS | RESPIRATION RATE: 18 BRPM | HEART RATE: 75 BPM | BODY MASS INDEX: 28.8 KG/M2 | SYSTOLIC BLOOD PRESSURE: 132 MMHG

## 2025-01-08 DIAGNOSIS — G30.1 SEVERE LATE ONSET ALZHEIMER'S DEMENTIA WITH MOOD DISTURBANCE: ICD-10-CM

## 2025-01-08 DIAGNOSIS — Z78.9 IMPAIRED MOBILITY AND ADLS: Primary | Chronic | ICD-10-CM

## 2025-01-08 DIAGNOSIS — E11.9 TYPE 2 DIABETES MELLITUS WITHOUT COMPLICATION, WITHOUT LONG-TERM CURRENT USE OF INSULIN: ICD-10-CM

## 2025-01-08 DIAGNOSIS — Z74.09 IMPAIRED MOBILITY AND ADLS: Primary | Chronic | ICD-10-CM

## 2025-01-08 DIAGNOSIS — I10 ESSENTIAL HYPERTENSION: ICD-10-CM

## 2025-01-08 DIAGNOSIS — E03.9 ACQUIRED HYPOTHYROIDISM: ICD-10-CM

## 2025-01-08 DIAGNOSIS — R54 AGE-RELATED PHYSICAL DEBILITY: ICD-10-CM

## 2025-01-08 DIAGNOSIS — F02.C3 SEVERE LATE ONSET ALZHEIMER'S DEMENTIA WITH MOOD DISTURBANCE: ICD-10-CM

## 2025-01-08 PROCEDURE — 99309 SBSQ NF CARE MODERATE MDM 30: CPT | Performed by: FAMILY MEDICINE

## 2025-01-22 NOTE — PROGRESS NOTES
Nursing Home Progress Note        Mariano Gordon DO [x]  VALENTINA Novak []  [address] [place] [address]  Phone: [phone]  Fax: [phone] Mabel Aiken MD []  Aaron Ferreira DO []  [address] [place] [address]  Phone: [phone]  Fax: [phone]     PATIENT NAME: Cassie Gomez                                                                          YOB: 1955           DATE OF SERVICE: 1/8/2025  FACILITY: []  [facility]  [] [facility]  [x]  [facility]  [] [facility]  []  Other ______________________________________________________________________     CHIEF COMPLAINT:  Impaired mobility and ADLs/Alzheimer's dementia/hypothyroidism/hypertension/diabetes mellitus/dyslipidemia/age-related physical debility/irritable bowel syndrome      HISTORY OF PRESENT ILLNESS:   [x]  Follow Up visit for coordination of long term care issues and chronic medical management of Diagnoses and all orders for this visit:    1. Impaired mobility and ADLs (Primary)    2. Age-related physical debility    3. Severe late onset Alzheimer's dementia with mood disturbance    4. Essential hypertension    5. Type 2 diabetes mellitus without complication, without long-term current use of insulin    6. Acquired hypothyroidism    Nursing/staff reports the patient has been receptive to supportive care, no new falls or injuries reported.  No nutritional/hydration deficits reported.    Vital signs have been stable.  No complaints/signs/symptoms of chest pain.    No reports of any cyclical/persistent hypoglycemia.      PAST MEDICAL & SURGICAL HISTORY:   Past Medical History:   Diagnosis Date    Alzheimer disease     Colon polyp     Depression     Diabetes mellitus 2000    Essential hypertension 05/24/2018    H/O bone density study 2011    normal    Memory loss     Mixed hyperlipidemia 07/06/2017    Thyroid disease     Thyroid enlargement       Past Surgical History:   Procedure Laterality Date     ADENOIDECTOMY       SECTION  1978    TONSILLECTOMY           MEDICATIONS:  I have reviewed and reconciled the patients medication list in the patients chart at the skilled nursing facility today.      ALLERGIES:    Allergies   Allergen Reactions    Bee Venom Anaphylaxis    Ace Inhibitors Cough    Venlafaxine Other (See Comments)     insomnia         SOCIAL HISTORY:    Social History     Socioeconomic History    Marital status:    Tobacco Use    Smoking status: Never    Smokeless tobacco: Never   Vaping Use    Vaping status: Never Used   Substance and Sexual Activity    Alcohol use: No    Drug use: No    Sexual activity: Not Currently     Partners: Male     Birth control/protection: Post-menopausal       FAMILY HISTORY:    Family History   Problem Relation Age of Onset    Diabetes Mother     Heart disease Mother     Heart attack Mother     COPD Mother     Dementia Father     Diabetes Father     Stroke Father         In his Left eye    Squamous cell carcinoma Brother         oral       REVIEW OF SYSTEMS:    Review of Systems  Appetite: Fair [x]   Good []   Poor []   Weight Loss []  [x]  Weight Stable   Unavoidable Weight Loss []  Tolerating Tube Feeding []    Supplements Provided []   Patient is a poor historian given her advanced dementia, review of systems is obtained on direct consultation with her treating staff/nurse, as well as review of records.    PHYSICAL EXAMINATION:   VITAL SIGNS:   Vitals:    25 0833   BP: 132/70   Pulse: 75   Resp: 18   Temp: 97.8 °F (36.6 °C)     BMI is >= 25 and <30. (Overweight) The following options were offered after discussion;: nutrition counseling/recommendations      Physical Exam    General Appearance:  [x]  Alert   [x]  Oriented x person  [x]  No acute distress     [x]  Confused  []  Disoriented   []  Comatose   Head:  Atraumatic and normocephalic, without obvious abnormality.   Eyes:         PERRLA, conjunctivae and sclerae normal, no Icterus. No  pallor. Extra-occular movements are within normal limits.   Ears:  Ears appear intact with no abnormalities noted.   Throat: No oral lesions, no thrush, oral mucosa moist.   Neck: Supple, trachea midline, no thyromegaly, no carotid bruit.   Back:   No kyphoscoliosis. No tenderness to palpation.   Lungs:   Chest shape is normal. Breath sounds heard bilaterally equally.  No wheezing.  Audible air exchange noted all lung fields.   Heart:  Normal S1 and S2, no murmur, no gallop, no rub. No JVD.   Abdomen:   Normal bowel sounds, no masses, no organomegaly. Soft, non-tender, non-distended, no guarding    Extremities: Moves all extremities; without edema, cyanosis or clubbing.  Frail build.  Poor core strength/stability.   Pulses: Pulses palpable and equal bilaterally.   Skin: No bleeding or rash.  Generalized dry skin noted.  Age-related atrophy of skin.   Neurologic: [x] Normal speech []  Normal mental status    [x] Cranial nerves II through XII intact   [x]  No anosmia [x]  DTR 2+ [x]  Proprioception intact  [x]  No focal motor/sensory deficits      Psych/Mood:                    [x]  No acute changes []  Depressed        Urinary:      [x]  Continent  [x]  Incontinent, at times []  Retention  []  F/C      []  UTI w/treatment in progress         ASSESSMENT     Diagnoses and all orders for this visit:    1. Impaired mobility and ADLs (Primary)    2. Age-related physical debility    3. Severe late onset Alzheimer's dementia with mood disturbance    4. Essential hypertension    5. Type 2 diabetes mellitus without complication, without long-term current use of insulin    6. Acquired hypothyroidism          PLAN  Continuation of supportive care, for precautions in place given her advanced age, impaired mobility and severe dementia.  Continue to follow nutritional/hydration status, aspiration precautions in place.    Avoid prolonged fasting episodes as best able, maintain appropriate hydration status.    Continue blood glucose  monitoring, adjustments to her treatment regimen can be made when needed to maximize blood glucose control and minimize hypoglycemic episodes.    Vital signs demonstrate hemodynamic stability, no obvious findings/symptoms of unstable angina.    Continue thyroid supplementation, periodic thyroid function studies with dose adjustment when indicated.    Surveillance labs when needed.    [x]  Discussed Patient in detail with nursing/staff, addressed all needs today.     [x]  Plan of Care Reviewed   []  PT/OT Reviewed   []  Order Changes  []  Discharge Plans Reviewed   []  Code Status Changes    I spent 35 minutes caring for Cassie on this date of service. This time includes time spent by me in the following activities:preparing for the visit, performing a medically appropriate examination and/or evaluation , counseling and educating the patient/family/caregiver, ordering medications, tests, or procedures, documenting information in the medical record, and care coordination    I confirm accuracy of unchanged data/findings which have been carried forward from previous visit, as well as I have updated appropriately those that have changed.         Mariano Gordon DO  1/8/2025

## 2025-03-05 ENCOUNTER — NURSING HOME (OUTPATIENT)
Age: 70
End: 2025-03-05
Payer: MEDICARE

## 2025-03-05 VITALS
RESPIRATION RATE: 18 BRPM | HEIGHT: 66 IN | HEART RATE: 68 BPM | OXYGEN SATURATION: 97 % | SYSTOLIC BLOOD PRESSURE: 118 MMHG | BODY MASS INDEX: 29.09 KG/M2 | DIASTOLIC BLOOD PRESSURE: 72 MMHG | TEMPERATURE: 98 F | WEIGHT: 181 LBS

## 2025-03-05 DIAGNOSIS — E03.9 ACQUIRED HYPOTHYROIDISM: ICD-10-CM

## 2025-03-05 DIAGNOSIS — Z78.9 IMPAIRED MOBILITY AND ADLS: Primary | Chronic | ICD-10-CM

## 2025-03-05 DIAGNOSIS — I10 ESSENTIAL HYPERTENSION: ICD-10-CM

## 2025-03-05 DIAGNOSIS — Z74.09 IMPAIRED MOBILITY AND ADLS: Primary | Chronic | ICD-10-CM

## 2025-03-05 DIAGNOSIS — G30.1 SEVERE LATE ONSET ALZHEIMER'S DEMENTIA WITH MOOD DISTURBANCE: ICD-10-CM

## 2025-03-05 DIAGNOSIS — E11.9 TYPE 2 DIABETES MELLITUS WITHOUT COMPLICATION, WITHOUT LONG-TERM CURRENT USE OF INSULIN: ICD-10-CM

## 2025-03-05 DIAGNOSIS — F02.C3 SEVERE LATE ONSET ALZHEIMER'S DEMENTIA WITH MOOD DISTURBANCE: ICD-10-CM

## 2025-03-05 DIAGNOSIS — R54 AGE-RELATED PHYSICAL DEBILITY: ICD-10-CM

## 2025-03-05 NOTE — PROGRESS NOTES
Nursing Home Progress Note        Mariano Gordon DO [x]  VALENTINA Novak []  [address] [place] [address]  Phone: [phone]  Fax: [phone] Mabel Aiken MD []  Aaron Ferreira DO []  [address] [place] [address]  Phone: [phone]  Fax: [phone]     PATIENT NAME: Cassie Gomez                                                                          YOB: 1955           DATE OF SERVICE: 3/5/2025  FACILITY: []  [place]  [] [facility]  [x]  [facility]  [] [facility]  []  Other ______________________________________________________________________     CHIEF COMPLAINT:  Impaired mobility and ADLs/Alzheimer's dementia/hypothyroidism/hypertension/diabetes mellitus/dyslipidemia/age-related physical debility/irritable bowel syndrome      HISTORY OF PRESENT ILLNESS:   [x]  Follow Up visit for coordination of long term care issues and chronic medical management of Diagnoses and all orders for this visit:    1. Impaired mobility and ADLs (Primary)    2. Age-related physical debility    3. Severe late onset Alzheimer's dementia with mood disturbance    4. Acquired hypothyroidism    5. Type 2 diabetes mellitus without complication, without long-term current use of insulin    6. Essential hypertension    Nursing/staff reports that patient has been receptive to supportive care.  No new falls or injuries reported.  No nutritional/hydration deficits.    No acute behavioral changes, although does demonstrate findings consistent with significantly advanced Alzheimer's dementia.  Sleeps well.    No reports of any cyclical/persistent hypoglycemic episodes.    Vital signs have been stable.      PAST MEDICAL & SURGICAL HISTORY:   Past Medical History:   Diagnosis Date    Alzheimer disease     Colon polyp     Depression     Diabetes mellitus 2000    Essential hypertension 05/24/2018    H/O bone density study 2011    normal    Memory loss     Mixed hyperlipidemia 07/06/2017     Thyroid disease     Thyroid enlargement       Past Surgical History:   Procedure Laterality Date    ADENOIDECTOMY       SECTION  1978    TONSILLECTOMY           MEDICATIONS:  I have reviewed and reconciled the patients medication list in the patients chart at the skilled nursing facility today.      ALLERGIES:    Allergies   Allergen Reactions    Bee Venom Anaphylaxis    Ace Inhibitors Cough    Venlafaxine Other (See Comments)     insomnia         SOCIAL HISTORY:    Social History     Socioeconomic History    Marital status:    Tobacco Use    Smoking status: Never    Smokeless tobacco: Never   Vaping Use    Vaping status: Never Used   Substance and Sexual Activity    Alcohol use: No    Drug use: No    Sexual activity: Not Currently     Partners: Male     Birth control/protection: Post-menopausal       FAMILY HISTORY:    Family History   Problem Relation Age of Onset    Diabetes Mother     Heart disease Mother     Heart attack Mother     COPD Mother     Dementia Father     Diabetes Father     Stroke Father         In his Left eye    Squamous cell carcinoma Brother         oral       REVIEW OF SYSTEMS:    Review of Systems  Appetite: Fair [x]   Good []   Poor []   Weight Loss []  [x]  Weight Stable   Unavoidable Weight Loss []  Tolerating Tube Feeding []    Supplements Provided []   Patient is a poor historian given her advanced dementia, review of systems is obtained on direct consultation with her treating staff/nurse, as well as review of records.    PHYSICAL EXAMINATION:   VITAL SIGNS:   Vitals:    25 1112   BP: 118/72   Pulse: 68   Resp: 18   Temp: 98 °F (36.7 °C)   SpO2: 97%     BMI is >= 25 and <30. (Overweight) The following options were offered after discussion;: nutrition counseling/recommendations      Physical Exam    General Appearance:  [x]  Alert   [x]  Oriented x person  [x]  No acute distress     [x]  Confused  []  Disoriented   []  Comatose   Head:  Atraumatic and  normocephalic, without obvious abnormality.   Eyes:         PERRLA, conjunctivae and sclerae normal, no Icterus. No pallor. Extra-occular movements are within normal limits.   Ears:  Ears appear intact with no abnormalities noted.   Throat: No oral lesions, no thrush, oral mucosa moist.   Neck: Supple, trachea midline, no thyromegaly, no carotid bruit.   Back:   No kyphoscoliosis. No tenderness to palpation.   Lungs:   Chest shape is normal. Breath sounds heard bilaterally equally.  No wheezing.  Audible air exchange noted all lung fields.   Heart:  Normal S1 and S2, no murmur, no gallop, no rub. No JVD.   Abdomen:   Normal bowel sounds, no masses, no organomegaly. Soft, non-tender, non-distended, no guarding    Extremities: Moves all extremities; without edema, cyanosis or clubbing.  Frail build.  Poor core strength/stability.   Pulses: Pulses palpable and equal bilaterally.   Skin: No bleeding or rash.  Generalized dry skin noted.  Age-related atrophy of skin.   Neurologic: [x] Normal speech []  Normal mental status    [x] Cranial nerves II through XII intact   [x]  No anosmia [x]  DTR 2+ [x]  Proprioception intact  [x]  No focal motor/sensory deficits      Psych/Mood:                    [x]  No acute changes []  Depressed        Urinary:      [x]  Continent  [x]  Incontinent, at times []  Retention  []  F/C      []  UTI w/treatment in progress         ASSESSMENT     Diagnoses and all orders for this visit:    1. Impaired mobility and ADLs (Primary)    2. Age-related physical debility    3. Severe late onset Alzheimer's dementia with mood disturbance    4. Acquired hypothyroidism    5. Type 2 diabetes mellitus without complication, without long-term current use of insulin    6. Essential hypertension          PLAN  Continuation of supportive care for mobilization/transfers, fall precautions in place given her impaired mobility and advanced dementia.    No acute behavioral changes, sleeping well.  Does demonstrate  findings consistent with advanced dementia.  Plan to follow clinically.    Continue thyroid supplementation, periodic thyroid function studies with dose adjustment when indicated.    Continue blood glucose monitoring.  Monitor closely for signs of hypoglycemia.  Adjust treatment regimen as needed to maximize blood glucose control.    Vital signs demonstrate hemodynamic stability, blood pressure and heart rate are at goal.  Demonstrates no findings of unstable angina.    Surveillance labs as per routine/need.    [x]  Discussed Patient in detail with nursing/staff, addressed all needs today.     [x]  Plan of Care Reviewed   []  PT/OT Reviewed   []  Order Changes  []  Discharge Plans Reviewed   []  Code Status Changes    I spent 35 minutes caring for Cassie on this date of service. This time includes time spent by me in the following activities:preparing for the visit, performing a medically appropriate examination and/or evaluation , counseling and educating the patient/family/caregiver, ordering medications, tests, or procedures, documenting information in the medical record, and care coordination    I confirm accuracy of unchanged data/findings which have been carried forward from previous visit, as well as I have updated appropriately those that have changed.       Mariano Gordon DO  3/5/2025

## 2025-05-07 ENCOUNTER — NURSING HOME (OUTPATIENT)
Age: 70
End: 2025-05-07
Payer: MEDICARE

## 2025-05-07 VITALS
WEIGHT: 173.6 LBS | BODY MASS INDEX: 27.9 KG/M2 | DIASTOLIC BLOOD PRESSURE: 68 MMHG | TEMPERATURE: 97.8 F | OXYGEN SATURATION: 96 % | HEART RATE: 68 BPM | HEIGHT: 66 IN | RESPIRATION RATE: 16 BRPM | SYSTOLIC BLOOD PRESSURE: 118 MMHG

## 2025-05-07 DIAGNOSIS — Z78.9 IMPAIRED MOBILITY AND ADLS: Primary | Chronic | ICD-10-CM

## 2025-05-07 DIAGNOSIS — Z74.09 IMPAIRED MOBILITY AND ADLS: Primary | Chronic | ICD-10-CM

## 2025-05-07 DIAGNOSIS — R54 AGE-RELATED PHYSICAL DEBILITY: ICD-10-CM

## 2025-05-07 DIAGNOSIS — F02.C3 SEVERE LATE ONSET ALZHEIMER'S DEMENTIA WITH MOOD DISTURBANCE: ICD-10-CM

## 2025-05-07 DIAGNOSIS — E11.9 TYPE 2 DIABETES MELLITUS WITHOUT COMPLICATION, WITHOUT LONG-TERM CURRENT USE OF INSULIN: ICD-10-CM

## 2025-05-07 DIAGNOSIS — G40.909 SEIZURE DISORDER: Chronic | ICD-10-CM

## 2025-05-07 DIAGNOSIS — E03.9 ACQUIRED HYPOTHYROIDISM: ICD-10-CM

## 2025-05-07 DIAGNOSIS — I10 ESSENTIAL HYPERTENSION: ICD-10-CM

## 2025-05-07 DIAGNOSIS — G30.1 SEVERE LATE ONSET ALZHEIMER'S DEMENTIA WITH MOOD DISTURBANCE: ICD-10-CM

## 2025-05-29 ENCOUNTER — APPOINTMENT (OUTPATIENT)
Dept: CT IMAGING | Facility: HOSPITAL | Age: 70
DRG: 690 | End: 2025-05-29
Payer: MEDICARE

## 2025-05-29 ENCOUNTER — APPOINTMENT (OUTPATIENT)
Dept: GENERAL RADIOLOGY | Facility: HOSPITAL | Age: 70
DRG: 690 | End: 2025-05-29
Payer: MEDICARE

## 2025-05-29 ENCOUNTER — APPOINTMENT (OUTPATIENT)
Dept: MRI IMAGING | Facility: HOSPITAL | Age: 70
DRG: 690 | End: 2025-05-29
Payer: MEDICARE

## 2025-05-29 ENCOUNTER — HOSPITAL ENCOUNTER (INPATIENT)
Facility: HOSPITAL | Age: 70
LOS: 4 days | Discharge: NURSING, REHAB OR LONG-TERM CARE | DRG: 690 | End: 2025-06-02
Attending: STUDENT IN AN ORGANIZED HEALTH CARE EDUCATION/TRAINING PROGRAM | Admitting: FAMILY MEDICINE
Payer: MEDICARE

## 2025-05-29 ENCOUNTER — APPOINTMENT (OUTPATIENT)
Dept: SLEEP MEDICINE | Facility: HOSPITAL | Age: 70
DRG: 690 | End: 2025-05-29
Payer: MEDICARE

## 2025-05-29 DIAGNOSIS — R56.9 SEIZURE-LIKE ACTIVITY: ICD-10-CM

## 2025-05-29 DIAGNOSIS — D72.829 LEUKOCYTOSIS, UNSPECIFIED TYPE: ICD-10-CM

## 2025-05-29 DIAGNOSIS — G40.909 SEIZURE DISORDER: Chronic | ICD-10-CM

## 2025-05-29 DIAGNOSIS — N39.0 UTI (URINARY TRACT INFECTION) WITH PYURIA: ICD-10-CM

## 2025-05-29 DIAGNOSIS — R79.89 ELEVATED LACTIC ACID LEVEL: ICD-10-CM

## 2025-05-29 DIAGNOSIS — R41.82 ALTERED MENTAL STATUS, UNSPECIFIED ALTERED MENTAL STATUS TYPE: Primary | ICD-10-CM

## 2025-05-29 DIAGNOSIS — R79.89 ELEVATED TROPONIN: ICD-10-CM

## 2025-05-29 LAB
ALBUMIN SERPL-MCNC: 4 G/DL (ref 3.5–5.2)
ALBUMIN/GLOB SERPL: 1.1 G/DL
ALP SERPL-CCNC: 107 U/L (ref 39–117)
ALT SERPL W P-5'-P-CCNC: 7 U/L (ref 1–33)
ANION GAP SERPL CALCULATED.3IONS-SCNC: 13.9 MMOL/L (ref 5–15)
AST SERPL-CCNC: 18 U/L (ref 1–32)
B PARAPERT DNA SPEC QL NAA+PROBE: NOT DETECTED
B PERT DNA SPEC QL NAA+PROBE: NOT DETECTED
BACTERIA UR QL AUTO: ABNORMAL /HPF
BASOPHILS # BLD AUTO: 0.13 10*3/MM3 (ref 0–0.2)
BASOPHILS NFR BLD AUTO: 0.7 % (ref 0–1.5)
BILIRUB SERPL-MCNC: 0.4 MG/DL (ref 0–1.2)
BUN SERPL-MCNC: 17 MG/DL (ref 8–23)
BUN/CREAT SERPL: 12.8 (ref 7–25)
C PNEUM DNA NPH QL NAA+NON-PROBE: NOT DETECTED
CA-I SERPL ISE-MCNC: 1.24 MMOL/L (ref 1.15–1.35)
CALCIUM SPEC-SCNC: 10 MG/DL (ref 8.6–10.5)
CHLORIDE SERPL-SCNC: 101 MMOL/L (ref 98–107)
CO2 SERPL-SCNC: 23.1 MMOL/L (ref 22–29)
CREAT SERPL-MCNC: 1.33 MG/DL (ref 0.57–1)
D-LACTATE SERPL-SCNC: 1.3 MMOL/L (ref 0.5–2)
D-LACTATE SERPL-SCNC: 2.2 MMOL/L (ref 0.5–2)
DEPRECATED RDW RBC AUTO: 46.5 FL (ref 37–54)
EGFRCR SERPLBLD CKD-EPI 2021: 42.9 ML/MIN/1.73
EOSINOPHIL # BLD AUTO: 0.19 10*3/MM3 (ref 0–0.4)
EOSINOPHIL NFR BLD AUTO: 1 % (ref 0.3–6.2)
ERYTHROCYTE [DISTWIDTH] IN BLOOD BY AUTOMATED COUNT: 14.1 % (ref 12.3–15.4)
FLUAV SUBTYP SPEC NAA+PROBE: NOT DETECTED
FLUBV RNA ISLT QL NAA+PROBE: NOT DETECTED
GEN 5 1HR TROPONIN T REFLEX: 36 NG/L
GLOBULIN UR ELPH-MCNC: 3.6 GM/DL
GLUCOSE SERPL-MCNC: 174 MG/DL (ref 65–99)
HADV DNA SPEC NAA+PROBE: NOT DETECTED
HCOV 229E RNA SPEC QL NAA+PROBE: NOT DETECTED
HCOV HKU1 RNA SPEC QL NAA+PROBE: NOT DETECTED
HCOV NL63 RNA SPEC QL NAA+PROBE: NOT DETECTED
HCOV OC43 RNA SPEC QL NAA+PROBE: NOT DETECTED
HCT VFR BLD AUTO: 47.1 % (ref 34–46.6)
HGB BLD-MCNC: 14.7 G/DL (ref 12–15.9)
HMPV RNA NPH QL NAA+NON-PROBE: NOT DETECTED
HOLD SPECIMEN: NORMAL
HOLD SPECIMEN: NORMAL
HPIV1 RNA ISLT QL NAA+PROBE: NOT DETECTED
HPIV2 RNA SPEC QL NAA+PROBE: NOT DETECTED
HPIV3 RNA NPH QL NAA+PROBE: NOT DETECTED
HPIV4 P GENE NPH QL NAA+PROBE: NOT DETECTED
HYALINE CASTS UR QL AUTO: ABNORMAL /LPF
IMM GRANULOCYTES # BLD AUTO: 0.23 10*3/MM3 (ref 0–0.05)
IMM GRANULOCYTES NFR BLD AUTO: 1.2 % (ref 0–0.5)
LYMPHOCYTES # BLD AUTO: 5.39 10*3/MM3 (ref 0.7–3.1)
LYMPHOCYTES NFR BLD AUTO: 28 % (ref 19.6–45.3)
M PNEUMO IGG SER IA-ACNC: NOT DETECTED
MAGNESIUM SERPL-MCNC: 2.2 MG/DL (ref 1.6–2.4)
MCH RBC QN AUTO: 28.1 PG (ref 26.6–33)
MCHC RBC AUTO-ENTMCNC: 31.2 G/DL (ref 31.5–35.7)
MCV RBC AUTO: 90.1 FL (ref 79–97)
MONOCYTES # BLD AUTO: 1 10*3/MM3 (ref 0.1–0.9)
MONOCYTES NFR BLD AUTO: 5.2 % (ref 5–12)
NEUTROPHILS NFR BLD AUTO: 12.28 10*3/MM3 (ref 1.7–7)
NEUTROPHILS NFR BLD AUTO: 63.9 % (ref 42.7–76)
NRBC BLD AUTO-RTO: 0 /100 WBC (ref 0–0.2)
NT-PROBNP SERPL-MCNC: 66.5 PG/ML (ref 0–900)
PHOSPHATE SERPL-MCNC: 2.3 MG/DL (ref 2.5–4.5)
PLATELET # BLD AUTO: 340 10*3/MM3 (ref 140–450)
PMV BLD AUTO: 10.4 FL (ref 6–12)
POTASSIUM SERPL-SCNC: 4.6 MMOL/L (ref 3.5–5.2)
PROCALCITONIN SERPL-MCNC: 0.03 NG/ML (ref 0–0.25)
PROT SERPL-MCNC: 7.6 G/DL (ref 6–8.5)
RBC # BLD AUTO: 5.23 10*6/MM3 (ref 3.77–5.28)
RBC # UR STRIP: ABNORMAL /HPF
REF LAB TEST METHOD: ABNORMAL
RHINOVIRUS RNA SPEC NAA+PROBE: NOT DETECTED
RSV RNA NPH QL NAA+NON-PROBE: NOT DETECTED
SARS-COV-2 RNA RESP QL NAA+PROBE: NOT DETECTED
SODIUM SERPL-SCNC: 138 MMOL/L (ref 136–145)
SQUAMOUS #/AREA URNS HPF: ABNORMAL /HPF
TROPONIN T % DELTA: 29
TROPONIN T NUMERIC DELTA: 8 NG/L
TROPONIN T SERPL HS-MCNC: 28 NG/L
TROPONIN T SERPL HS-MCNC: 36 NG/L
TSH SERPL DL<=0.05 MIU/L-ACNC: 3.72 UIU/ML (ref 0.27–4.2)
WBC # UR STRIP: ABNORMAL /HPF
WBC NRBC COR # BLD AUTO: 19.22 10*3/MM3 (ref 3.4–10.8)
WHOLE BLOOD HOLD COAG: NORMAL
WHOLE BLOOD HOLD SPECIMEN: NORMAL

## 2025-05-29 PROCEDURE — 70450 CT HEAD/BRAIN W/O DYE: CPT

## 2025-05-29 PROCEDURE — 84145 PROCALCITONIN (PCT): CPT | Performed by: NURSE PRACTITIONER

## 2025-05-29 PROCEDURE — 25010000002 LORAZEPAM PER 2 MG: Performed by: STUDENT IN AN ORGANIZED HEALTH CARE EDUCATION/TRAINING PROGRAM

## 2025-05-29 PROCEDURE — 83605 ASSAY OF LACTIC ACID: CPT | Performed by: STUDENT IN AN ORGANIZED HEALTH CARE EDUCATION/TRAINING PROGRAM

## 2025-05-29 PROCEDURE — 0202U NFCT DS 22 TRGT SARS-COV-2: CPT | Performed by: NURSE PRACTITIONER

## 2025-05-29 PROCEDURE — 25010000002 HEPARIN (PORCINE) PER 1000 UNITS: Performed by: FAMILY MEDICINE

## 2025-05-29 PROCEDURE — 99223 1ST HOSP IP/OBS HIGH 75: CPT | Performed by: FAMILY MEDICINE

## 2025-05-29 PROCEDURE — 83880 ASSAY OF NATRIURETIC PEPTIDE: CPT | Performed by: NURSE PRACTITIONER

## 2025-05-29 PROCEDURE — 36415 COLL VENOUS BLD VENIPUNCTURE: CPT

## 2025-05-29 PROCEDURE — 25810000003 SODIUM CHLORIDE 0.9 % SOLUTION: Performed by: NURSE PRACTITIONER

## 2025-05-29 PROCEDURE — 84443 ASSAY THYROID STIM HORMONE: CPT | Performed by: NURSE PRACTITIONER

## 2025-05-29 PROCEDURE — 25810000003 SODIUM CHLORIDE 0.9 % SOLUTION: Performed by: STUDENT IN AN ORGANIZED HEALTH CARE EDUCATION/TRAINING PROGRAM

## 2025-05-29 PROCEDURE — 87086 URINE CULTURE/COLONY COUNT: CPT | Performed by: STUDENT IN AN ORGANIZED HEALTH CARE EDUCATION/TRAINING PROGRAM

## 2025-05-29 PROCEDURE — 85025 COMPLETE CBC W/AUTO DIFF WBC: CPT | Performed by: STUDENT IN AN ORGANIZED HEALTH CARE EDUCATION/TRAINING PROGRAM

## 2025-05-29 PROCEDURE — 25810000003 SODIUM CHLORIDE 0.9 % SOLUTION: Performed by: FAMILY MEDICINE

## 2025-05-29 PROCEDURE — 80053 COMPREHEN METABOLIC PANEL: CPT | Performed by: STUDENT IN AN ORGANIZED HEALTH CARE EDUCATION/TRAINING PROGRAM

## 2025-05-29 PROCEDURE — 84484 ASSAY OF TROPONIN QUANT: CPT | Performed by: FAMILY MEDICINE

## 2025-05-29 PROCEDURE — 84484 ASSAY OF TROPONIN QUANT: CPT | Performed by: NURSE PRACTITIONER

## 2025-05-29 PROCEDURE — 25010000002 CEFTRIAXONE PER 250 MG: Performed by: NURSE PRACTITIONER

## 2025-05-29 PROCEDURE — 71045 X-RAY EXAM CHEST 1 VIEW: CPT

## 2025-05-29 PROCEDURE — 82330 ASSAY OF CALCIUM: CPT | Performed by: NURSE PRACTITIONER

## 2025-05-29 PROCEDURE — 87186 SC STD MICRODIL/AGAR DIL: CPT | Performed by: STUDENT IN AN ORGANIZED HEALTH CARE EDUCATION/TRAINING PROGRAM

## 2025-05-29 PROCEDURE — 87077 CULTURE AEROBIC IDENTIFY: CPT | Performed by: STUDENT IN AN ORGANIZED HEALTH CARE EDUCATION/TRAINING PROGRAM

## 2025-05-29 PROCEDURE — 87040 BLOOD CULTURE FOR BACTERIA: CPT | Performed by: NURSE PRACTITIONER

## 2025-05-29 PROCEDURE — 83735 ASSAY OF MAGNESIUM: CPT | Performed by: NURSE PRACTITIONER

## 2025-05-29 PROCEDURE — 81001 URINALYSIS AUTO W/SCOPE: CPT | Performed by: STUDENT IN AN ORGANIZED HEALTH CARE EDUCATION/TRAINING PROGRAM

## 2025-05-29 PROCEDURE — 93005 ELECTROCARDIOGRAM TRACING: CPT | Performed by: STUDENT IN AN ORGANIZED HEALTH CARE EDUCATION/TRAINING PROGRAM

## 2025-05-29 PROCEDURE — 84100 ASSAY OF PHOSPHORUS: CPT | Performed by: NURSE PRACTITIONER

## 2025-05-29 PROCEDURE — 99285 EMERGENCY DEPT VISIT HI MDM: CPT | Performed by: STUDENT IN AN ORGANIZED HEALTH CARE EDUCATION/TRAINING PROGRAM

## 2025-05-29 PROCEDURE — 99222 1ST HOSP IP/OBS MODERATE 55: CPT | Performed by: STUDENT IN AN ORGANIZED HEALTH CARE EDUCATION/TRAINING PROGRAM

## 2025-05-29 RX ORDER — SODIUM CHLORIDE 0.9 % (FLUSH) 0.9 %
10 SYRINGE (ML) INJECTION EVERY 12 HOURS SCHEDULED
Status: DISCONTINUED | OUTPATIENT
Start: 2025-05-29 | End: 2025-06-02 | Stop reason: HOSPADM

## 2025-05-29 RX ORDER — POLYETHYLENE GLYCOL 3350 17 G/17G
17 POWDER, FOR SOLUTION ORAL DAILY PRN
Status: DISCONTINUED | OUTPATIENT
Start: 2025-05-29 | End: 2025-06-02 | Stop reason: HOSPADM

## 2025-05-29 RX ORDER — IOPAMIDOL 612 MG/ML
100 INJECTION, SOLUTION INTRAVASCULAR
Status: DISCONTINUED | OUTPATIENT
Start: 2025-05-29 | End: 2025-05-29

## 2025-05-29 RX ORDER — HEPARIN SODIUM 5000 [USP'U]/ML
5000 INJECTION, SOLUTION INTRAVENOUS; SUBCUTANEOUS EVERY 12 HOURS SCHEDULED
Status: DISCONTINUED | OUTPATIENT
Start: 2025-05-29 | End: 2025-06-02 | Stop reason: HOSPADM

## 2025-05-29 RX ORDER — ONDANSETRON 2 MG/ML
4 INJECTION INTRAMUSCULAR; INTRAVENOUS EVERY 6 HOURS PRN
Status: DISCONTINUED | OUTPATIENT
Start: 2025-05-29 | End: 2025-06-02 | Stop reason: HOSPADM

## 2025-05-29 RX ORDER — ACETAMINOPHEN 325 MG/1
650 TABLET ORAL EVERY 4 HOURS PRN
Status: DISCONTINUED | OUTPATIENT
Start: 2025-05-29 | End: 2025-06-02 | Stop reason: HOSPADM

## 2025-05-29 RX ORDER — SODIUM CHLORIDE 9 MG/ML
100 INJECTION, SOLUTION INTRAVENOUS CONTINUOUS
Status: DISCONTINUED | OUTPATIENT
Start: 2025-05-29 | End: 2025-05-30

## 2025-05-29 RX ORDER — ATORVASTATIN CALCIUM 10 MG/1
10 TABLET, FILM COATED ORAL DAILY
Status: DISCONTINUED | OUTPATIENT
Start: 2025-05-29 | End: 2025-06-02 | Stop reason: HOSPADM

## 2025-05-29 RX ORDER — LORAZEPAM 2 MG/ML
2 INJECTION INTRAMUSCULAR EVERY 4 HOURS PRN
Status: DISCONTINUED | OUTPATIENT
Start: 2025-05-29 | End: 2025-06-02 | Stop reason: HOSPADM

## 2025-05-29 RX ORDER — SODIUM CHLORIDE 9 MG/ML
40 INJECTION, SOLUTION INTRAVENOUS AS NEEDED
Status: DISCONTINUED | OUTPATIENT
Start: 2025-05-29 | End: 2025-06-02 | Stop reason: HOSPADM

## 2025-05-29 RX ORDER — BISACODYL 10 MG
10 SUPPOSITORY, RECTAL RECTAL DAILY PRN
Status: DISCONTINUED | OUTPATIENT
Start: 2025-05-29 | End: 2025-06-02 | Stop reason: HOSPADM

## 2025-05-29 RX ORDER — SODIUM CHLORIDE 0.9 % (FLUSH) 0.9 %
10 SYRINGE (ML) INJECTION AS NEEDED
Status: DISCONTINUED | OUTPATIENT
Start: 2025-05-29 | End: 2025-06-02 | Stop reason: HOSPADM

## 2025-05-29 RX ORDER — NITROGLYCERIN 0.4 MG/1
0.4 TABLET SUBLINGUAL
Status: DISCONTINUED | OUTPATIENT
Start: 2025-05-29 | End: 2025-06-02 | Stop reason: HOSPADM

## 2025-05-29 RX ORDER — LORAZEPAM 2 MG/ML
1 INJECTION INTRAMUSCULAR ONCE
Status: COMPLETED | OUTPATIENT
Start: 2025-05-29 | End: 2025-05-29

## 2025-05-29 RX ORDER — ACETAMINOPHEN 650 MG/1
650 SUPPOSITORY RECTAL EVERY 4 HOURS PRN
Status: DISCONTINUED | OUTPATIENT
Start: 2025-05-29 | End: 2025-06-02 | Stop reason: HOSPADM

## 2025-05-29 RX ORDER — ACETAMINOPHEN 160 MG/5ML
650 SOLUTION ORAL EVERY 4 HOURS PRN
Status: DISCONTINUED | OUTPATIENT
Start: 2025-05-29 | End: 2025-06-02 | Stop reason: HOSPADM

## 2025-05-29 RX ORDER — LEVOTHYROXINE SODIUM 50 UG/1
50 TABLET ORAL DAILY
Status: DISCONTINUED | OUTPATIENT
Start: 2025-05-29 | End: 2025-06-02 | Stop reason: HOSPADM

## 2025-05-29 RX ORDER — MEMANTINE HYDROCHLORIDE 5 MG/1
10 TABLET ORAL 2 TIMES DAILY
Status: DISCONTINUED | OUTPATIENT
Start: 2025-05-29 | End: 2025-06-02 | Stop reason: HOSPADM

## 2025-05-29 RX ORDER — BISACODYL 5 MG/1
5 TABLET, DELAYED RELEASE ORAL DAILY PRN
Status: DISCONTINUED | OUTPATIENT
Start: 2025-05-29 | End: 2025-06-02 | Stop reason: HOSPADM

## 2025-05-29 RX ORDER — QUETIAPINE FUMARATE 25 MG/1
50 TABLET, FILM COATED ORAL NIGHTLY
Status: DISCONTINUED | OUTPATIENT
Start: 2025-05-29 | End: 2025-06-02 | Stop reason: HOSPADM

## 2025-05-29 RX ORDER — AMOXICILLIN 250 MG
2 CAPSULE ORAL 2 TIMES DAILY PRN
Status: DISCONTINUED | OUTPATIENT
Start: 2025-05-29 | End: 2025-06-02 | Stop reason: HOSPADM

## 2025-05-29 RX ORDER — RIVASTIGMINE 13.3 MG/24H
1 PATCH, EXTENDED RELEASE TRANSDERMAL DAILY
Status: DISCONTINUED | OUTPATIENT
Start: 2025-05-29 | End: 2025-06-02 | Stop reason: HOSPADM

## 2025-05-29 RX ADMIN — LORAZEPAM 1 MG: 2 INJECTION INTRAMUSCULAR; INTRAVENOUS at 09:02

## 2025-05-29 RX ADMIN — HEPARIN SODIUM 5000 UNITS: 5000 INJECTION INTRAVENOUS; SUBCUTANEOUS at 20:56

## 2025-05-29 RX ADMIN — SODIUM CHLORIDE 1000 ML: 9 INJECTION, SOLUTION INTRAVENOUS at 09:02

## 2025-05-29 RX ADMIN — Medication 10 ML: at 20:57

## 2025-05-29 RX ADMIN — SODIUM CHLORIDE 2361 ML: 9 INJECTION, SOLUTION INTRAVENOUS at 09:59

## 2025-05-29 RX ADMIN — SODIUM CHLORIDE 100 ML/HR: 9 INJECTION, SOLUTION INTRAVENOUS at 18:37

## 2025-05-29 RX ADMIN — SODIUM CHLORIDE 2000 MG: 9 INJECTION, SOLUTION INTRAVENOUS at 10:31

## 2025-05-29 NOTE — H&P
[facility] HOSPITALIST   HISTORY AND PHYSICAL      Name:  Cassie Gomez   Age:  70 y.o.  Sex:  female  :  1955  MRN:  [id number]   Visit Number:  [id number]  Admission Date:  2025  Date Of Service:  25  Primary Care Physician:  Mariano Gordon DO    Chief Complaint:     Seizure-like activity    History Of Presenting Illness:        Patient is 70 years old female with a past medical history of Alzheimer disease, depression, diabetes mellitus, hypertension, hyperlipidemia, hypothyroidism, presented to the ER from the nursing home with a chief complaint of seizure-like activity.  Patient was eating breakfast today prior to arrival and she suddenly started having convulsions that lasted around 3 minutes.  Nursing home staff. History otherwise limited with her history of Alzheimer dementia.  All history was obtained per ER and nursing home report.  Patient appears alert but keeping her eyes closed and nonverbal.    On ER evaluation, patient was tachycardic on arrival with a heart rate of 122 otherwise afebrile with stable blood pressure.  Workup in the ER was significant for HS Troponin of 28-36 with delta change of 8, creatinine 1.3, glucose 172, lactate 2.2, WBC 19.2.  Urinalysis consistent with infection.  Chest x-ray with no acute process.  Respiratory panel negative.  CT head without contrast showed Interval increase in size of ventricles which appears disproportionate to the sulci, normal pressure hydrocephalus not excluded.  Teleneurology Dr. Macias consulted and felt that her CT findings more consistent with brain atrophy, recommended admission for MRI and EEG.  Patient received Ativan 1 mg, total of 3.3 L of normal saline boluses and was started on Rocephin while in the ER.  Hospitalist consulted for admission, further management and treatment.    Review Of Systems:    All systems were reviewed and negative except as mentioned in history of presenting illness,  assessment and plan.    Past Medical History: Patient  has a past medical history of Alzheimer disease, Colon polyp, Depression, Diabetes mellitus (), Essential hypertension (2018), H/O bone density study (), Memory loss, Mixed hyperlipidemia (2017), Thyroid disease, and Thyroid enlargement.    Past Surgical History: Patient  has a past surgical history that includes  section (1978); Adenoidectomy; and Tonsillectomy.    Social History: Patient  reports that she has never smoked. She has never used smokeless tobacco. She reports that she does not drink alcohol and does not use drugs.    Family History:  Patient's family history has been reviewed and found to be noncontributory.     Allergies:      Bee venom, Ace inhibitors, and Venlafaxine    Home Medications:    Prior to Admission Medications       Prescriptions Last Dose Informant Patient Reported? Taking?    bisacodyl (Dulcolax) 10 MG suppository   Yes No    Insert 1 suppository into the rectum 2 (Two) Times a Day As Needed for Constipation.    Cholecalciferol (VITAMIN D3) 5000 UNITS capsule capsule   Yes No    Take 1 capsule by mouth Daily.    Cyanocobalamin (VITAMIN B-12) 500 MCG sublingual tablet   Yes No    Take 1 tablet by mouth Daily.    glipizide (GLUCOTROL) 5 MG tablet   Yes No    Take 1 tablet by mouth Daily.    glucose blood test strip   No No    1 each by Other route Daily. Use as instructed    levothyroxine (SYNTHROID, LEVOTHROID) 50 MCG tablet   No No    TAKE 1 TABLET BY MOUTH DAILY    losartan (COZAAR) 100 MG tablet   No No    TAKE 1 TABLET BY MOUTH DAILY    lovastatin (MEVACOR) 40 MG tablet   No No    Take 1 tablet by mouth Every Night.    memantine (NAMENDA) 10 MG tablet   No No    Take 1 tablet by mouth 2 (Two) Times a Day.    Multiple Vitamins-Minerals (PRESERVISION AREDS 2+MULTI VIT PO)   Yes No    QUEtiapine (SEROquel) 25 MG tablet   No No    Take 2 tablets by mouth Every Night.    rivastigmine (EXELON) 13.3  "MG/24HR patch   No No    Place 1 patch on the skin as directed by provider Daily.    senna 8.6 MG tablet   Yes No    Take 1 tablet by mouth 2 (Two) Times a Day As Needed for Constipation.    sertraline (ZOLOFT) 50 MG tablet   No No    Take 1 tablet by mouth Daily.    SF 5000 PLUS 1.1 % cream   Yes No    See Admin Instructions.    Sodium Phosphates (PHOSPHATE ENEMA RE)   Yes No    Insert 1 Application into the rectum 2 (Two) Times a Day As Needed.          ED Medications:    Medications   sodium chloride 0.9 % flush 10 mL (has no administration in time range)   cefTRIAXone (ROCEPHIN) 2,000 mg in sodium chloride 0.9 % 100 mL IVPB-VTB (2,000 mg Intravenous New Bag 5/29/25 1031)   LORazepam (ATIVAN) injection 1 mg (1 mg Intravenous Given 5/29/25 0902)   sodium chloride 0.9 % bolus 1,000 mL (1,000 mL Intravenous New Bag 5/29/25 0902)   sodium chloride 0.9 % bolus 2,361 mL (2,361 mL Intravenous New Bag 5/29/25 0959)     Vital Signs:  Temp:  [97.1 °F (36.2 °C)] 97.1 °F (36.2 °C)  Heart Rate:  [] 93  Resp:  [20] 20  BP: ()/(62-87) 134/80        05/29/25  0832   Weight: 78.7 kg (173 lb 8 oz)     Body mass index is 28.02 kg/m².    Physical Exam:     Most recent vital Signs: /80   Pulse 93   Temp 97.1 °F (36.2 °C) (Axillary)   Resp 20   Ht 167.6 cm (65.98\")   Wt 78.7 kg (173 lb 8 oz)   SpO2 99%   BMI 28.02 kg/m²     Physical Exam  Vitals and nursing note reviewed.   Constitutional:       General: She is not in acute distress.     Appearance: She is ill-appearing.   HENT:      Head: Normocephalic and atraumatic.      Right Ear: External ear normal.      Left Ear: External ear normal.      Nose: Nose normal.      Mouth/Throat:      Mouth: Mucous membranes are moist.   Eyes:      Extraocular Movements: Extraocular movements intact.      Conjunctiva/sclera: Conjunctivae normal.      Pupils: Pupils are equal, round, and reactive to light.   Cardiovascular:      Rate and Rhythm: Normal rate and regular " rhythm.      Pulses: Normal pulses.      Heart sounds: Normal heart sounds.   Pulmonary:      Effort: Pulmonary effort is normal. No respiratory distress.      Breath sounds: Normal breath sounds. No wheezing or rhonchi.   Abdominal:      General: Bowel sounds are normal. There is no distension.      Palpations: Abdomen is soft.      Tenderness: There is no abdominal tenderness.   Musculoskeletal:         General: Normal range of motion.      Cervical back: Normal range of motion and neck supple.      Right lower leg: No edema.      Left lower leg: No edema.   Skin:     General: Skin is warm and dry.      Findings: No rash.   Neurological:      General: No focal deficit present.      Mental Status: She is alert. She is disoriented and confused.      Motor: No weakness.   Psychiatric:      Comments: Demented         Laboratory data:    I have reviewed the labs done in the emergency room.    Results from last 7 days   Lab Units 05/29/25  0835   SODIUM mmol/L 138   POTASSIUM mmol/L 4.6   CHLORIDE mmol/L 101   CO2 mmol/L 23.1   BUN mg/dL 17.0   CREATININE mg/dL 1.33*   CALCIUM mg/dL 10.0   BILIRUBIN mg/dL 0.4   ALK PHOS U/L 107   ALT (SGPT) U/L 7   AST (SGOT) U/L 18   GLUCOSE mg/dL 174*     Results from last 7 days   Lab Units 05/29/25  0835   WBC 10*3/mm3 19.22*   HEMOGLOBIN g/dL 14.7   HEMATOCRIT % 47.1*   PLATELETS 10*3/mm3 340         Results from last 7 days   Lab Units 05/29/25  1008 05/29/25  0835   HSTROP T ng/L 36* 28*     Results from last 7 days   Lab Units 05/29/25  0835   PROBNP pg/mL 66.5                 Results from last 7 days   Lab Units 05/29/25  0922   COLOR UA  Yellow   GLUCOSE UA  Negative   KETONES UA  Trace*   BLOOD UA  Negative   LEUKOCYTES UA  Small (1+)*   PH, URINE  6.5   BILIRUBIN UA  Negative   UROBILINOGEN UA  0.2 E.U./dL   RBC UA /HPF 0-2   WBC UA /HPF 3-5*       Pain Management Panel  More data exists         5/21/2019 2/5/2018   Pain Management Panel   Creatinine, Urine 100  206.0         EKG:      Sinus tachycardia, heart rate 107, nonspecific ST/T wave changes.    Radiology:    CT Head Without Contrast  Result Date: 5/29/2025  PROCEDURE: CT HEAD WO CONTRAST-  HISTORY: seizure  COMPARISON: May 1, 2020..  TECHNIQUE: Multiple axial CT images were performed from the foramen magnum to the vertex. Individualized dose reduction techniques using automated exposure control or adjustment of mA and/or kV according to the patient size were employed.  FINDINGS: There is moderate generalized cerebral atrophy. The ventricles are moderately enlarged, mildly increased from prior exam and appear disproportionate to the prominent sulci. Normal pressure hydrocephalus is not excluded. Mild small vessel ischemic disease noted. There is no evidence of edema or hemorrhage.  No masses are identified. No extra-axial fluid is seen. The paranasal sinuses are unremarkable.      Interval increase in size of ventricles which appears disproportionate to the sulci, normal pressure hydrocephalus not excluded.     CTDI: 39.49 mGy DLP:726.89 mGy.cm  This report was signed and finalized on 5/29/2025 10:06 AM by Trinity Sanchez MD.      XR Chest 1 View  Result Date: 5/29/2025  PROCEDURE: XR CHEST 1 VW-  HISTORY: ams  COMPARISON: None.  FINDINGS: The heart is normal in size. The lungs are clear. The mediastinum is unremarkable. There is no pneumothorax.  There are no acute osseous abnormalities. Apical lordotic positioning noted.      No acute cardiopulmonary process.     This report was signed and finalized on 5/29/2025 9:42 AM by Trinity Sanchez MD.        Assessment:    Seizure-like activity, POA  Acute UTI, POA  Severe sepsis, secondary to #2, POA  Elevated troponin, likely demand ischemia, POA  Alzheimer dementia  Depression  Diabetes mellitus  Hypertension  Hyperlipidemia  Hypothyroidism    Plan:    Patient is admitted for further management and treatment.    Seizure-like activity  - New onset seizure-like activity likely provoked  and secondary to UTI and sepsis.  - Neurology consulted and following, appreciate recommendations.  - EEG ordered  - MRI brain with and without contrast ordered  - Hold on antiseizure medications unless EEG showing abnormal discharges  - Use Ativan 2 mg IV for tonic-clonic seizures lasting more than 3 minutes  - Seizure precautions   - Treatment of UTI and sepsis as per below    Acute UTI  Severe sepsis  -Met SIRS criteria with tachycardia and leukocytosis  -Received IV fluid boluses in the ER  -Continue with maintenance IV fluids  - Continue with Rocephin  - Follow-up on urine cultures and blood cultures    Alzheimer dementia  Hypertension  Hyperlipidemia  -Continue home meds as warranted.  -Holding on antihypertensives due to soft blood pressure    -Further orders as indicated per clinical course.    Risk Assessment: Moderate to high  DVT Prophylaxis: Heparin prophylaxis (benefit> risk)  Code Status: DNR/DNI  Diet: Advance as tolerated after dysphagia screen    Advance Care Planning   ACP discussion was held with the patient during this visit. Patient does not have an advance directive, information provided.       Beto Pierce MD  05/29/25  11:13 EDT    Dictated utilizing Dragon dictation.

## 2025-05-29 NOTE — PROGRESS NOTES
Nursing Home Progress Note        Mariano Gordon DO [x]  VALENTINA Novak []  [address] [place] [address]  Phone: [phone]  Fax: [phone] Mabel Aiken MD []  Aaron Ferreira DO []  [address] [place] [address]  Phone: [phone]  Fax: [phone]     PATIENT NAME: Cassie Gomez                                                                          YOB: 1955           DATE OF SERVICE: 5/7/2025  FACILITY: []  [place]  [] [place]  [x]  [facility] [place]  [] [facility]  []  Other ______________________________________________________________________     CHIEF COMPLAINT:  Impaired mobility and ADLs/Alzheimer's dementia/hypothyroidism/hypertension/diabetes mellitus/dyslipidemia/age-related physical debility/irritable bowel syndrome      HISTORY OF PRESENT ILLNESS:   [x]  Follow Up visit for coordination of long term care issues and chronic medical management of Diagnoses and all orders for this visit:    1. Impaired mobility and ADLs (Primary)    2. Age-related physical debility    3. Severe late onset Alzheimer's dementia with mood disturbance    4. Seizure disorder    5. Essential hypertension    6. Type 2 diabetes mellitus without complication, without long-term current use of insulin    7. Acquired hypothyroidism    Per nursing/staff report, patient remains receptive to supportive care; no new falls or injuries reported.    No reports of any cyclical/persistent hypoglycemic episodes.    Vital signs been stable, no increased work of breathing or complaints of chest pain.    No acute behavioral changes, although does demonstrate findings consistent with advanced Alzheimer's dementia.      PAST MEDICAL & SURGICAL HISTORY:   Past Medical History:   Diagnosis Date    Alzheimer disease     Colon polyp     Depression     Diabetes mellitus 2000    Essential hypertension 05/24/2018    H/O bone density study 2011    normal    Memory loss     Mixed hyperlipidemia  2017    Seizure 2025    Thyroid disease     Thyroid enlargement       Past Surgical History:   Procedure Laterality Date    ADENOIDECTOMY       SECTION  1978    TONSILLECTOMY           MEDICATIONS:  I have reviewed and reconciled the patients medication list in the patients chart at the skilled nursing facility today.      ALLERGIES:    Allergies   Allergen Reactions    Bee Venom Anaphylaxis    Ace Inhibitors Cough    Venlafaxine Other (See Comments)     insomnia         SOCIAL HISTORY:    Social History     Socioeconomic History    Marital status:    Tobacco Use    Smoking status: Never    Smokeless tobacco: Never   Vaping Use    Vaping status: Never Used   Substance and Sexual Activity    Alcohol use: No    Drug use: No    Sexual activity: Not Currently     Partners: Male     Birth control/protection: Post-menopausal       FAMILY HISTORY:    Family History   Problem Relation Age of Onset    Diabetes Mother     Heart disease Mother     Heart attack Mother     COPD Mother     Dementia Father     Diabetes Father     Stroke Father         In his Left eye    Squamous cell carcinoma Brother         oral       REVIEW OF SYSTEMS:    Review of Systems  Appetite: Fair [x]   Good []   Poor []   Weight Loss []  [x]  Weight Stable   Unavoidable Weight Loss []  Tolerating Tube Feeding []    Supplements Provided []   Patient is a poor historian given her advanced dementia, review of systems is obtained on direct consultation with her treating staff/nurse, as well as review of records.    PHYSICAL EXAMINATION:   VITAL SIGNS:   Vitals:    25 0829   BP: 118/68   Pulse: 68   Resp: 16   Temp: 97.8 °F (36.6 °C)   SpO2: 96%     BMI is >= 25 and <30. (Overweight) The following options were offered after discussion;: nutrition counseling/recommendations      Physical Exam    General Appearance:  [x]  Alert   [x]  Oriented x person  [x]  No acute distress     [x]  Confused  []  Disoriented   []   Comatose   Head:  Atraumatic and normocephalic, without obvious abnormality.   Eyes:         PERRLA, conjunctivae and sclerae normal, no Icterus. No pallor. Extra-occular movements are within normal limits.   Ears:  Ears appear intact with no abnormalities noted.   Throat: No oral lesions, no thrush, oral mucosa moist.   Neck: Supple, trachea midline, no thyromegaly, no carotid bruit.   Back:   No kyphoscoliosis. No tenderness to palpation.   Lungs:   Chest shape is normal. Breath sounds heard bilaterally equally.  No wheezing.  Audible air exchange noted all lung fields.   Heart:  Normal S1 and S2, no murmur, no gallop, no rub. No JVD.   Abdomen:   Normal bowel sounds, no masses, no organomegaly. Soft, non-tender, non-distended, no guarding    Extremities: Moves all extremities; without edema, cyanosis or clubbing.  Frail build.  Poor core strength/stability.   Pulses: Pulses palpable and equal bilaterally.   Skin: No bleeding or rash.  Generalized dry skin noted.  Age-related atrophy of skin.   Neurologic: [x] Normal speech []  Normal mental status    [x] Cranial nerves II through XII intact   [x]  No anosmia [x]  DTR 2+ [x]  Proprioception intact  [x]  No focal motor/sensory deficits      Psych/Mood:                    [x]  No acute changes []  Depressed        Urinary:      [x]  Continent  [x]  Incontinent, at times []  Retention  []  F/C      []  UTI w/treatment in progress         ASSESSMENT     Diagnoses and all orders for this visit:    1. Impaired mobility and ADLs (Primary)    2. Age-related physical debility    3. Severe late onset Alzheimer's dementia with mood disturbance    4. Seizure disorder    5. Essential hypertension    6. Type 2 diabetes mellitus without complication, without long-term current use of insulin    7. Acquired hypothyroidism          PLAN  Plan continuation of supportive care for mobilization/transfers, as well as all ADLs of need.  Continue to follow nutritional/hydration status, no  deficits reported at this time, avoid prolonged fasting periods as best able.    Continue current antiepileptic treatment regimen.    Changes to diabetic treatment regimen can be made when needed to maximize blood glucose control and minimize hypoglycemic episodes.  Continue blood glucose monitoring.    Continue thyroid supplementation, periodic thyroid function studies with dose adjustment when indicated.    Vital signs demonstrate hemodynamic stability, blood pressure is at goal.    Surveillance labs as per routine/needed.    [x]  Discussed Patient in detail with nursing/staff, addressed all needs today.     [x]  Plan of Care Reviewed   []  PT/OT Reviewed   []  Order Changes  []  Discharge Plans Reviewed   []  Code Status Changes    I spent 35 minutes caring for Cassie on this date of service. This time includes time spent by me in the following activities:preparing for the visit, performing a medically appropriate examination and/or evaluation , counseling and educating the patient/family/caregiver, ordering medications, tests, or procedures, documenting information in the medical record, and care coordination    I confirm accuracy of unchanged data/findings which have been carried forward from previous visit, as well as I have updated appropriately those that have changed.         Mariano Gordon DO  5/7/2025

## 2025-05-29 NOTE — ED PROVIDER NOTES
" EMERGENCY DEPARTMENT ENCOUNTER    Pt Name: Cassie Gomez  MRN: [id number]  Pt :   1955  Room Number:    Date of encounter:  2025  PCP: Mariano Gordon DO  ED Provider: VALENTINA Adkins    Historian: EMS      HPI:  Chief Complaint: Seizure-like activity        Context: Cassie Gomez is a 70 y.o. female who presents to the ED c/o seizure-like activity.  Patient arrives from [facility] and rehab via [facility] with a witnessed seizure-like activity.  Patient was at breakfast, per report she yelled, tensed and started shaking.  Episode lasted for 3 or 4 minutes.  EMT reports patient was \"tense\" during transport, no medications given prior.  No history of epilepsy.       PAST MEDICAL HISTORY  Past Medical History:   Diagnosis Date    Alzheimer disease     Colon polyp     Depression     Diabetes mellitus 2000    Essential hypertension 2018    H/O bone density study     normal    Memory loss     Mixed hyperlipidemia 2017    Seizure 2025    Thyroid disease     Thyroid enlargement          PAST SURGICAL HISTORY  Past Surgical History:   Procedure Laterality Date    ADENOIDECTOMY       SECTION  1978    TONSILLECTOMY           FAMILY HISTORY  Family History   Problem Relation Age of Onset    Diabetes Mother     Heart disease Mother     Heart attack Mother     COPD Mother     Dementia Father     Diabetes Father     Stroke Father         In his Left eye    Squamous cell carcinoma Brother         oral         SOCIAL HISTORY  Social History     Socioeconomic History    Marital status:    Tobacco Use    Smoking status: Never    Smokeless tobacco: Never   Vaping Use    Vaping status: Never Used   Substance and Sexual Activity    Alcohol use: No    Drug use: No    Sexual activity: Not Currently     Partners: Male     Birth control/protection: Post-menopausal         ALLERGIES  Bee venom, Ace inhibitors, and Venlafaxine        REVIEW OF SYSTEMS  Review " of Systems       All systems reviewed and negative except for those discussed in HPI.       PHYSICAL EXAM    I have reviewed the triage vital signs and nursing notes.    ED Triage Vitals   Temp Pulse Resp BP SpO2   -- -- -- -- --      Temp src Heart Rate Source Patient Position BP Location FiO2 (%)   -- -- -- -- --       Physical Exam  GENERAL:   Appears in no acute distress.  Chronically ill-appearing  HENT: Nares patent.  Dry mucous membranes, poor dentition, small amount of blood in the tip of the tongue  EYES: No scleral icterus.  Pupils reactive  CV: Regular rhythm, tachycardia  RESPIRATORY: Normal effort.  No audible wheezes, rales or rhonchi.  ABDOMEN: Soft, nontender  MUSCULOSKELETAL: No deformities.   NEURO: Responds to painful stimuli, moves all extremities, does not follow commands.  No facial droop  SKIN: Warm, dry, no rash visualized.  Pale      LAB RESULTS  Recent Results (from the past 24 hours)   Green Top (Gel)    Collection Time: 05/29/25  8:35 AM   Result Value Ref Range    Extra Tube Hold for add-ons.    Lavender Top    Collection Time: 05/29/25  8:35 AM   Result Value Ref Range    Extra Tube hold for add-on    Gold Top - SST    Collection Time: 05/29/25  8:35 AM   Result Value Ref Range    Extra Tube Hold for add-ons.    Light Blue Top    Collection Time: 05/29/25  8:35 AM   Result Value Ref Range    Extra Tube Hold for add-ons.    Comprehensive Metabolic Panel    Collection Time: 05/29/25  8:35 AM    Specimen: Blood   Result Value Ref Range    Glucose 174 (H) 65 - 99 mg/dL    BUN 17.0 8.0 - 23.0 mg/dL    Creatinine 1.33 (H) 0.57 - 1.00 mg/dL    Sodium 138 136 - 145 mmol/L    Potassium 4.6 3.5 - 5.2 mmol/L    Chloride 101 98 - 107 mmol/L    CO2 23.1 22.0 - 29.0 mmol/L    Calcium 10.0 8.6 - 10.5 mg/dL    Total Protein 7.6 6.0 - 8.5 g/dL    Albumin 4.0 3.5 - 5.2 g/dL    ALT (SGPT) 7 1 - 33 U/L    AST (SGOT) 18 1 - 32 U/L    Alkaline Phosphatase 107 39 - 117 U/L    Total Bilirubin 0.4 0.0 - 1.2  mg/dL    Globulin 3.6 gm/dL    A/G Ratio 1.1 g/dL    BUN/Creatinine Ratio 12.8 7.0 - 25.0    Anion Gap 13.9 5.0 - 15.0 mmol/L    eGFR 42.9 (L) >60.0 mL/min/1.73   High Sensitivity Troponin T    Collection Time: 05/29/25  8:35 AM    Specimen: Blood   Result Value Ref Range    HS Troponin T 28 (H) <14 ng/L   BNP    Collection Time: 05/29/25  8:35 AM    Specimen: Blood   Result Value Ref Range    proBNP 66.5 0.0 - 900.0 pg/mL   CBC Auto Differential    Collection Time: 05/29/25  8:35 AM    Specimen: Blood   Result Value Ref Range    WBC 19.22 (H) 3.40 - 10.80 10*3/mm3    RBC 5.23 3.77 - 5.28 10*6/mm3    Hemoglobin 14.7 12.0 - 15.9 g/dL    Hematocrit 47.1 (H) 34.0 - 46.6 %    MCV 90.1 79.0 - 97.0 fL    MCH 28.1 26.6 - 33.0 pg    MCHC 31.2 (L) 31.5 - 35.7 g/dL    RDW 14.1 12.3 - 15.4 %    RDW-SD 46.5 37.0 - 54.0 fl    MPV 10.4 6.0 - 12.0 fL    Platelets 340 140 - 450 10*3/mm3    Neutrophil % 63.9 42.7 - 76.0 %    Lymphocyte % 28.0 19.6 - 45.3 %    Monocyte % 5.2 5.0 - 12.0 %    Eosinophil % 1.0 0.3 - 6.2 %    Basophil % 0.7 0.0 - 1.5 %    Immature Grans % 1.2 (H) 0.0 - 0.5 %    Neutrophils, Absolute 12.28 (H) 1.70 - 7.00 10*3/mm3    Lymphocytes, Absolute 5.39 (H) 0.70 - 3.10 10*3/mm3    Monocytes, Absolute 1.00 (H) 0.10 - 0.90 10*3/mm3    Eosinophils, Absolute 0.19 0.00 - 0.40 10*3/mm3    Basophils, Absolute 0.13 0.00 - 0.20 10*3/mm3    Immature Grans, Absolute 0.23 (H) 0.00 - 0.05 10*3/mm3    nRBC 0.0 0.0 - 0.2 /100 WBC   TSH    Collection Time: 05/29/25  8:35 AM    Specimen: Blood   Result Value Ref Range    TSH 3.720 0.270 - 4.200 uIU/mL   Magnesium    Collection Time: 05/29/25  8:35 AM    Specimen: Blood   Result Value Ref Range    Magnesium 2.2 1.6 - 2.4 mg/dL   Urinalysis With Culture If Indicated - Urine, Catheter    Collection Time: 05/29/25  9:22 AM    Specimen: Urine, Catheter   Result Value Ref Range    Color, UA Yellow Yellow, Straw    Appearance, UA Turbid (A) Clear    pH, UA 6.5 5.0 - 8.0    Specific  Gravity, UA 1.017 1.005 - 1.030    Glucose, UA Negative Negative    Ketones, UA Trace (A) Negative    Bilirubin, UA Negative Negative    Blood, UA Negative Negative    Protein, UA Trace (A) Negative    Leuk Esterase, UA Small (1+) (A) Negative    Nitrite, UA Positive (A) Negative    Urobilinogen, UA 0.2 E.U./dL 0.2 - 1.0 E.U./dL   Urinalysis, Microscopic Only - Urine, Catheter    Collection Time: 05/29/25  9:22 AM    Specimen: Urine, Catheter   Result Value Ref Range    RBC, UA 0-2 None Seen, 0-2 /HPF    WBC, UA 3-5 (A) None Seen, 0-2 /HPF    Bacteria, UA 4+ (A) None Seen /HPF    Squamous Epithelial Cells, UA 7-12 (A) None Seen, 0-2 /HPF    Hyaline Casts, UA None Seen None Seen /LPF    Methodology Manual Light Microscopy    Lactic Acid, Plasma    Collection Time: 05/29/25  9:23 AM    Specimen: Blood   Result Value Ref Range    Lactate 2.2 (C) 0.5 - 2.0 mmol/L   Respiratory Panel PCR w/COVID-19(SARS-CoV-2) JAYY/DUSTY/MIKE/PAD/COR/STEFANO In-House, NP Swab in UTM/VTM, 2 HR TAT - Swab, Nasopharynx    Collection Time: 05/29/25  9:55 AM    Specimen: Nasopharynx; Swab   Result Value Ref Range    ADENOVIRUS, PCR Not Detected Not Detected    Coronavirus 229E Not Detected Not Detected    Coronavirus HKU1 Not Detected Not Detected    Coronavirus NL63 Not Detected Not Detected    Coronavirus OC43 Not Detected Not Detected    COVID19 Not Detected Not Detected - Ref. Range    Human Metapneumovirus Not Detected Not Detected    Human Rhinovirus/Enterovirus Not Detected Not Detected    Influenza A PCR Not Detected Not Detected    Influenza B PCR Not Detected Not Detected    Parainfluenza Virus 1 Not Detected Not Detected    Parainfluenza Virus 2 Not Detected Not Detected    Parainfluenza Virus 3 Not Detected Not Detected    Parainfluenza Virus 4 Not Detected Not Detected    RSV, PCR Not Detected Not Detected    Bordetella pertussis pcr Not Detected Not Detected    Bordetella parapertussis PCR Not Detected Not Detected    Chlamydophila  pneumoniae PCR Not Detected Not Detected    Mycoplasma pneumo by PCR Not Detected Not Detected   High Sensitivity Troponin T 1Hr    Collection Time: 05/29/25 10:08 AM    Specimen: Blood   Result Value Ref Range    HS Troponin T 36 (H) <14 ng/L    Troponin T Numeric Delta 8 (C) Abnormal if >/=3 ng/L    Troponin T % Delta 29 (C) Abnormal if >/= 20%   Procalcitonin    Collection Time: 05/29/25 10:08 AM    Specimen: Blood   Result Value Ref Range    Procalcitonin 0.03 0.00 - 0.25 ng/mL       If labs were ordered, I independently reviewed the results and considered them in treating the patient.        RADIOLOGY  CT Head Without Contrast  Result Date: 5/29/2025  PROCEDURE: CT HEAD WO CONTRAST-  HISTORY: seizure  COMPARISON: May 1, 2020..  TECHNIQUE: Multiple axial CT images were performed from the foramen magnum to the vertex. Individualized dose reduction techniques using automated exposure control or adjustment of mA and/or kV according to the patient size were employed.  FINDINGS: There is moderate generalized cerebral atrophy. The ventricles are moderately enlarged, mildly increased from prior exam and appear disproportionate to the prominent sulci. Normal pressure hydrocephalus is not excluded. Mild small vessel ischemic disease noted. There is no evidence of edema or hemorrhage.  No masses are identified. No extra-axial fluid is seen. The paranasal sinuses are unremarkable.      Interval increase in size of ventricles which appears disproportionate to the sulci, normal pressure hydrocephalus not excluded.     CTDI: 39.49 mGy DLP:726.89 mGy.cm  This report was signed and finalized on 5/29/2025 10:06 AM by Trinity Sanchez MD.      XR Chest 1 View  Result Date: 5/29/2025  PROCEDURE: XR CHEST 1 VW-  HISTORY: ams  COMPARISON: None.  FINDINGS: The heart is normal in size. The lungs are clear. The mediastinum is unremarkable. There is no pneumothorax.  There are no acute osseous abnormalities. Apical lordotic positioning  noted.      No acute cardiopulmonary process.     This report was signed and finalized on 5/29/2025 9:42 AM by Trinity Sanchez MD.        I ordered and independently reviewed the above noted radiographic studies.      I viewed images of chest x-ray which showed no lobar pneumonia per my independent interpretation.    See radiologist's dictation for official interpretation.        PROCEDURES    Procedures    ECG 12 Lead Altered Mental Status   Final Result          MEDICATIONS GIVEN IN ER    Medications   sodium chloride 0.9 % flush 10 mL (has no administration in time range)   cefTRIAXone (ROCEPHIN) 2,000 mg in sodium chloride 0.9 % 100 mL IVPB-VTB (2,000 mg Intravenous New Bag 5/29/25 1031)   LORazepam (ATIVAN) injection 1 mg (1 mg Intravenous Given 5/29/25 0902)   sodium chloride 0.9 % bolus 1,000 mL (1,000 mL Intravenous New Bag 5/29/25 0902)   sodium chloride 0.9 % bolus 2,361 mL (2,361 mL Intravenous New Bag 5/29/25 0959)         MEDICAL DECISION MAKING, PROGRESS, and CONSULTS    All labs, if obtained, have been independently reviewed by me.  All radiology studies, if obtained, have been reviewed by me and the radiologist dictating the report.  All EKG's, if obtained, have been independently viewed and interpreted by me/my attending physician.      Discussion below represents my analysis of pertinent findings related to patient's condition, differential diagnosis, treatment plan and final disposition.  Patient is 70-year-old female with history of Alzheimer's disease, hypertension, diabetes thyroid disease, depression who presented from [facility] and rehab via [facility] for seizure-like activity.  On physical exam she was chronically ill-appearing, alert and oriented x 0, reactive pupils, no facial droop, no active seizure.  Lab work significant for elevated lactate 2.2, leukocytosis 19.2, creatinine 1.33, normal procalcitonin 0.031, mildly elevated troponin 28 and trending upwards 36, sinus tachycardia  on EKG, no STEMI.  Patient received fluids and antibiotics per sepsis protocol, suspected urinary source of infection.  CT of the brain shows interval increase in size of ventricles.  Neurology consulted changes on CT of the brain due to progression of brain atrophy and ex vacuo dilation, no active seizure activity.  Hospital services consulted for admission.                       Differential diagnosis:    Seizure, convulsive syncope, altered mental status, sepsis, SIRS, UTI, urosepsis, ICH, CVA      Additional sources:    - Discussed/ obtained information from independent historians: EMS    - External (non-ED) record review: 3/5/2025, progress note with family medicine    - Chronic or social conditions impacting care: Dementia, nursing home resident    - Shared decision making:        Orders placed during this visit:  Orders Placed This Encounter   Procedures    Blood Culture - Blood,    Blood Culture - Blood,    Respiratory Panel PCR w/COVID-19(SARS-CoV-2) JAYY/DUSTY/MIKE/PAD/COR/STEFANO In-House, NP Swab in UTM/VTM, 2 HR TAT - Swab, Nasopharynx    XR Chest 1 View    CT Head Without Contrast    MRI Brain With & Without Contrast    [facility] Draw    Comprehensive Metabolic Panel    Lactic Acid, Plasma    Urinalysis With Culture If Indicated - Urine, Catheter    High Sensitivity Troponin T    BNP    CBC Auto Differential    TSH    Magnesium    High Sensitivity Troponin T 1Hr    Urinalysis, Microscopic Only - Urine, Clean Catch    Procalcitonin    STAT Lactic Acid, Reflex    Phosphorus    Calcium, Ionized    NPO Diet NPO Type: Strict NPO    Continuous Pulse Oximetry    Vital Signs    Inpatient Neurology Consult General    Oxygen Therapy- Nasal Cannula; Titrate 1-6 LPM Per SpO2; 90 - 95%    POC Glucose Once    ECG 12 Lead Altered Mental Status    EEG    Insert Peripheral IV    Inpatient Admission    Seizure Precautions    Seizure Precautions    Green Top (Gel)    Lavender Top    Gold Top - SST    Light Blue Top    CBC &  Differential         Additional orders considered but not ordered:      ED Course:    Consultants:      ED Course as of 05/29/25 1208   u May 29, 2025   0840 I have interviewed and examined the patient FACE-TO-FACE.  I reviewed the mid-level provider(s) note and agree with the clinical impression, plan, and disposition unless otherwise stated in the MDM below.    ATTENDING ATTESTATION  HPI: 70 y.o. female who presents following witnessed seizure-like activity this morning while eating breakfast at nursing home. Seizure activity has since stopped. Vital signs stable en route.     Pmhx of dementia and nonverbal at baseline. No history of seizures. No reported trauma.    Patient is DNR.    EXAM:   General: Appears asleep, chronically ill, and nontoxic. No acute distress.  Head: Normocephalic.  Atraumatic.  Eyes: Pupils 2 mm.  PERRLA.   ENT: Airway is patent. Small abrasion left side of tongue.  Cardiovascular: Regular rate and rhythm.  No murmurs.  No rubs.  2+ distal pulses bilaterally.  Respiratory: No wheezing. No rales.  No rhonchi.  GI: Abdomen is soft.  Nondistended.  Nontender to palpation.  No rebound.  No guarding.  No CVA tenderness.  Neurologic: GCS 7 (1/1/5).  Skin: No edema. No erythema. No pallor. No cyanosis. No signs of external trauma.             [JS]   0848 ECG 12 Lead Altered Mental Status  EKG per my interpretation sinus tachycardia, rate 107, normal axis, no STEMI, normal QRS QTC intervals.   [JS]   0850 WBC(!): 19.22 [IR]   0939 Creatinine(!): 1.33  Mild renal insufficiency [IR]   1019 Lactate(!!): 2.2 [IR]   1031 Spoke with teleneurology, Dr. Macias,  advised to order ionized calcium, phosphorus, stat EEG, brain MRI, will see patient in ER [IR]   1040 Dr. Macias assessed patient at bedside, low concern for ongoing seizure, advised MRI and EEG, baseline oriented x 0 from previous neurology notes, no hydrocephalus, more so progression of brain atrophy and ex-vacuo dilation [IR]      ED  Course User Index  [IR] Ramonita Wolff, VALENTINA  [JS] Gopal Carmichael DO              Shared Decision Making:  After my consideration of clinical presentation and any laboratory/radiology studies obtained, I discussed the findings with the patient/patient representative who is in agreement with the treatment plan and the final disposition.   Risks and benefits of discharge and/or observation/admission were discussed.       AS OF 12:08 EDT VITALS:    BP - 134/80  HR - 93  TEMP - 97.1 °F (36.2 °C) (Axillary)  O2 SATS - 99%                  DIAGNOSIS  Final diagnoses:   Altered mental status, unspecified altered mental status type   Seizure-like activity   UTI (urinary tract infection) with pyuria   Leukocytosis, unspecified type   Elevated lactic acid level   Elevated troponin         DISPOSITION  admit      Please note that portions of this document were completed with voice recognition software.     VALENTINA Adkins   05/29/25   12:08 EDT        Ramonita Wolff APRN  05/29/25 1204

## 2025-05-29 NOTE — PLAN OF CARE
Goal Outcome Evaluation:  Plan of Care Reviewed With: patient         Interventions implemented as appropriate

## 2025-05-29 NOTE — PROGRESS NOTES
Pharmacokinetic Consult - Ceftriaxone Dosing  Cassie Gomez is a 70 y.o. female who has been consulted to dose Ceftriaxone for UTI.    Current Antimicrobial Therapy    Anti-Infectives (From admission, onward)      Ordered     Dose/Rate Route Frequency Start Stop    05/29/25 1638  Pharmacy To Dose: Ceftriaxone        Ordering Provider: Beto Pierce MD     Not Applicable Continuous PRN 05/29/25 1638 06/03/25 1637    05/29/25 0949  cefTRIAXone (ROCEPHIN) 2,000 mg in sodium chloride 0.9 % 100 mL IVPB-VTB        Ordering Provider: Beto Pierce MD    2,000 mg  200 mL/hr over 30 Minutes Intravenous Every 24 Hours 05/29/25 1005 06/03/25 1000    05/29/25 0949  cefTRIAXone (ROCEPHIN) 2,000 mg in sodium chloride 0.9 % 100 mL IVPB-VTB  Status:  Discontinued        Ordering Provider: Ramonita Wolff APRN    2,000 mg  200 mL/hr over 30 Minutes Intravenous Every 24 Hours 05/29/25 1005 05/29/25 0949            Microbiology Results (last 10 days)       Procedure Component Value - Date/Time    Respiratory Panel PCR w/COVID-19(SARS-CoV-2) JAYY/DUSTY/MIKE/PAD/COR/STEFANO In-House, NP Swab in UTM/VTM, 2 HR TAT - Swab, Nasopharynx [[id number]]  (Normal) Collected: 05/29/25 0955    Lab Status: Final result Specimen: Swab from Nasopharynx Updated: 05/29/25 1132     ADENOVIRUS, PCR Not Detected     Coronavirus 229E Not Detected     Coronavirus HKU1 Not Detected     Coronavirus NL63 Not Detected     Coronavirus OC43 Not Detected     COVID19 Not Detected     Human Metapneumovirus Not Detected     Human Rhinovirus/Enterovirus Not Detected     Influenza A PCR Not Detected     Influenza B PCR Not Detected     Parainfluenza Virus 1 Not Detected     Parainfluenza Virus 2 Not Detected     Parainfluenza Virus 3 Not Detected     Parainfluenza Virus 4 Not Detected     RSV, PCR Not Detected     Bordetella pertussis pcr Not Detected     Bordetella parapertussis PCR Not Detected     Chlamydophila pneumoniae PCR Not Detected     Mycoplasma pneumo by  PCR Not Detected    Narrative:      In the setting of a positive respiratory panel with a viral infection PLUS a negative procalcitonin without other underlying concern for bacterial infection, consider observing off antibiotics or discontinuation of antibiotics and continue supportive care. If the respiratory panel is positive for atypical bacterial infection (Bordetella pertussis, Chlamydophila pneumoniae, or Mycoplasma pneumoniae), consider antibiotic de-escalation to target atypical bacterial infection.             Allergies  Bee venom, Ace inhibitors, and Venlafaxine    Relevant clinical data and objective history reviewed:  Creatinine   Date Value Ref Range Status   05/29/2025 1.33 (H) 0.57 - 1.00 mg/dL Final     Estimated Creatinine Clearance: 43.1 mL/min (A) (by C-G formula based on SCr of 1.33 mg/dL (H)).  No intake/output data recorded.  Patient weight: 79.9 kg (176 lb 2.4 oz)    Asessment/Plan  Initiate Ceftriaxone 1g IV every 24 hours  Pharmacy will monitor Ms. Gomez's renal function and clinical status and adjust the Ceftriaxone dose and/or frequency as needed.    Thanks,   Judy Gaona, PharmD  5/29/2025  16:41 EDT

## 2025-05-29 NOTE — CONSULTS
"Neurology Consult Note    Consult Date: 5/29/2025    Referring MD: No ref. provider found    Reason for Consult I have been asked to see the patient in neurological consultation to render advice and opinion regarding new onset seizure-like activity.    History as per chart review due to patient altered mental status and severe dementia at baseline.  Cassie Gomez is a 70 y.o. white female with known diagnosis of type 2 diabetes, mixed hyperlipidemia, thyroid disease, depression, Alzheimer disease with advanced dementia diagnosed in 2016 on [place], from her prior neurology notes in 2023 at baseline the patient oriented x 0 and does not follow commands.  Reportedly at her nursing home she had witnessed seizure-like activity that lasted for 3 to 4 minutes and?  Biting her tongue, no reported prior history of seizures.  Her workup on this admission showed possible urinary tract infection with WBC in urine 3-5, bacteria 4+, 1+ leukocyte esterase, nitrite positive.  Her CT scan of the head read as possible normal pressure hydrocephalus compared to prior CT head in 2020.  I do not think this is a normal pressure hydrocephalus and I believe this is progression of her brain atrophy causing ex-vacuo brain dilatation as her prior CT head in 2020 also showed brain atrophy and ventricular dilatation although was to a lesser extent.  Currently the patient seems at her baseline.     Past Medical History:   Diagnosis Date    Alzheimer disease     Colon polyp     Depression     Diabetes mellitus 2000    Essential hypertension 05/24/2018    H/O bone density study 2011    normal    Memory loss     Mixed hyperlipidemia 07/06/2017    Thyroid disease     Thyroid enlargement        Exam  /73   Pulse 85   Temp 97.1 °F (36.2 °C) (Axillary)   Resp 20   Ht 167.6 cm (65.98\")   Wt 78.7 kg (173 lb 8 oz)   SpO2 96%   BMI 28.02 kg/m²   Gen: NAD, vitals reviewed  MS: oriented x0 which is her baseline, did not follow any commands, " recent/remote memory impaired, poor attention/concentration, unable to assess language due to patient condition, unable to assess neglect.  CN: Her eyes tightly closed and resists eye opening, pupils equal and reactive to the light bilaterally, no obvious facial droop to supraorbital pain, unable to assess dysarthria  Motor: Spontaneously moving lower extremities, withdrew to pain on the upper extremities with less withdrawal on the right upper extremity  Sensory exam: Unable to assess    DATA:    Lab Results   Component Value Date    GLUCOSE 174 (H) 05/29/2025    CALCIUM 10.0 05/29/2025     05/29/2025    K 4.6 05/29/2025    CO2 23.1 05/29/2025     05/29/2025    BUN 17.0 05/29/2025    CREATININE 1.33 (H) 05/29/2025    EGFRIFAFRI 77 12/01/2020    EGFRIFNONA 63 12/01/2020    BCR 12.8 05/29/2025    ANIONGAP 13.9 05/29/2025     Lab Results   Component Value Date    WBC 19.22 (H) 05/29/2025    HGB 14.7 05/29/2025    HCT 47.1 (H) 05/29/2025    MCV 90.1 05/29/2025     05/29/2025       Lab review: Possible urinary tract infection, glucose 174, BUN 17, creatinine 1.3, WBC 19.2, magnesium 2.2, TSH 3.72.    Imaging review: I personally reviewed her CT scan of the head read by radiologist as possible normal pressure hydrocephalus compared to prior CT head in 2020.  I do not think this is a normal pressure hydrocephalus and I believe this is progression of her brain atrophy causing ex-vacuo brain dilatation as her prior CT head in 2020 also showed brain atrophy and ventricular dilatation although was to a lesser extent.    Diagnoses:  - New onset seizure-like activity likely provoked secondary to urinary tract infection and metabolic abnormalities  - Advanced Alzheimer dementia  - Abnormal CT scan of the head with advanced brain atrophy, central volume loss and ex-vacuo ventricular dilatation  - Possible urinary tract infection      PLAN:   - Routine EEG  - MRI brain with and without contrast  - Check  "phosphorus, ionized calcium  - Hold on antiseizure medications unless EEG showing abnormal discharges  - Use Ativan 2 mg IV for tonic-clonic seizures lasting more than 3 minutes  - Seizure precautions as detailed below  - Treatment of UTI and metabolic abnormalities as per the primary    Discussed with ER physician Dr. Carmichael via epic chat.    Seizure Precautions:  ·    Do not drive 90 days. (this is to include: car, bicycle, or motorcycle)  ·    Do not drive operate dangerous equipment such as power tools, heavy machinery with moving parts, or an open flame.  ·    Do not swim alone (this would include a bathtub full of water is a small swimming pool).  ·    Avoid unsecured heights. (this is to include: scaffolding, ladders, trees, and roofs).    Medical Decision Making for this neurology consultation consists of the following:  Review of previous chart, including H/P, provider and nursing notes as applicable.  Review of medications and vital signs.  Review of previous labs, neuroimaging, and additional relevant diagnostics.   Interpretation of laboratory, imaging, and other diagnostic results  Discussion with other providers and family   Total face-to-face/floor time: 30-61 minutes.       This was an audio and video visit enabled telemedicine encounter.    The consult was conducted in real time using interactive audio and video technology. Patient/Family was informed of the technology being used for this visit and agreed proceed. Patient located in hospital at [facility] and I'm the provider located at home/office based setting in [place], [place].    \"Dictated utilizing Dragon dictation\".       "

## 2025-05-30 ENCOUNTER — APPOINTMENT (OUTPATIENT)
Dept: MRI IMAGING | Facility: HOSPITAL | Age: 70
DRG: 690 | End: 2025-05-30
Payer: MEDICARE

## 2025-05-30 LAB
ANION GAP SERPL CALCULATED.3IONS-SCNC: 11.4 MMOL/L (ref 5–15)
BILIRUB UR QL STRIP: NEGATIVE
BUN SERPL-MCNC: 11 MG/DL (ref 8–23)
BUN/CREAT SERPL: 9 (ref 7–25)
CALCIUM SPEC-SCNC: 9.2 MG/DL (ref 8.6–10.5)
CHLORIDE SERPL-SCNC: 108 MMOL/L (ref 98–107)
CLARITY UR: ABNORMAL
CO2 SERPL-SCNC: 20.6 MMOL/L (ref 22–29)
COLOR UR: YELLOW
CREAT SERPL-MCNC: 1.22 MG/DL (ref 0.57–1)
DEPRECATED RDW RBC AUTO: 43.8 FL (ref 37–54)
EGFRCR SERPLBLD CKD-EPI 2021: 47.5 ML/MIN/1.73
ERYTHROCYTE [DISTWIDTH] IN BLOOD BY AUTOMATED COUNT: 13.7 % (ref 12.3–15.4)
GLUCOSE SERPL-MCNC: 123 MG/DL (ref 65–99)
GLUCOSE UR STRIP-MCNC: NEGATIVE MG/DL
HCT VFR BLD AUTO: 44.8 % (ref 34–46.6)
HGB BLD-MCNC: 14.2 G/DL (ref 12–15.9)
HGB UR QL STRIP.AUTO: NEGATIVE
KETONES UR QL STRIP: ABNORMAL
LEUKOCYTE ESTERASE UR QL STRIP.AUTO: ABNORMAL
MCH RBC QN AUTO: 27.7 PG (ref 26.6–33)
MCHC RBC AUTO-ENTMCNC: 31.7 G/DL (ref 31.5–35.7)
MCV RBC AUTO: 87.3 FL (ref 79–97)
NITRITE UR QL STRIP: POSITIVE
PH UR STRIP.AUTO: 6.5 [PH] (ref 5–8)
PLATELET # BLD AUTO: 205 10*3/MM3 (ref 140–450)
PMV BLD AUTO: 10.9 FL (ref 6–12)
POTASSIUM SERPL-SCNC: 4.1 MMOL/L (ref 3.5–5.2)
PROT UR QL STRIP: ABNORMAL
RBC # BLD AUTO: 5.13 10*6/MM3 (ref 3.77–5.28)
SODIUM SERPL-SCNC: 140 MMOL/L (ref 136–145)
SP GR UR STRIP: 1.02 (ref 1–1.03)
TROPONIN T SERPL HS-MCNC: 29 NG/L
UROBILINOGEN UR QL STRIP: ABNORMAL
WBC NRBC COR # BLD AUTO: 9.39 10*3/MM3 (ref 3.4–10.8)

## 2025-05-30 PROCEDURE — 99233 SBSQ HOSP IP/OBS HIGH 50: CPT | Performed by: STUDENT IN AN ORGANIZED HEALTH CARE EDUCATION/TRAINING PROGRAM

## 2025-05-30 PROCEDURE — 84484 ASSAY OF TROPONIN QUANT: CPT | Performed by: FAMILY MEDICINE

## 2025-05-30 PROCEDURE — 85027 COMPLETE CBC AUTOMATED: CPT | Performed by: FAMILY MEDICINE

## 2025-05-30 PROCEDURE — 92610 EVALUATE SWALLOWING FUNCTION: CPT

## 2025-05-30 PROCEDURE — 25010000002 CEFTRIAXONE PER 250 MG: Performed by: FAMILY MEDICINE

## 2025-05-30 PROCEDURE — 80048 BASIC METABOLIC PNL TOTAL CA: CPT | Performed by: FAMILY MEDICINE

## 2025-05-30 PROCEDURE — 25810000003 SODIUM CHLORIDE 0.9 % SOLUTION: Performed by: NURSE PRACTITIONER

## 2025-05-30 PROCEDURE — 25510000001 GADOPICLENOL 0.5 MMOL/ML SOLUTION: Performed by: INTERNAL MEDICINE

## 2025-05-30 PROCEDURE — 99232 SBSQ HOSP IP/OBS MODERATE 35: CPT | Performed by: NURSE PRACTITIONER

## 2025-05-30 PROCEDURE — A9573 GADOPICLENOL 0.5 MMOL/ML SOLUTION: HCPCS | Performed by: INTERNAL MEDICINE

## 2025-05-30 PROCEDURE — 70553 MRI BRAIN STEM W/O & W/DYE: CPT

## 2025-05-30 PROCEDURE — 25010000002 HEPARIN (PORCINE) PER 1000 UNITS: Performed by: FAMILY MEDICINE

## 2025-05-30 PROCEDURE — 25010000002 LORAZEPAM PER 2 MG: Performed by: NURSE PRACTITIONER

## 2025-05-30 RX ORDER — SODIUM CHLORIDE 9 MG/ML
100 INJECTION, SOLUTION INTRAVENOUS CONTINUOUS
Status: ACTIVE | OUTPATIENT
Start: 2025-05-30 | End: 2025-05-31

## 2025-05-30 RX ORDER — LORAZEPAM 2 MG/ML
0.5 INJECTION INTRAMUSCULAR ONCE
Status: COMPLETED | OUTPATIENT
Start: 2025-05-30 | End: 2025-05-30

## 2025-05-30 RX ORDER — RIVASTIGMINE TARTRATE 3 MG/1
3 CAPSULE ORAL 2 TIMES DAILY
COMMUNITY

## 2025-05-30 RX ADMIN — Medication 10 ML: at 09:41

## 2025-05-30 RX ADMIN — GADOPICLENOL 8 ML: 485.1 INJECTION INTRAVENOUS at 17:07

## 2025-05-30 RX ADMIN — HEPARIN SODIUM 5000 UNITS: 5000 INJECTION INTRAVENOUS; SUBCUTANEOUS at 20:55

## 2025-05-30 RX ADMIN — SODIUM CHLORIDE 100 ML/HR: 9 INJECTION, SOLUTION INTRAVENOUS at 17:25

## 2025-05-30 RX ADMIN — QUETIAPINE FUMARATE 50 MG: 25 TABLET ORAL at 20:55

## 2025-05-30 RX ADMIN — HEPARIN SODIUM 5000 UNITS: 5000 INJECTION INTRAVENOUS; SUBCUTANEOUS at 09:38

## 2025-05-30 RX ADMIN — ATORVASTATIN CALCIUM 10 MG: 10 TABLET, FILM COATED ORAL at 09:40

## 2025-05-30 RX ADMIN — SODIUM CHLORIDE 1000 MG: 9 INJECTION, SOLUTION INTRAVENOUS at 09:38

## 2025-05-30 RX ADMIN — LORAZEPAM 0.5 MG: 2 INJECTION INTRAMUSCULAR; INTRAVENOUS at 16:05

## 2025-05-30 RX ADMIN — MEMANTINE 10 MG: 5 TABLET ORAL at 20:55

## 2025-05-30 NOTE — PLAN OF CARE
Goal Outcome Evaluation:  Plan of Care Reviewed With: patient        Progress: improving  Outcome Evaluation: AVSS, no s/s of distress, NPO

## 2025-05-30 NOTE — THERAPY DISCHARGE NOTE
Per staff at facility patient is TD for tf to WC using alex lift, TD for ADLs. Patient can occasionally feed self. OT order to be DC'd.

## 2025-05-30 NOTE — PAYOR COMM NOTE
"To:  KYMK  From: Lashay Navarro RN  Phone: [phone]  Fax: [phone]  NPI: [id number]  TIN: [id number]  Member ID: [id number]   MRN: [id number]    Jason, Gali Vasquez (70 y.o. Female)       Date of Birth   1955    Social Security Number       Address   [address] [place] [address]    Home Phone   [phone]    MRN   [id number]       [facility]    Marital Status                               Admission Date   5/29/2025    Admission Type   Emergency    Admitting Provider   Beto Pierce MD    Attending Provider   Sen Kang DO    [facility]       Discharge Date       Discharge Disposition       Discharge Destination                                 Attending Provider: Sen Kang DO    Allergies: Bee Venom, Ace Inhibitors, Venlafaxine    Isolation: None   Infection: None   Code Status: No CPR    Ht: 172.7 cm (68\")   Wt: 79.9 kg (176 lb 2.4 oz)    Admission Cmt: None   Principal Problem: Seizure disorder [G40.909]                   Active Insurance as of 5/29/2025       Primary Coverage       Payor Plan Insurance Group Employer/Plan Group    MEDICARE MEDICARE A & B        Payor Plan Address Payor Plan Phone Number Payor Plan Fax Number Effective Dates    [address] [phone]  2/1/2019 - None Entered    [facility] [id number]         Subscriber Name Subscriber Birth Date Member ID       GALI JACKSON 1955 [id number]               Secondary Coverage       Payor Plan Insurance Group Employer/Plan Group     FOR LIFE  FOR LIFE  SUP         Payor Plan Address Payor Plan Phone Number Payor Plan Fax Number Effective Dates    [address] [phone]  1/1/2023 - None Entered    [facility] [address]         Subscriber Name Subscriber Birth Date Member ID       TIFFANIE JACKSON 6/20/1953 [id number]               Tertiary Coverage       Payor Plan Insurance Group Employer/Plan Group    [place] [place]        Payor Plan Address Payor Plan Phone Number Payor Plan Fax Number Effective Dates    [phone]  10/4/2022 - None Entered    [facility] [place] [address]         Subscriber Name Subscriber Birth Date Member ID       GALI JACKSON 1955 [id number]                     Emergency Contacts        (Rel.) Home Phone Work Phone Mobile Phone    renetta (poa)dallin (Son) [phone] -- [phone]    RENETTADEE DEE NIKOLAI (Son) [phone] -- --                 History & Physical        Beto Pierce MD at [facility]            [facility]   HISTORY AND PHYSICAL      Name:  Gali Jackson   Age:  70 y.o.  Sex:  female  :  1955  MRN:  [id number]   Visit Number:  [id number]  Admission Date:  2025  Date Of Service:  25  Primary Care Physician:  Mariano Gordon DO    Chief Complaint:     Seizure-like activity    History Of Presenting Illness:        Patient is 70 years old female with a past medical history of Alzheimer disease, depression, diabetes mellitus, hypertension, hyperlipidemia, hypothyroidism, presented to the ER from the nursing home with a chief complaint of seizure-like activity.  Patient was eating breakfast today prior to arrival and she suddenly started having convulsions that lasted around 3 minutes.  Nursing home staff. History otherwise limited with her history of Alzheimer dementia.  All history was obtained per ER and nursing home report.  Patient appears alert but keeping her eyes closed and nonverbal.    On ER evaluation, patient was tachycardic on arrival with a heart rate of 122 otherwise afebrile with stable blood pressure.  Workup in the ER was significant for HS Troponin of 28-36 with delta change of 8, creatinine 1.3, glucose 172, lactate 2.2, WBC 19.2.  Urinalysis consistent with infection.  Chest x-ray with no acute process.  Respiratory panel negative.  CT head without contrast showed Interval increase  in size of ventricles which appears disproportionate to the sulci, normal pressure hydrocephalus not excluded.  Teleneurology Dr. Macias consulted and felt that her CT findings more consistent with brain atrophy, recommended admission for MRI and EEG.  Patient received Ativan 1 mg, total of 3.3 L of normal saline boluses and was started on Rocephin while in the ER.  Hospitalist consulted for admission, further management and treatment.    Review Of Systems:    All systems were reviewed and negative except as mentioned in history of presenting illness, assessment and plan.    Past Medical History: Patient  has a past medical history of Alzheimer disease, Colon polyp, Depression, Diabetes mellitus (), Essential hypertension (2018), H/O bone density study (), Memory loss, Mixed hyperlipidemia (2017), Thyroid disease, and Thyroid enlargement.    Past Surgical History: Patient  has a past surgical history that includes  section (1978); Adenoidectomy; and Tonsillectomy.    Social History: Patient  reports that she has never smoked. She has never used smokeless tobacco. She reports that she does not drink alcohol and does not use drugs.    Family History:  Patient's family history has been reviewed and found to be noncontributory.     Allergies:      Bee venom, Ace inhibitors, and Venlafaxine    Home Medications:    Prior to Admission Medications       Prescriptions Last Dose Informant Patient Reported? Taking?    bisacodyl (Dulcolax) 10 MG suppository   Yes No    Insert 1 suppository into the rectum 2 (Two) Times a Day As Needed for Constipation.    Cholecalciferol (VITAMIN D3) 5000 UNITS capsule capsule   Yes No    Take 1 capsule by mouth Daily.    Cyanocobalamin (VITAMIN B-12) 500 MCG sublingual tablet   Yes No    Take 1 tablet by mouth Daily.    glipizide (GLUCOTROL) 5 MG tablet   Yes No    Take 1 tablet by mouth Daily.    glucose blood test strip   No No    1 each by Other route  "Daily. Use as instructed    levothyroxine (SYNTHROID, LEVOTHROID) 50 MCG tablet   No No    TAKE 1 TABLET BY MOUTH DAILY    losartan (COZAAR) 100 MG tablet   No No    TAKE 1 TABLET BY MOUTH DAILY    lovastatin (MEVACOR) 40 MG tablet   No No    Take 1 tablet by mouth Every Night.    memantine (NAMENDA) 10 MG tablet   No No    Take 1 tablet by mouth 2 (Two) Times a Day.    Multiple Vitamins-Minerals (PRESERVISION AREDS 2+MULTI VIT PO)   Yes No    QUEtiapine (SEROquel) 25 MG tablet   No No    Take 2 tablets by mouth Every Night.    rivastigmine (EXELON) 13.3 MG/24HR patch   No No    Place 1 patch on the skin as directed by provider Daily.    senna 8.6 MG tablet   Yes No    Take 1 tablet by mouth 2 (Two) Times a Day As Needed for Constipation.    sertraline (ZOLOFT) 50 MG tablet   No No    Take 1 tablet by mouth Daily.    SF 5000 PLUS 1.1 % cream   Yes No    See Admin Instructions.    Sodium Phosphates (PHOSPHATE ENEMA RE)   Yes No    Insert 1 Application into the rectum 2 (Two) Times a Day As Needed.          ED Medications:    Medications   sodium chloride 0.9 % flush 10 mL (has no administration in time range)   cefTRIAXone (ROCEPHIN) 2,000 mg in sodium chloride 0.9 % 100 mL IVPB-VTB (2,000 mg Intravenous New Bag 5/29/25 1031)   LORazepam (ATIVAN) injection 1 mg (1 mg Intravenous Given 5/29/25 0902)   sodium chloride 0.9 % bolus 1,000 mL (1,000 mL Intravenous New Bag 5/29/25 0902)   sodium chloride 0.9 % bolus 2,361 mL (2,361 mL Intravenous New Bag 5/29/25 0959)     Vital Signs:  Temp:  [97.1 °F (36.2 °C)] 97.1 °F (36.2 °C)  Heart Rate:  [] 93  Resp:  [20] 20  BP: ()/(62-87) 134/80        05/29/25  0832   Weight: 78.7 kg (173 lb 8 oz)     Body mass index is 28.02 kg/m².    Physical Exam:     Most recent vital Signs: /80   Pulse 93   Temp 97.1 °F (36.2 °C) (Axillary)   Resp 20   Ht 167.6 cm (65.98\")   Wt 78.7 kg (173 lb 8 oz)   SpO2 99%   BMI 28.02 kg/m²     Physical Exam  Vitals and nursing " note reviewed.   Constitutional:       General: She is not in acute distress.     Appearance: She is ill-appearing.   HENT:      Head: Normocephalic and atraumatic.      Right Ear: External ear normal.      Left Ear: External ear normal.      Nose: Nose normal.      Mouth/Throat:      Mouth: Mucous membranes are moist.   Eyes:      Extraocular Movements: Extraocular movements intact.      Conjunctiva/sclera: Conjunctivae normal.      Pupils: Pupils are equal, round, and reactive to light.   Cardiovascular:      Rate and Rhythm: Normal rate and regular rhythm.      Pulses: Normal pulses.      Heart sounds: Normal heart sounds.   Pulmonary:      Effort: Pulmonary effort is normal. No respiratory distress.      Breath sounds: Normal breath sounds. No wheezing or rhonchi.   Abdominal:      General: Bowel sounds are normal. There is no distension.      Palpations: Abdomen is soft.      Tenderness: There is no abdominal tenderness.   Musculoskeletal:         General: Normal range of motion.      Cervical back: Normal range of motion and neck supple.      Right lower leg: No edema.      Left lower leg: No edema.   Skin:     General: Skin is warm and dry.      Findings: No rash.   Neurological:      General: No focal deficit present.      Mental Status: She is alert. She is disoriented and confused.      Motor: No weakness.   Psychiatric:      Comments: Demented         Laboratory data:    I have reviewed the labs done in the emergency room.    Results from last 7 days   Lab Units 05/29/25  0835   SODIUM mmol/L 138   POTASSIUM mmol/L 4.6   CHLORIDE mmol/L 101   CO2 mmol/L 23.1   BUN mg/dL 17.0   CREATININE mg/dL 1.33*   CALCIUM mg/dL 10.0   BILIRUBIN mg/dL 0.4   ALK PHOS U/L 107   ALT (SGPT) U/L 7   AST (SGOT) U/L 18   GLUCOSE mg/dL 174*     Results from last 7 days   Lab Units 05/29/25  0835   WBC 10*3/mm3 19.22*   HEMOGLOBIN g/dL 14.7   HEMATOCRIT % 47.1*   PLATELETS 10*3/mm3 340         Results from last 7 days   Lab  Units 05/29/25  1008 05/29/25  0835   HSTROP T ng/L 36* 28*     Results from last 7 days   Lab Units 05/29/25  0835   PROBNP pg/mL 66.5                 Results from last 7 days   Lab Units 05/29/25  0922   COLOR UA  Yellow   GLUCOSE UA  Negative   KETONES UA  Trace*   BLOOD UA  Negative   LEUKOCYTES UA  Small (1+)*   PH, URINE  6.5   BILIRUBIN UA  Negative   UROBILINOGEN UA  0.2 E.U./dL   RBC UA /HPF 0-2   WBC UA /HPF 3-5*       Pain Management Panel  More data exists         5/21/2019 2/5/2018   Pain Management Panel   Creatinine, Urine 100  206.0        EKG:      Sinus tachycardia, heart rate 107, nonspecific ST/T wave changes.    Radiology:    CT Head Without Contrast  Result Date: 5/29/2025  PROCEDURE: CT HEAD WO CONTRAST-  HISTORY: seizure  COMPARISON: May 1, 2020..  TECHNIQUE: Multiple axial CT images were performed from the foramen magnum to the vertex. Individualized dose reduction techniques using automated exposure control or adjustment of mA and/or kV according to the patient size were employed.  FINDINGS: There is moderate generalized cerebral atrophy. The ventricles are moderately enlarged, mildly increased from prior exam and appear disproportionate to the prominent sulci. Normal pressure hydrocephalus is not excluded. Mild small vessel ischemic disease noted. There is no evidence of edema or hemorrhage.  No masses are identified. No extra-axial fluid is seen. The paranasal sinuses are unremarkable.      Interval increase in size of ventricles which appears disproportionate to the sulci, normal pressure hydrocephalus not excluded.     CTDI: 39.49 mGy DLP:726.89 mGy.cm  This report was signed and finalized on 5/29/2025 10:06 AM by Trinity Sanchez MD.      XR Chest 1 View  Result Date: 5/29/2025  PROCEDURE: XR CHEST 1 VW-  HISTORY: ams  COMPARISON: None.  FINDINGS: The heart is normal in size. The lungs are clear. The mediastinum is unremarkable. There is no pneumothorax.  There are no acute osseous  abnormalities. Apical lordotic positioning noted.      No acute cardiopulmonary process.     This report was signed and finalized on 5/29/2025 9:42 AM by Trinity Sanchez MD.        Assessment:    Seizure-like activity, POA  Acute UTI, POA  Severe sepsis, secondary to #2, POA  Elevated troponin, likely demand ischemia, POA  Alzheimer dementia  Depression  Diabetes mellitus  Hypertension  Hyperlipidemia  Hypothyroidism    Plan:    Patient is admitted for further management and treatment.    Seizure-like activity  - New onset seizure-like activity likely provoked and secondary to UTI and sepsis.  - Neurology consulted and following, appreciate recommendations.  - EEG ordered  - MRI brain with and without contrast ordered  - Hold on antiseizure medications unless EEG showing abnormal discharges  - Use Ativan 2 mg IV for tonic-clonic seizures lasting more than 3 minutes  - Seizure precautions   - Treatment of UTI and sepsis as per below    Acute UTI  Severe sepsis  -Met SIRS criteria with tachycardia and leukocytosis  -Received IV fluid boluses in the ER  -Continue with maintenance IV fluids  - Continue with Rocephin  - Follow-up on urine cultures and blood cultures    Alzheimer dementia  Hypertension  Hyperlipidemia  -Continue home meds as warranted.  -Holding on antihypertensives due to soft blood pressure    -Further orders as indicated per clinical course.    Risk Assessment: Moderate to high  DVT Prophylaxis: Heparin prophylaxis (benefit> risk)  Code Status: DNR/DNI  Diet: Advance as tolerated after dysphagia screen    Advance Care Planning  ACP discussion was held with the patient during this visit. Patient does not have an advance directive, information provided.       Beto Pierce MD  05/29/25  11:13 EDT    Dictated utilizing Dragon dictation.    Electronically signed by Beto Pierce MD at 05/29/25 1437          Emergency Department Notes        Elana Alonzo RN at 05/29/25 1412          Report to Veronica  "nurse for admit to room 318    Electronically signed by Elana Alonzo RN at 25 1412       Ramonita Wolff APRN at 25 0828           EMERGENCY DEPARTMENT ENCOUNTER    Pt Name: Cassie Gomez  MRN: [id number]  Pt :   1955  Room Number:    Date of encounter:  2025  PCP: Mariano Gordon DO  ED Provider: VALENTINA Adkins    Historian: EMS      HPI:  Chief Complaint: Seizure-like activity        Context: Cassie Gomez is a 70 y.o. female who presents to the ED c/o seizure-like activity.  Patient arrives from [facility] via [facility] with a witnessed seizure-like activity.  Patient was at breakfast, per report she yelled, tensed and started shaking.  Episode lasted for 3 or 4 minutes.  EMT reports patient was \"tense\" during transport, no medications given prior.  No history of epilepsy.       PAST MEDICAL HISTORY  Past Medical History:   Diagnosis Date    Alzheimer disease     Colon polyp     Depression     Diabetes mellitus 2000    Essential hypertension 2018    H/O bone density study     normal    Memory loss     Mixed hyperlipidemia 2017    Seizure 2025    Thyroid disease     Thyroid enlargement          PAST SURGICAL HISTORY  Past Surgical History:   Procedure Laterality Date    ADENOIDECTOMY       SECTION  1978    TONSILLECTOMY           FAMILY HISTORY  Family History   Problem Relation Age of Onset    Diabetes Mother     Heart disease Mother     Heart attack Mother     COPD Mother     Dementia Father     Diabetes Father     Stroke Father         In his Left eye    Squamous cell carcinoma Brother         oral         SOCIAL HISTORY  Social History     Socioeconomic History    Marital status:    Tobacco Use    Smoking status: Never    Smokeless tobacco: Never   Vaping Use    Vaping status: Never Used   Substance and Sexual Activity    Alcohol use: No    Drug use: No    Sexual activity: Not Currently     Partners: " Male     Birth control/protection: Post-menopausal         ALLERGIES  Bee venom, Ace inhibitors, and Venlafaxine        REVIEW OF SYSTEMS  Review of Systems       All systems reviewed and negative except for those discussed in HPI.       PHYSICAL EXAM    I have reviewed the triage vital signs and nursing notes.    ED Triage Vitals   Temp Pulse Resp BP SpO2   -- -- -- -- --      Temp src Heart Rate Source Patient Position BP Location FiO2 (%)   -- -- -- -- --       Physical Exam  GENERAL:   Appears in no acute distress.  Chronically ill-appearing  HENT: Nares patent.  Dry mucous membranes, poor dentition, small amount of blood in the tip of the tongue  EYES: No scleral icterus.  Pupils reactive  CV: Regular rhythm, tachycardia  RESPIRATORY: Normal effort.  No audible wheezes, rales or rhonchi.  ABDOMEN: Soft, nontender  MUSCULOSKELETAL: No deformities.   NEURO: Responds to painful stimuli, moves all extremities, does not follow commands.  No facial droop  SKIN: Warm, dry, no rash visualized.  Pale      LAB RESULTS  Recent Results (from the past 24 hours)   Green Top (Gel)    Collection Time: 05/29/25  8:35 AM   Result Value Ref Range    Extra Tube Hold for add-ons.    Lavender Top    Collection Time: 05/29/25  8:35 AM   Result Value Ref Range    Extra Tube hold for add-on    Gold Top - SST    Collection Time: 05/29/25  8:35 AM   Result Value Ref Range    Extra Tube Hold for add-ons.    Light Blue Top    Collection Time: 05/29/25  8:35 AM   Result Value Ref Range    Extra Tube Hold for add-ons.    Comprehensive Metabolic Panel    Collection Time: 05/29/25  8:35 AM    Specimen: Blood   Result Value Ref Range    Glucose 174 (H) 65 - 99 mg/dL    BUN 17.0 8.0 - 23.0 mg/dL    Creatinine 1.33 (H) 0.57 - 1.00 mg/dL    Sodium 138 136 - 145 mmol/L    Potassium 4.6 3.5 - 5.2 mmol/L    Chloride 101 98 - 107 mmol/L    CO2 23.1 22.0 - 29.0 mmol/L    Calcium 10.0 8.6 - 10.5 mg/dL    Total Protein 7.6 6.0 - 8.5 g/dL    Albumin 4.0  3.5 - 5.2 g/dL    ALT (SGPT) 7 1 - 33 U/L    AST (SGOT) 18 1 - 32 U/L    Alkaline Phosphatase 107 39 - 117 U/L    Total Bilirubin 0.4 0.0 - 1.2 mg/dL    Globulin 3.6 gm/dL    A/G Ratio 1.1 g/dL    BUN/Creatinine Ratio 12.8 7.0 - 25.0    Anion Gap 13.9 5.0 - 15.0 mmol/L    eGFR 42.9 (L) >60.0 mL/min/1.73   High Sensitivity Troponin T    Collection Time: 05/29/25  8:35 AM    Specimen: Blood   Result Value Ref Range    HS Troponin T 28 (H) <14 ng/L   BNP    Collection Time: 05/29/25  8:35 AM    Specimen: Blood   Result Value Ref Range    proBNP 66.5 0.0 - 900.0 pg/mL   CBC Auto Differential    Collection Time: 05/29/25  8:35 AM    Specimen: Blood   Result Value Ref Range    WBC 19.22 (H) 3.40 - 10.80 10*3/mm3    RBC 5.23 3.77 - 5.28 10*6/mm3    Hemoglobin 14.7 12.0 - 15.9 g/dL    Hematocrit 47.1 (H) 34.0 - 46.6 %    MCV 90.1 79.0 - 97.0 fL    MCH 28.1 26.6 - 33.0 pg    MCHC 31.2 (L) 31.5 - 35.7 g/dL    RDW 14.1 12.3 - 15.4 %    RDW-SD 46.5 37.0 - 54.0 fl    MPV 10.4 6.0 - 12.0 fL    Platelets 340 140 - 450 10*3/mm3    Neutrophil % 63.9 42.7 - 76.0 %    Lymphocyte % 28.0 19.6 - 45.3 %    Monocyte % 5.2 5.0 - 12.0 %    Eosinophil % 1.0 0.3 - 6.2 %    Basophil % 0.7 0.0 - 1.5 %    Immature Grans % 1.2 (H) 0.0 - 0.5 %    Neutrophils, Absolute 12.28 (H) 1.70 - 7.00 10*3/mm3    Lymphocytes, Absolute 5.39 (H) 0.70 - 3.10 10*3/mm3    Monocytes, Absolute 1.00 (H) 0.10 - 0.90 10*3/mm3    Eosinophils, Absolute 0.19 0.00 - 0.40 10*3/mm3    Basophils, Absolute 0.13 0.00 - 0.20 10*3/mm3    Immature Grans, Absolute 0.23 (H) 0.00 - 0.05 10*3/mm3    nRBC 0.0 0.0 - 0.2 /100 WBC   TSH    Collection Time: 05/29/25  8:35 AM    Specimen: Blood   Result Value Ref Range    TSH 3.720 0.270 - 4.200 uIU/mL   Magnesium    Collection Time: 05/29/25  8:35 AM    Specimen: Blood   Result Value Ref Range    Magnesium 2.2 1.6 - 2.4 mg/dL   Urinalysis With Culture If Indicated - Urine, Catheter    Collection Time: 05/29/25  9:22 AM    Specimen: Urine,  Catheter   Result Value Ref Range    Color, UA Yellow Yellow, Straw    Appearance, UA Turbid (A) Clear    pH, UA 6.5 5.0 - 8.0    Specific Gravity, UA 1.017 1.005 - 1.030    Glucose, UA Negative Negative    Ketones, UA Trace (A) Negative    Bilirubin, UA Negative Negative    Blood, UA Negative Negative    Protein, UA Trace (A) Negative    Leuk Esterase, UA Small (1+) (A) Negative    Nitrite, UA Positive (A) Negative    Urobilinogen, UA 0.2 E.U./dL 0.2 - 1.0 E.U./dL   Urinalysis, Microscopic Only - Urine, Catheter    Collection Time: 05/29/25  9:22 AM    Specimen: Urine, Catheter   Result Value Ref Range    RBC, UA 0-2 None Seen, 0-2 /HPF    WBC, UA 3-5 (A) None Seen, 0-2 /HPF    Bacteria, UA 4+ (A) None Seen /HPF    Squamous Epithelial Cells, UA 7-12 (A) None Seen, 0-2 /HPF    Hyaline Casts, UA None Seen None Seen /LPF    Methodology Manual Light Microscopy    Lactic Acid, Plasma    Collection Time: 05/29/25  9:23 AM    Specimen: Blood   Result Value Ref Range    Lactate 2.2 (C) 0.5 - 2.0 mmol/L   Respiratory Panel PCR w/COVID-19(SARS-CoV-2) JAYY/DUSTY/MIKE/PAD/COR/STEFANO In-House, NP Swab in [email or web address], 2 HR TAT - Swab, Nasopharynx    Collection Time: 05/29/25  9:55 AM    Specimen: Nasopharynx; Swab   Result Value Ref Range    ADENOVIRUS, PCR Not Detected Not Detected    Coronavirus 229E Not Detected Not Detected    Coronavirus HKU1 Not Detected Not Detected    Coronavirus NL63 Not Detected Not Detected    Coronavirus OC43 Not Detected Not Detected    COVID19 Not Detected Not Detected - Ref. Range    Human Metapneumovirus Not Detected Not Detected    Human Rhinovirus/Enterovirus Not Detected Not Detected    Influenza A PCR Not Detected Not Detected    Influenza B PCR Not Detected Not Detected    Parainfluenza Virus 1 Not Detected Not Detected    Parainfluenza Virus 2 Not Detected Not Detected    Parainfluenza Virus 3 Not Detected Not Detected    Parainfluenza Virus 4 Not Detected Not Detected    RSV, PCR Not Detected Not  Detected    Bordetella pertussis pcr Not Detected Not Detected    Bordetella parapertussis PCR Not Detected Not Detected    Chlamydophila pneumoniae PCR Not Detected Not Detected    Mycoplasma pneumo by PCR Not Detected Not Detected   High Sensitivity Troponin T 1Hr    Collection Time: 05/29/25 10:08 AM    Specimen: Blood   Result Value Ref Range    HS Troponin T 36 (H) <14 ng/L    Troponin T Numeric Delta 8 (C) Abnormal if >/=3 ng/L    Troponin T % Delta 29 (C) Abnormal if >/= 20%   Procalcitonin    Collection Time: 05/29/25 10:08 AM    Specimen: Blood   Result Value Ref Range    Procalcitonin 0.03 0.00 - 0.25 ng/mL       If labs were ordered, I independently reviewed the results and considered them in treating the patient.        RADIOLOGY  CT Head Without Contrast  Result Date: 5/29/2025  PROCEDURE: CT HEAD WO CONTRAST-  HISTORY: seizure  COMPARISON: May 1, 2020..  TECHNIQUE: Multiple axial CT images were performed from the foramen magnum to the vertex. Individualized dose reduction techniques using automated exposure control or adjustment of mA and/or kV according to the patient size were employed.  FINDINGS: There is moderate generalized cerebral atrophy. The ventricles are moderately enlarged, mildly increased from prior exam and appear disproportionate to the prominent sulci. Normal pressure hydrocephalus is not excluded. Mild small vessel ischemic disease noted. There is no evidence of edema or hemorrhage.  No masses are identified. No extra-axial fluid is seen. The paranasal sinuses are unremarkable.      Interval increase in size of ventricles which appears disproportionate to the sulci, normal pressure hydrocephalus not excluded.     CTDI: 39.49 mGy DLP:726.89 mGy.cm  This report was signed and finalized on 5/29/2025 10:06 AM by Trinity Sanchez MD.      XR Chest 1 View  Result Date: 5/29/2025  PROCEDURE: XR CHEST 1 VW-  HISTORY: ams  COMPARISON: None.  FINDINGS: The heart is normal in size. The lungs are  clear. The mediastinum is unremarkable. There is no pneumothorax.  There are no acute osseous abnormalities. Apical lordotic positioning noted.      No acute cardiopulmonary process.     This report was signed and finalized on 5/29/2025 9:42 AM by Trinity Sanchez MD.        I ordered and independently reviewed the above noted radiographic studies.      I viewed images of chest x-ray which showed no lobar pneumonia per my independent interpretation.    See radiologist's dictation for official interpretation.        PROCEDURES    Procedures    ECG 12 Lead Altered Mental Status   Final Result          MEDICATIONS GIVEN IN ER    Medications   sodium chloride 0.9 % flush 10 mL (has no administration in time range)   cefTRIAXone (ROCEPHIN) 2,000 mg in sodium chloride 0.9 % 100 mL IVPB-VTB (2,000 mg Intravenous New Bag 5/29/25 1031)   LORazepam (ATIVAN) injection 1 mg (1 mg Intravenous Given 5/29/25 0902)   sodium chloride 0.9 % bolus 1,000 mL (1,000 mL Intravenous New Bag 5/29/25 0902)   sodium chloride 0.9 % bolus 2,361 mL (2,361 mL Intravenous New Bag 5/29/25 0959)         MEDICAL DECISION MAKING, PROGRESS, and CONSULTS    All labs, if obtained, have been independently reviewed by me.  All radiology studies, if obtained, have been reviewed by me and the radiologist dictating the report.  All EKG's, if obtained, have been independently viewed and interpreted by me/my attending physician.      Discussion below represents my analysis of pertinent findings related to patient's condition, differential diagnosis, treatment plan and final disposition.  Patient is 70-year-old female with history of Alzheimer's disease, hypertension, diabetes thyroid disease, depression who presented from [facility] and rehab via [facility] for seizure-like activity.  On physical exam she was chronically ill-appearing, alert and oriented x 0, reactive pupils, no facial droop, no active seizure.  Lab work significant for elevated lactate  2.2, leukocytosis 19.2, creatinine 1.33, normal procalcitonin 0.031, mildly elevated troponin 28 and trending upwards 36, sinus tachycardia on EKG, no STEMI.  Patient received fluids and antibiotics per sepsis protocol, suspected urinary source of infection.  CT of the brain shows interval increase in size of ventricles.  Neurology consulted changes on CT of the brain due to progression of brain atrophy and ex vacuo dilation, no active seizure activity.  Hospital services consulted for admission.                       Differential diagnosis:    Seizure, convulsive syncope, altered mental status, sepsis, SIRS, UTI, urosepsis, ICH, CVA      Additional sources:    - Discussed/ obtained information from independent historians: EMS    - External (non-ED) record review: 3/5/2025, progress note with family medicine    - Chronic or social conditions impacting care: Dementia, nursing home resident    - Shared decision making:        Orders placed during this visit:  Orders Placed This Encounter   Procedures    Blood Culture - Blood,    Blood Culture - Blood,    Respiratory Panel PCR w/COVID-19(SARS-CoV-2) JAYY/DUSTY/MIKE/PAD/COR/STEFANO In-House, NP Swab in UTM/VTM, 2 HR TAT - Swab, Nasopharynx    XR Chest 1 View    CT Head Without Contrast    MRI Brain With & Without Contrast    Echo Draw    Comprehensive Metabolic Panel    Lactic Acid, Plasma    Urinalysis With Culture If Indicated - Urine, Catheter    High Sensitivity Troponin T    BNP    CBC Auto Differential    TSH    Magnesium    High Sensitivity Troponin T 1Hr    Urinalysis, Microscopic Only - Urine, Clean Catch    Procalcitonin    STAT Lactic Acid, Reflex    Phosphorus    Calcium, Ionized    NPO Diet NPO Type: Strict NPO    Continuous Pulse Oximetry    Vital Signs    Inpatient Neurology Consult General    Oxygen Therapy- Nasal Cannula; Titrate 1-6 LPM Per SpO2; 90 - 95%    POC Glucose Once    ECG 12 Lead Altered Mental Status    EEG    Insert Peripheral IV    Inpatient  Admission    Seizure Precautions    Seizure Precautions    Green Top (Gel)    Lavender Top    Gold Top - SST    Light Blue Top    CBC & Differential         Additional orders considered but not ordered:      ED Course:    Consultants:      ED Course as of 05/29/25 1208   Thu May 29, 2025   0840 I have interviewed and examined the patient FACE-TO-FACE.  I reviewed the mid-level provider(s) note and agree with the clinical impression, plan, and disposition unless otherwise stated in the MDM below.    ATTENDING ATTESTATION  HPI: 70 y.o. female who presents following witnessed seizure-like activity this morning while eating breakfast at nursing home. Seizure activity has since stopped. Vital signs stable en route.     Pmhx of dementia and nonverbal at baseline. No history of seizures. No reported trauma.    Patient is DNR.    EXAM:   General: Appears asleep, chronically ill, and nontoxic. No acute distress.  Head: Normocephalic.  Atraumatic.  Eyes: Pupils 2 mm.  PERRLA.   ENT: Airway is patent. Small abrasion left side of tongue.  Cardiovascular: Regular rate and rhythm.  No murmurs.  No rubs.  2+ distal pulses bilaterally.  Respiratory: No wheezing. No rales.  No rhonchi.  GI: Abdomen is soft.  Nondistended.  Nontender to palpation.  No rebound.  No guarding.  No CVA tenderness.  Neurologic: GCS 7 (1/1/5).  Skin: No edema. No erythema. No pallor. No cyanosis. No signs of external trauma.             [JS]   0848 ECG 12 Lead Altered Mental Status  EKG per my interpretation sinus tachycardia, rate 107, normal axis, no STEMI, normal QRS QTC intervals.   [JS]   0850 WBC(!): 19.22 [IR]   0939 Creatinine(!): 1.33  Mild renal insufficiency [IR]   1019 Lactate(!!): 2.2 [IR]   1031 Spoke with teleneurology, Dr. Macias,  advised to order ionized calcium, phosphorus, stat EEG, brain MRI, will see patient in ER [IR]   1040 Dr. Macias assessed patient at bedside, low concern for ongoing seizure, advised MRI and EEG, baseline  oriented x 0 from previous neurology notes, no hydrocephalus, more so progression of brain atrophy and ex-vacuo dilation [IR]      ED Course User Index  [IR] Ramonita Wolff APRN  [JS] Gopal Carmichael DO              Shared Decision Making:  After my consideration of clinical presentation and any laboratory/radiology studies obtained, I discussed the findings with the patient/patient representative who is in agreement with the treatment plan and the final disposition.   Risks and benefits of discharge and/or observation/admission were discussed.       AS OF 12:08 EDT VITALS:    BP - 134/80  HR - 93  TEMP - 97.1 °F (36.2 °C) (Axillary)  O2 SATS - 99%                  DIAGNOSIS  Final diagnoses:   Altered mental status, unspecified altered mental status type   Seizure-like activity   UTI (urinary tract infection) with pyuria   Leukocytosis, unspecified type   Elevated lactic acid level   Elevated troponin         DISPOSITION  admit      Please note that portions of this document were completed with voice recognition software.     VALENTINA Adkins   05/29/25   12:08 EDT        Ramonita Wolff APRN  05/29/25 1208      Electronically signed by Ramonita Wolff APRN at 05/29/25 1208       Vital Signs (last day)       Date/Time Temp Temp src Pulse Resp BP Patient Position SpO2    05/30/25 0721 98.9 (37.2) Axillary -- 16 135/58 Lying --    05/30/25 0300 99.6 (37.6) Axillary -- 16 120/57 Lying --    05/30/25 0006 99.3 (37.4) Axillary -- 18 111/69 Lying --    05/29/25 1937 99.4 (37.4) Axillary -- 16 117/51 Lying --    05/29/25 1438 98.2 (36.8) Oral 87 16 106/62 Lying 98    05/29/25 1357 -- -- 85 -- 130/80 -- 98    05/29/25 1331 -- -- 87 -- 127/77 -- 98    05/29/25 1031 -- -- 93 -- 134/80 -- 99    05/29/25 1026 -- -- 84 -- 83/71 -- --    05/29/25 1000 -- -- 85 -- 119/73 -- 96    05/29/25 0930 -- -- 86 -- 116/64 -- 98    05/29/25 0900 -- -- 81 -- 109/62 -- 94    05/29/25 0835 97.1 (36.2) Axillary -- -- -- -- --    05/29/25  0832 -- -- 122 20 142/87 -- 95          Current Facility-Administered Medications   Medication Dose Route Frequency Provider Last Rate Last Admin    acetaminophen (TYLENOL) tablet 650 mg  650 mg Oral Q4H PRN Beto Pierce MD        Or    acetaminophen (TYLENOL) 160 MG/5ML oral solution 650 mg  650 mg Oral Q4H PRN Beto Pierce MD        Or    acetaminophen (TYLENOL) suppository 650 mg  650 mg Rectal Q4H PRN Beto Pierce MD        atorvastatin (LIPITOR) tablet 10 mg  10 mg Oral Daily Beto Pierce MD   10 mg at 05/30/25 0940    sennosides-docusate (PERICOLACE) 8.6-50 MG per tablet 2 tablet  2 tablet Oral BID PRN Beto Pierce MD        And    polyethylene glycol (MIRALAX) packet 17 g  17 g Oral Daily PRN Beto Pierce MD        And    bisacodyl (DULCOLAX) EC tablet 5 mg  5 mg Oral Daily PRN Beto Pierce MD        And    bisacodyl (DULCOLAX) suppository 10 mg  10 mg Rectal Daily PRN Beto Pierce MD        cefTRIAXone (ROCEPHIN) 1,000 mg in sodium chloride 0.9 % 100 mL IVPB-VTB  1,000 mg Intravenous Q24H Beto Pierce  mL/hr at 05/30/25 0938 1,000 mg at 05/30/25 0938    heparin (porcine) 5000 UNIT/ML injection 5,000 Units  5,000 Units Subcutaneous Q12H Beto Pierce MD   5,000 Units at 05/30/25 0938    levothyroxine (SYNTHROID, LEVOTHROID) tablet 50 mcg  50 mcg Oral Daily Beto Pierce MD        LORazepam (ATIVAN) injection 2 mg  2 mg Intravenous Q4H PRN Beto Pierce MD        Magnesium Standard Dose Replacement - Follow Nurse / BPA Driven Protocol   Not Applicable PRN Beto Pierce MD        melatonin tablet 5 mg  5 mg Oral Nightly PRN Beto Pierce MD        memantine (NAMENDA) tablet 10 mg  10 mg Oral BID Beto Pierce MD        nitroglycerin (NITROSTAT) SL tablet 0.4 mg  0.4 mg Sublingual Q5 Min PRN Beto Pierce MD        ondansetron (ZOFRAN) injection 4 mg  4 mg Intravenous Q6H PRN Beto Pierce MD        Pharmacy To Dose: Ceftriaxone   Not Applicable  Continuous PRN Beto Pierce MD        Potassium Replacement - Follow Nurse / BPA Driven Protocol   Not Applicable PRN Beto Pierce MD        QUEtiapine (SEROquel) tablet 50 mg  50 mg Oral Nightly Beto Pierce MD        rivastigmine (EXELON) 13.3 MG/24HR patch 1 patch  1 patch Transdermal Daily Beto Pierce MD        sertraline (ZOLOFT) tablet 50 mg  50 mg Oral Daily Beto Pierce MD        sodium chloride 0.9 % flush 10 mL  10 mL Intravenous PRN Beto Pierce MD        sodium chloride 0.9 % flush 10 mL  10 mL Intravenous Q12H Beto Pierce MD   10 mL at 05/30/25 0941    sodium chloride 0.9 % flush 10 mL  10 mL Intravenous PRN Beto Pierce MD        sodium chloride 0.9 % infusion 40 mL  40 mL Intravenous PRN Beto Pierce MD        sodium chloride 0.9 % infusion  100 mL/hr Intravenous Continuous Beto Pierce  mL/hr at 05/29/25 1837 100 mL/hr at 05/29/25 1837     Lab Results (last 24 hours)       Procedure Component Value Units Date/Time    High Sensitivity Troponin T [[id number]]  (Abnormal) Collected: 05/30/25 0620    Specimen: Blood Updated: 05/30/25 0658     HS Troponin T 29 ng/L     Narrative:      High Sensitive Troponin T Reference Range:  <14.0 ng/L- Negative Female for AMI  <22.0 ng/L- Negative Male for AMI  >=14 - Abnormal Female indicating possible myocardial injury.  >=22 - Abnormal Male indicating possible myocardial injury.   Clinicians would have to utilize clinical acumen, EKG, Troponin, and serial changes to determine if it is an Acute Myocardial Infarction or myocardial injury due to an underlying chronic condition.         CBC (No Diff) [[id number]]  (Normal) Collected: 05/30/25 0620    Specimen: Blood Updated: 05/30/25 0657     WBC 9.39 10*3/mm3      RBC 5.13 10*6/mm3      Hemoglobin 14.2 g/dL      Hematocrit 44.8 %      MCV 87.3 fL      MCH 27.7 pg      MCHC 31.7 g/dL      RDW 13.7 %      RDW-SD 43.8 fl      MPV 10.9 fL      Platelets 205 10*3/mm3     Basic  Metabolic Panel [[id number]]  (Abnormal) Collected: 05/30/25 0620    Specimen: Blood Updated: 05/30/25 0656     Glucose 123 mg/dL      BUN 11.0 mg/dL      Creatinine 1.22 mg/dL      Sodium 140 mmol/L      Potassium 4.1 mmol/L      Comment: Slight hemolysis detected by analyzer. Result may be falsely elevated.        Chloride 108 mmol/L      CO2 20.6 mmol/L      Calcium 9.2 mg/dL      BUN/Creatinine Ratio 9.0     Anion Gap 11.4 mmol/L      eGFR 47.5 mL/min/1.73     Narrative:      GFR Categories in Chronic Kidney Disease (CKD)              GFR Category          GFR (mL/min/1.73)    Interpretation  G1                    90 or greater        Normal or high (1)  G2                    60-89                Mild decrease (1)  G3a                   45-59                Mild to moderate decrease  G3b                   30-44                Moderate to severe decrease  G4                    15-29                Severe decrease  G5                    14 or less           Kidney failure    (1)In the absence of evidence of kidney disease, neither GFR category G1 or G2 fulfill the criteria for CKD.    eGFR calculation 2021 CKD-EPI creatinine equation, which does not include race as a factor    Calcium, Ionized [[id number]]  (Normal) Collected: 05/29/25 1057    Specimen: Blood Updated: 05/29/25 1816     Ionized Calcium 1.24 mmol/L     Narrative:      This test was developed, its performance characteristics determined and judged suitable for clinical purposes by [facility].  The specimen type utilized for this test has not been cleared or approved by the FDA.  The laboratory is regulated under CLIA as qualified to perform high-complexity testing.    High Sensitivity Troponin T [[id number]]  (Abnormal) Collected: 05/29/25 1455    Specimen: Blood Updated: 05/29/25 1544     HS Troponin T 36 ng/L     Narrative:      High Sensitive Troponin T Reference Range:  <14.0 ng/L- Negative Female for AMI  <22.0 ng/L-  Negative Male for AMI  >=14 - Abnormal Female indicating possible myocardial injury.  >=22 - Abnormal Male indicating possible myocardial injury.   Clinicians would have to utilize clinical acumen, EKG, Troponin, and serial changes to determine if it is an Acute Myocardial Infarction or myocardial injury due to an underlying chronic condition.         STAT Lactic Acid, Reflex [[id number]]  (Normal) Collected: 05/29/25 1228    Specimen: Blood Updated: 05/29/25 1255     Lactate 1.3 mmol/L     Phosphorus [[id number]]  (Abnormal) Collected: 05/29/25 1008    Specimen: Blood Updated: 05/29/25 1228     Phosphorus 2.3 mg/dL     Respiratory Panel PCR w/COVID-19(SARS-CoV-2) JAYY/DUSTY/MIKE/PAD/COR/STEFANO In-House, NP Swab in UTM/VTM, 2 HR TAT - Swab, Nasopharynx [[id number]]  (Normal) Collected: 05/29/25 0955    Specimen: Swab from Nasopharynx Updated: 05/29/25 1132     ADENOVIRUS, PCR Not Detected     Coronavirus 229E Not Detected     Coronavirus HKU1 Not Detected     Coronavirus NL63 Not Detected     Coronavirus OC43 Not Detected     COVID19 Not Detected     Human Metapneumovirus Not Detected     Human Rhinovirus/Enterovirus Not Detected     Influenza A PCR Not Detected     Influenza B PCR Not Detected     Parainfluenza Virus 1 Not Detected     Parainfluenza Virus 2 Not Detected     Parainfluenza Virus 3 Not Detected     Parainfluenza Virus 4 Not Detected     RSV, PCR Not Detected     Bordetella pertussis pcr Not Detected     Bordetella parapertussis PCR Not Detected     Chlamydophila pneumoniae PCR Not Detected     Mycoplasma pneumo by PCR Not Detected    Narrative:      In the setting of a positive respiratory panel with a viral infection PLUS a negative procalcitonin without other underlying concern for bacterial infection, consider observing off antibiotics or discontinuation of antibiotics and continue supportive care. If the respiratory panel is positive for atypical bacterial infection (Bordetella pertussis, Chlamydophila  pneumoniae, or Mycoplasma pneumoniae), consider antibiotic de-escalation to target atypical bacterial infection.          Imaging Results (Last 24 Hours)       Procedure Component Value Units Date/Time    MRI Brain With & Without Contrast [[id number]] Resulted: 05/29/25 1132     Updated: 05/29/25 1135          Orders (last 24 hrs)        Start     Ordered    05/30/25 1030  cefTRIAXone (ROCEPHIN) 1,000 mg in sodium chloride 0.9 % 100 mL IVPB-VTB  Every 24 Hours         05/29/25 1643    05/30/25 1002  EEG  Once         05/30/25 1001    05/30/25 0600  Basic Metabolic Panel  Morning Draw         05/29/25 1638    05/30/25 0600  CBC (No Diff)  Morning Draw         05/29/25 1638    05/30/25 0600  High Sensitivity Troponin T  Morning Draw         05/29/25 1432    05/29/25 2100  memantine (NAMENDA) tablet 10 mg  2 Times Daily         05/29/25 1638    05/29/25 2100  QUEtiapine (SEROquel) tablet 50 mg  Nightly         05/29/25 1638    05/29/25 2100  sodium chloride 0.9 % flush 10 mL  Every 12 Hours Scheduled         05/29/25 1638    05/29/25 2100  heparin (porcine) 5000 UNIT/ML injection 5,000 Units  Every 12 Hours Scheduled         05/29/25 1638    05/29/25 2000  Vital Signs  Every 4 Hours       05/29/25 1638    05/29/25 2000  Neuro Checks  Every 4 Hours       05/29/25 1638    05/29/25 1800  Oral Care  2 Times Daily       05/29/25 1638    05/29/25 1800  Incentive Spirometry  Every 4 Hours While Awake       05/29/25 1638    05/29/25 1745  NPO Diet NPO Type: Strict NPO  Diet Effective Now        Comments: Should Remain in Effect Until a Complete SLP Evaluation Occurs & a Superceding Order is Received    05/29/25 1744 05/29/25 1745  SLP Consult: Eval & Treat Communication Disorder  Once,   Status:  Canceled         05/29/25 1744 05/29/25 1730  levothyroxine (SYNTHROID, LEVOTHROID) tablet 50 mcg  Daily         05/29/25 1638    05/29/25 1730  atorvastatin (LIPITOR) tablet 10 mg  Daily         05/29/25 1638    05/29/25 1730   rivastigmine (EXELON) 13.3 MG/24HR patch 1 patch  Daily         05/29/25 1638    05/29/25 1730  sertraline (ZOLOFT) tablet 50 mg  Daily         05/29/25 1638    05/29/25 1730  sodium chloride 0.9 % infusion  Continuous         05/29/25 1638    05/29/25 1639  Intake & Output  Every Shift       05/29/25 1638    05/29/25 1639  Weigh patient  Once         05/29/25 1638    05/29/25 1639  Fall Precautions  Continuous         05/29/25 1638    05/29/25 1639  Insert Peripheral IV  Once         05/29/25 1638    05/29/25 1639  Saline Lock & Maintain IV Access  Continuous,   Status:  Canceled         05/29/25 1638    05/29/25 1639  SLP Consult: Eval & Treat Communication Disorder  Once         05/29/25 1638    05/29/25 1639  Nursing Dysphagia Screening (Complete Prior to Giving Anything By Mouth)  Once         05/29/25 1638    05/29/25 1639  Continuous Cardiac Monitoring  Continuous        Comments: Follow Standing Orders As Outlined in Process Instructions (Open Order Report to View Full Instructions)    05/29/25 1638    05/29/25 1639  Maintain IV Access  Continuous         05/29/25 1638    05/29/25 1639  Telemetry - Place Orders & Notify Provider of Results When Patient Experiences Acute Chest Pain, Dysrhythmia or Respiratory Distress  Continuous        Comments: Open Order Report to View Parameters Requiring Provider Notification    05/29/25 1638    05/29/25 1639  May Be Off Telemetry for Tests  Continuous         05/29/25 1638    05/29/25 1639  Activity - Ad Karolina  Until Discontinued         05/29/25 1638    05/29/25 1639  Seizure Precautions  Continuous,   Status:  Canceled         05/29/25 1638    05/29/25 1639  Notify Provider (With Default Parameters)  Continuous        Comments: Open Order Report to View Parameters Requiring Provider Notification    05/29/25 1638    05/29/25 1639  Inpatient Case Management  Consult  Once        Provider:  (Not yet assigned)    05/29/25 1638    05/29/25 1639  OT Consult:  "Eval & Treat ADL Performance Below Baseline  Once         05/29/25 1638    05/29/25 1639  PT Consult: Eval & Treat Functional Mobility Below Baseline  Once         05/29/25 1638    05/29/25 1638  sodium chloride 0.9 % flush 10 mL  As Needed         05/29/25 1638    05/29/25 1638  sodium chloride 0.9 % infusion 40 mL  As Needed         05/29/25 1638    05/29/25 1638  acetaminophen (TYLENOL) tablet 650 mg  Every 4 Hours PRN        Placed in \"Or\" Linked Group    05/29/25 1638    05/29/25 1638  acetaminophen (TYLENOL) 160 MG/5ML oral solution 650 mg  Every 4 Hours PRN        Placed in \"Or\" Linked Group    05/29/25 1638    05/29/25 1638  acetaminophen (TYLENOL) suppository 650 mg  Every 4 Hours PRN        Placed in \"Or\" Linked Group    05/29/25 1638    05/29/25 1638  ondansetron (ZOFRAN) injection 4 mg  Every 6 Hours PRN         05/29/25 1638    05/29/25 1638  nitroglycerin (NITROSTAT) SL tablet 0.4 mg  Every 5 Minutes PRN         05/29/25 1638    05/29/25 1638  Potassium Replacement - Follow Nurse / BPA Driven Protocol  As Needed         05/29/25 1638    05/29/25 1638  Magnesium Standard Dose Replacement - Follow Nurse / BPA Driven Protocol  As Needed         05/29/25 1638    05/29/25 1638  sennosides-docusate (PERICOLACE) 8.6-50 MG per tablet 2 tablet  2 Times Daily PRN        Placed in \"And\" Linked Group    05/29/25 1638    05/29/25 1638  polyethylene glycol (MIRALAX) packet 17 g  Daily PRN        Placed in \"And\" Linked Group    05/29/25 1638    05/29/25 1638  bisacodyl (DULCOLAX) EC tablet 5 mg  Daily PRN        Placed in \"And\" Linked Group    05/29/25 1638    05/29/25 1638  bisacodyl (DULCOLAX) suppository 10 mg  Daily PRN        Placed in \"And\" Linked Group    05/29/25 1638    05/29/25 1638  melatonin tablet 5 mg  Nightly PRN         05/29/25 1638    05/29/25 1638  Pharmacy To Dose: Ceftriaxone  Continuous PRN         05/29/25 1638    05/29/25 1638  LORazepam (ATIVAN) injection 2 mg  Every 4 Hours PRN         " 05/29/25 1638    05/29/25 1434  High Sensitivity Troponin T  STAT         05/29/25 1433    05/29/25 1431  Urinalysis With Culture If Indicated -  Once        Comments: Please ensure 'Use Existing Specimen' is selected if this order is for urine culture add-on.  If no button appears, please contact the lab for assistance.      05/29/25 1430    05/29/25 1430  Inpatient Neurology Consult General  Once        Specialty:  Neurology  Provider:  (Not yet assigned)    05/29/25 1429    05/29/25 1426  Advance Diet As Tolerated -  Until Discontinued         05/29/25 1425    05/29/25 1424  Code Status and Medical Interventions: No CPR (Do Not Attempt to Resuscitate); Limited Support; No intubation (DNI)  Continuous         05/29/25 1425    05/29/25 1223  STAT Lactic Acid, Reflex  PROCEDURE ONCE         05/29/25 1008    05/29/25 1114  Inpatient Admission  Once         05/29/25 1113    05/29/25 1048  Inpatient Neurology Consult General  STAT        Specialty:  Neurology  Provider:  Evgeny Macias MD    05/29/25 1048    05/29/25 1005  sodium chloride 0.9 % bolus 2,361 mL  Once         05/29/25 0949    05/29/25 1005  cefTRIAXone (ROCEPHIN) 2,000 mg in sodium chloride 0.9 % 100 mL IVPB-VTB  Every 24 Hours,   Status:  Discontinued         05/29/25 0949    05/29/25 0854  sodium chloride 0.9 % bolus 1,000 mL  Once         05/29/25 0838    05/29/25 0836  sodium chloride 0.9 % flush 10 mL  As Needed         05/29/25 0838    Unscheduled  Oxygen Therapy- Nasal Cannula; Titrate 1-6 LPM Per SpO2; 90 - 95%  Continuous PRN       05/29/25 0838    Unscheduled  Up With Assistance  As Needed       05/29/25 1638    Unscheduled  Oxygen Therapy- Nasal Cannula; Titrate 1-6 LPM Per SpO2; 90 - 95%  Continuous PRN       05/29/25 1638                  Physician Progress Notes (most recent note)    No notes of this type exist for this encounter.          Consult Notes (most recent note)        Evgeny Macias MD at 05/29/25 1039        Consult  "Orders    1. Inpatient Neurology Consult General [[id number]] ordered by Gopal Carmichael DO at 05/29/25 1048                 Neurology Consult Note    Consult Date: 5/29/2025    Referring MD: No ref. provider found    Reason for Consult I have been asked to see the patient in neurological consultation to render advice and opinion regarding new onset seizure-like activity.    History as per chart review due to patient altered mental status and severe dementia at baseline.  Cassie Gomez is a 70 y.o. white female with known diagnosis of type 2 diabetes, mixed hyperlipidemia, thyroid disease, depression, Alzheimer disease with advanced dementia diagnosed in 2016 on Namenda, from her prior neurology notes in 2023 at baseline the patient oriented x 0 and does not follow commands.  Reportedly at her nursing home she had witnessed seizure-like activity that lasted for 3 to 4 minutes and?  Biting her tongue, no reported prior history of seizures.  Her workup on this admission showed possible urinary tract infection with WBC in urine 3-5, bacteria 4+, 1+ leukocyte esterase, nitrite positive.  Her CT scan of the head read as possible normal pressure hydrocephalus compared to prior CT head in 2020.  I do not think this is a normal pressure hydrocephalus and I believe this is progression of her brain atrophy causing ex-vacuo brain dilatation as her prior CT head in 2020 also showed brain atrophy and ventricular dilatation although was to a lesser extent.  Currently the patient seems at her baseline.     Past Medical History:   Diagnosis Date    Alzheimer disease     Colon polyp     Depression     Diabetes mellitus 2000    Essential hypertension 05/24/2018    H/O bone density study 2011    normal    Memory loss     Mixed hyperlipidemia 07/06/2017    Thyroid disease     Thyroid enlargement        Exam  /73   Pulse 85   Temp 97.1 °F (36.2 °C) (Axillary)   Resp 20   Ht 167.6 cm (65.98\")   Wt 78.7 kg (173 lb 8 oz)   " SpO2 96%   BMI 28.02 kg/m²   Gen: NAD, vitals reviewed  MS: oriented x0 which is her baseline, did not follow any commands, recent/remote memory impaired, poor attention/concentration, unable to assess language due to patient condition, unable to assess neglect.  CN: Her eyes tightly closed and resists eye opening, pupils equal and reactive to the light bilaterally, no obvious facial droop to supraorbital pain, unable to assess dysarthria  Motor: Spontaneously moving lower extremities, withdrew to pain on the upper extremities with less withdrawal on the right upper extremity  Sensory exam: Unable to assess    DATA:    Lab Results   Component Value Date    GLUCOSE 174 (H) 05/29/2025    CALCIUM 10.0 05/29/2025     05/29/2025    K 4.6 05/29/2025    CO2 23.1 05/29/2025     05/29/2025    BUN 17.0 05/29/2025    CREATININE 1.33 (H) 05/29/2025    EGFRIFAFRI 77 12/01/2020    EGFRIFNONA 63 12/01/2020    BCR 12.8 05/29/2025    ANIONGAP 13.9 05/29/2025     Lab Results   Component Value Date    WBC 19.22 (H) 05/29/2025    HGB 14.7 05/29/2025    HCT 47.1 (H) 05/29/2025    MCV 90.1 05/29/2025     05/29/2025       Lab review: Possible urinary tract infection, glucose 174, BUN 17, creatinine 1.3, WBC 19.2, magnesium 2.2, TSH 3.72.    Imaging review: I personally reviewed her CT scan of the head read by radiologist as possible normal pressure hydrocephalus compared to prior CT head in 2020.  I do not think this is a normal pressure hydrocephalus and I believe this is progression of her brain atrophy causing ex-vacuo brain dilatation as her prior CT head in 2020 also showed brain atrophy and ventricular dilatation although was to a lesser extent.    Diagnoses:  - New onset seizure-like activity likely provoked secondary to urinary tract infection and metabolic abnormalities  - Advanced Alzheimer dementia  - Abnormal CT scan of the head with advanced brain atrophy, central volume loss and ex-vacuo ventricular  "dilatation  - Possible urinary tract infection      PLAN:   - Routine EEG  - MRI brain with and without contrast  - Check phosphorus, ionized calcium  - Hold on antiseizure medications unless EEG showing abnormal discharges  - Use Ativan 2 mg IV for tonic-clonic seizures lasting more than 3 minutes  - Seizure precautions as detailed below  - Treatment of UTI and metabolic abnormalities as per the primary    Discussed with ER physician Dr. Carmichael via epic chat.    Seizure Precautions:  ·    Do not drive 90 days. (this is to include: car, bicycle, or motorcycle)  ·    Do not drive operate dangerous equipment such as power tools, heavy machinery with moving parts, or an open flame.  ·    Do not swim alone (this would include a bathtub full of water is a small swimming pool).  ·    Avoid unsecured heights. (this is to include: scaffolding, ladders, trees, and roofs).    Medical Decision Making for this neurology consultation consists of the following:  Review of previous chart, including H/P, provider and nursing notes as applicable.  Review of medications and vital signs.  Review of previous labs, neuroimaging, and additional relevant diagnostics.   Interpretation of laboratory, imaging, and other diagnostic results  Discussion with other providers and family   Total face-to-face/floor time: 30-61 minutes.       This was an audio and video visit enabled telemedicine encounter.    The consult was conducted in real time using interactive audio and video technology. Patient/Family was informed of the technology being used for this visit and agreed proceed. Patient located in hospital at [facility] and I'm the provider located at home/office based setting in [place], [place].    \"Dictated utilizing Dragon dictation\".         Electronically signed by Evgeny Macias MD at 05/29/25 1050       "

## 2025-05-30 NOTE — PROGRESS NOTES
[facility]    Name:  Cassie Vasquez Jason   Age:  70 y.o.  Sex:  female  :  1955  MRN:  [id number]   Visit Number:  [id number]  Admission Date:  2025  Date Of Service:  25  Primary Care Physician:  Mariano Gordon DO     LOS: 1 day :    Chief Complaint:      Seizure-like activity    Subjective:    Patient seen and examined with no family at bedside.  She appears chronically ill.  Does not respond to any questions.  Does not open eyes.    Hospital Course:    Patient is 70 years old female with a past medical history of Alzheimer disease, depression, diabetes mellitus, hypertension, hyperlipidemia, hypothyroidism, presented to the ER from the nursing home with a chief complaint of seizure-like activity.  Patient was eating breakfast  prior to arrival and she suddenly started having convulsions that lasted around 3 minutes.  Witnessed per nursing home staff. History otherwise limited with her history of Alzheimer dementia.  All history was obtained per ER and nursing home report.  Patient appears alert but keeping her eyes closed and nonverbal upon exam.  Per son, patient has severe Alzheimer's disease, is not oriented to self.  Does require a feeder, however is mostly alert.  No history of seizure disorder.     On ER evaluation, patient was tachycardic on arrival with a heart rate of 122 otherwise afebrile with stable blood pressure.  Workup in the ER was significant for HS Troponin of 28-36 with delta change of 8, creatinine 1.3, glucose 172, lactate 2.2, WBC 19.2.  Urinalysis consistent with infection.  Chest x-ray with no acute process.  Respiratory panel negative.  CT head without contrast showed Interval increase in size of ventricles which appears disproportionate to the sulci, normal pressure hydrocephalus not excluded.  Teleneurology Dr. Macias consulted and felt that her CT findings more consistent with brain atrophy, recommended admission for  MRI and EEG.  Patient received Ativan 1 mg, total of 3.3 L of normal saline boluses and was started on Rocephin while in the ER.  Hospitalist consulted for admission, further management and treatment.  MRI and EEG pending.    Review of Systems:     All systems were reviewed and negative except as mentioned in subjective, assessment and plan.    Vital Signs:    Temp:  [98.9 °F (37.2 °C)-99.6 °F (37.6 °C)] 98.9 °F (37.2 °C)  Resp:  [16-18] 16  BP: (111-135)/(51-69) 135/58    Intake and output:    I/O last 3 completed shifts:  In: 3461 [IV Piggyback:3461]  Out: -   No intake/output data recorded.    Physical Examination:    General Appearance:  Chronically ill-appearing.  Sleeping.   Head:  Atraumatic and normocephalic.   Eyes: Conjunctivae and sclerae normal, no icterus. No pallor.   Throat: No oral lesions, no thrush, oral mucosa moist.   Neck: Supple, trachea midline, no thyromegaly.   Lungs:   Breath sounds heard bilaterally equally.  Unlabored on room air.   Heart:  Normal S1 and S2, no murmur, no gallop, no rub. No JVD.   Abdomen:   Normal bowel sounds, no masses, no organomegaly. Soft, nontender, nondistended, no rebound tenderness.   Extremities: Supple, no edema, no cyanosis, no clubbing.   Skin: No bleeding or rash.   Neurologic: Does not open eyes.  Does not respond to questions.  No facial asymmetry. Moves all four limbs. No tremors.  Severe generalized weakness.     Laboratory results:    Results from last 7 days   Lab Units 05/30/25  0620 05/29/25  0835   SODIUM mmol/L 140 138   POTASSIUM mmol/L 4.1 4.6   CHLORIDE mmol/L 108* 101   CO2 mmol/L 20.6* 23.1   BUN mg/dL 11.0 17.0   CREATININE mg/dL 1.22* 1.33*   CALCIUM mg/dL 9.2 10.0   BILIRUBIN mg/dL  --  0.4   ALK PHOS U/L  --  107   ALT (SGPT) U/L  --  7   AST (SGOT) U/L  --  18   GLUCOSE mg/dL 123* 174*     Results from last 7 days   Lab Units 05/30/25  0620 05/29/25  0835   WBC 10*3/mm3 9.39 19.22*   HEMOGLOBIN g/dL 14.2 14.7   HEMATOCRIT % 44.8 47.1*  "  PLATELETS 10*3/mm3 205 340         Results from last 7 days   Lab Units 05/30/25  0620 05/29/25  1455 05/29/25  1008   HSTROP T ng/L 29* 36* 36*     Results from last 7 days   Lab Units 05/29/25  1027   BLOODCX  No growth at 24 hours  No growth at 24 hours     No results for input(s): \"PHART\", \"RRL2KWD\", \"PO2ART\", \"YCU0WQL\", \"BASEEXCESS\" in the last 8760 hours.   I have reviewed the patient's laboratory results.    Radiology results:    CT Head Without Contrast  Result Date: 5/29/2025  PROCEDURE: CT HEAD WO CONTRAST-  HISTORY: seizure  COMPARISON: May 1, 2020..  TECHNIQUE: Multiple axial CT images were performed from the foramen magnum to the vertex. Individualized dose reduction techniques using automated exposure control or adjustment of mA and/or kV according to the patient size were employed.  FINDINGS: There is moderate generalized cerebral atrophy. The ventricles are moderately enlarged, mildly increased from prior exam and appear disproportionate to the prominent sulci. Normal pressure hydrocephalus is not excluded. Mild small vessel ischemic disease noted. There is no evidence of edema or hemorrhage.  No masses are identified. No extra-axial fluid is seen. The paranasal sinuses are unremarkable.      Impression: Interval increase in size of ventricles which appears disproportionate to the sulci, normal pressure hydrocephalus not excluded.     CTDI: 39.49 mGy DLP:726.89 mGy.cm  This report was signed and finalized on 5/29/2025 10:06 AM by Trinity Sanchez MD.      XR Chest 1 View  Result Date: 5/29/2025  PROCEDURE: XR CHEST 1 VW-  HISTORY: ams  COMPARISON: None.  FINDINGS: The heart is normal in size. The lungs are clear. The mediastinum is unremarkable. There is no pneumothorax.  There are no acute osseous abnormalities. Apical lordotic positioning noted.      Impression: No acute cardiopulmonary process.     This report was signed and finalized on 5/29/2025 9:42 AM by Trinity Sanchez MD.      I have reviewed " the patient's radiology reports.    Medication Review:     I have reviewed the patient's active and prn medications.     Problem List:      Seizure disorder      Assessment:    Seizure-like activity, POA  Acute UTI, POA  Sepsis ruled out  Elevated troponin, likely demand ischemia, POA  Advanced Alzheimer dementia  Depression  Diabetes mellitus  Hypertension  Hyperlipidemia  Hypothyroidism    Plan:    Seizure-like activity  - New onset seizure-like activity likely provoked and secondary to UTI   - Neurology consulted and following, appreciate recommendations.  - EEG ordered-only performed at this facility on Tuesday and Thursday.  Can have performed outpatient if patient ready for discharge over the weekend.  - MRI brain with and without contrast ordered and pending  - Hold on antiseizure medications unless EEG showing abnormal discharges  - Use Ativan 2 mg IV for tonic-clonic seizures lasting more than 3 minutes  - Seizure precautions   - Treatment of UTI      Acute UTI  Severe sepsis  -Met SIRS criteria with tachycardia and leukocytosis-sepsis ruled out with blood cultures negative to date.  -Received IV fluid boluses in the ER  -Continue with maintenance IV fluids for now as patient remains lethargic with decreased p.o. intake.  - Continue with Rocephin  - Follow-up on urine cultures and blood cultures-negative to date.     Alzheimer dementia  Hypertension  Hyperlipidemia  -Continue home meds as warranted.  -Holding on antihypertensives due to soft blood pressure  -Patient more lethargic than usual, however does appear near baseline status otherwise.     - Updated son Juan Carlos on plan of care with all questions answered.    I have reviewed the copied text and it is accurate as of 5/30/2025      DVT Prophylaxis: Heparin prophylaxis   Code Status: DNR/DNI  Diet: Puréed nectar thickened liquids feeder as awakens.  Discharge Plan: HECTOR Johnson, APRN  05/30/25  15:16 EDT    Dictated utilizing Dragon  dictation.

## 2025-05-30 NOTE — THERAPY DISCHARGE NOTE
PT order was received.  Patient's baseline of mobility is dependent.  The facility uses a mechanical lift to transfer her from bed to wheelchair.  PT will sign off at this time.

## 2025-05-30 NOTE — CASE MANAGEMENT/SOCIAL WORK
Case Management/Social Work    Patient Name:  Cassie Vasquez Jason  YOB: 1955  MRN: [id number]  Admit Date:  5/29/2025        PATY sent message to [facility] to see if pt can return to facility when medically ready. PATY following.     13:16 EDT SW confirmed with facility pt is a LTC resident and can return back when ready. PATY Following,     Electronically signed by:  BENJI Daigle  05/30/25 12:47 EDT

## 2025-05-30 NOTE — CASE MANAGEMENT/SOCIAL WORK
Discharge Planning Assessment   [facility]     Patient Name: Cassie Vasquez Jason  MRN: [id number]  Today's Date: 5/30/2025    Admit Date: 5/29/2025        Discharge Needs Assessment    No documentation.                  Discharge Plan       Row Name 05/30/25 1321       Plan    Plan Comments Pt is non verbal. Spoke with her son, Juan Carlos MENCHACA Jason. He confirms she will be returninh to [facility] for LTC. PATY Nuñeze confirmed with [facility] pt can return when medically stable.                  Continued Care and Services - Admitted Since 5/29/2025       Destination Coordination complete.      Service Provider Request Status Services Address Phone Fax Patient Preferred    [facility]  Selected Nursing Home [address] [facility] [address] [phone] --                     Demographic Summary    No documentation.                  Functional Status    No documentation.                  Psychosocial    No documentation.                  Abuse/Neglect    No documentation.                  Legal    No documentation.                  Substance Abuse    No documentation.                  Patient Forms    No documentation.                     Keya Villagran, RN

## 2025-05-30 NOTE — PROGRESS NOTES
"DOS: 2025  NAME: Cassie Vasquez Jason   : 1955  PCP: Mariano Gordon DO  Chief Complaint   Patient presents with    Seizures       Chief complaint: Altered mental status, new onset seizure-like activity  Subjective: Patient seen and examined at the bedside via teleneurology video call with the help of the rounding nurse.  No more seizure-like activity overnight.  On Rocephin for urinary tract infection.    Objective:  Vital signs: /58 (BP Location: Left leg, Patient Position: Lying)   Pulse 87   Temp 98.9 °F (37.2 °C) (Axillary)   Resp 16   Ht 172.7 cm (68\")   Wt 79.9 kg (176 lb 2.4 oz)   SpO2 98%   BMI 26.78 kg/m²    No significant change on exam compared to yesterday  Gen: NAD, vitals reviewed  MS: oriented x0 which is her baseline, did not follow any commands, recent/remote memory impaired, poor attention/concentration, unable to assess language due to patient condition, unable to assess neglect.  CN: Her eyes tightly closed and resists eye opening, unable to assess her pupils, no obvious facial droop to supraorbital pain, unable to assess dysarthria  Motor: Spontaneously moving all extremities  Sensory exam: Withdrew to pain throughout    Laboratory results:  Lab Results   Component Value Date    GLUCOSE 123 (H) 2025    CALCIUM 9.2 2025     2025    K 4.1 2025    CO2 20.6 (L) 2025     (H) 2025    BUN 11.0 2025    CREATININE 1.22 (H) 2025    EGFRIFAFRI 77 2020    EGFRIFNONA 63 2020    BCR 9.0 2025    ANIONGAP 11.4 2025     Lab Results   Component Value Date    WBC 9.39 2025    HGB 14.2 2025    HCT 44.8 2025    MCV 87.3 2025     2025     Lab Results   Component Value Date    LDL 63 2020    LDL 70 2020    LDL 59 2019            Review of labs: Ionized calcium normal, magnesium normal, phosphorus slightly low at 2.3, urinary tract infection, glucose 174, " BUN 17, creatinine 1.3, WBC 19.2, magnesium 2.2, TSH 3.72, procalcitonin negative, respiratory panel negative, lactate was elevated yesterday at 2.2 today improved to 1.3.     Review and interpretation of imaging: No new brain imaging to review.    I personally reviewed her CT scan of the head dated 5/29 read by radiologist as possible normal pressure hydrocephalus compared to prior CT head in 2020. I do not think this is a normal pressure hydrocephalus and I believe this is progression of her brain atrophy causing ex-vacuo brain dilatation as her prior CT head in 2020 also showed brain atrophy and ventricular dilatation although was to a lesser extent.     Diagnoses:  - New onset seizure-like activity likely provoked secondary to urinary tract infection and metabolic abnormalities  - Advanced Alzheimer dementia  - Abnormal CT scan of the head with advanced brain atrophy, central volume loss and ex-vacuo ventricular dilatation  - Urinary tract infection with severe sepsis        PLAN:   - Routine EEG pending, per nursing staff could not be done today or over the weekend and only available on Tuesdays and Thursdays.  In this case this can be arranged as an outpatient.  - MRI brain with and without contrast  - Hold on antiseizure medications unless EEG showing abnormal discharges  - Use Ativan 2 mg IV for tonic-clonic seizures lasting more than 3 minutes  - Seizure precautions as detailed below  - Treatment of UTI and metabolic abnormalities as per the primary    I will follow peripherally on the MRI brain and EEG.     Discussed with Dr. Kang via epic chat.     Seizure Precautions:  ·    Do not drive 90 days. (this is to include: car, bicycle, or motorcycle)  ·    Do not drive operate dangerous equipment such as power tools, heavy machinery with moving parts, or an open flame.  ·    Do not swim alone (this would include a bathtub full of water is a small swimming pool).  ·    Avoid unsecured heights. (this is to  include: scaffolding, ladders, trees, and roofs).    This was an audio and video visit enabled telemedicine encounter.    The consult was conducted in real time using interactive audio and video technology. Patient/Family was informed of the technology being used for this visit and agreed proceed. Patient located in hospital at [facility] and I'm the provider located at home/office based setting in [place], [place].

## 2025-05-30 NOTE — PLAN OF CARE
Goal Outcome Evaluation:  Plan of Care Reviewed With: patient        Progress: no change  Outcome Evaluation: VSS; no seizure activity noted overnight; pt would have confused conversation when pt care being done otherwise rested quietly

## 2025-05-30 NOTE — PLAN OF CARE
Goal Outcome Evaluation:  Plan of Care Reviewed With: patient            Interventions implemented as appropriate.

## 2025-05-30 NOTE — PLAN OF CARE
Goal Outcome Evaluation:              Outcome Evaluation: Bedside eval of swallow completed with pt. seated upright in bed for po trials. Pt. had significant cognitive deficits affecting ability to respond to questions or follow directions and is effectively non-verbal. She was accepting of po trials and was given trials of puree, nectar-thick liq, and thin liquids. Oral phase was affected by severity of cognitive deficits affecting awareness and safety, exhibited by abnormal hold of bolus intermittently. Suspect pharyngeal phase dysphagia with decreased bolus control demonstrated by throat clearing with thin liquids via open cup, but pt. demonstrated better control with straw use. She is at risk of aspiration due to cognitive deficits affecting awareness. She is also at some risk of malnutrition and dehydration as well and may benefit from nutritional supplements. She will need feeder and monitoring with po. Recommend: 1. pureed diet with nectar-thick liq; sips H20 between meals only as henok, 2. meds crushed as allowable with pudding/applesauce, 3. straws for liquids, 4.aspiration precautions, 5. nutritional supplements suggested, 6. pt. needs feeder for po with monitoring for safety.    Anticipated Discharge Disposition (SLP): skilled nursing facility, long term acute care facility          SLP Swallowing Diagnosis: oral dysphagia, suspected pharyngeal dysphagia (05/30/25 1200)

## 2025-05-30 NOTE — THERAPY EVALUATION
Acute Care - Speech Language Pathology   Swallow Initial Evaluation  Jim     Patient Name: Cassie Vasquez Jason  : 1955  MRN: [id number]  Today's Date: 2025               Admit Date: 2025    Visit Dx:     ICD-10-CM ICD-9-CM   1. Altered mental status, unspecified altered mental status type  R41.82 780.97   2. Seizure-like activity  R56.9 780.39   3. UTI (urinary tract infection) with pyuria  N39.0 599.0   4. Leukocytosis, unspecified type  D72.829 288.60   5. Elevated lactic acid level  R79.89 276.2   6. Elevated troponin  R79.89 790.6     Patient Active Problem List   Diagnosis    Severe late onset Alzheimer's dementia with mood disturbance    Type 2 diabetes mellitus without complication, without long-term current use of insulin    Acquired hypothyroidism    Mixed hyperlipidemia    Irritable bowel syndrome with both constipation and diarrhea    Essential hypertension    Depressive disorder    Age-related physical debility    Impaired mobility and ADLs    Seizure disorder     Past Medical History:   Diagnosis Date    Alzheimer disease     Colon polyp     Depression     Diabetes mellitus 2000    Essential hypertension 2018    H/O bone density study     normal    Memory loss     Mixed hyperlipidemia 2017    Seizure 2025    Thyroid disease     Thyroid enlargement      Past Surgical History:   Procedure Laterality Date    ADENOIDECTOMY       SECTION  1978    TONSILLECTOMY         SLP Recommendation and Plan  SLP Swallowing Diagnosis: oral dysphagia, suspected pharyngeal dysphagia (25 1201)  SLP Diet Recommendation: puree, nectar thick liquids, water between meals after oral care, with supervision, nutritional supplements needed (25 1201)  Recommended Precautions and Strategies: upright posture during/after eating, general aspiration precautions, assist with feeding, 1:1 supervision (25 1201)  SLP Rec. for Method of Medication Administration: meds  crushed, with puree, as tolerated (05/30/25 1201)     Monitor for Signs of Aspiration: notify SLP if any concerns, yes, cough, gurgly voice, throat clearing, pneumonia, right lower lobe infiltrates (05/30/25 1201)  Recommended Diagnostics: No further SLP services recommended (05/30/25 1201)  Swallow Criteria for Skilled Therapeutic Interventions Met: not appropriate (05/30/25 1201)  Anticipated Discharge Disposition (SLP): skilled nursing facility, long term acute care facility (05/30/25 1201)           Oral Care Recommendations: Oral Care BID/PRN, Swab (05/30/25 1201)                                        Outcome Evaluation: Bedside eval of swallow completed with pt. seated upright in bed for po trials. Pt. had significant cognitive deficits affecting ability to respond to questions or follow directions and is effectively non-verbal. She was accepting of po trials and was given trials of puree, nectar-thick liq, and thin liquids. Oral phase was affected by severity of cognitive deficits affecting awareness and safety, exhibited by abnormal hold of bolus intermittently. Suspect pharyngeal phase dysphagia with decreased bolus control demonstrated by throat clearing with thin liquids via open cup, but pt. demonstrated better control with straw use. She is at risk of aspiration due to cognitive deficits affecting awareness. She is also at some risk of malnutrition and dehydration as well and may benefit from nutritional supplements. She will need feeder and monitoring with po. Recommend: 1. pureed diet with nectar-thick liq; sips H20 between meals only as henok, 2. meds crushed as allowable with pudding/applesauce, 3. straws for liquids, 4.aspiration precautions, 5. nutritional supplements suggested, 6. pt. needs feeder for po with monitoring for safety.      SWALLOW EVALUATION (Last 72 Hours)       SLP Adult Swallow Evaluation       Row Name 05/30/25 1201                   Rehab Evaluation    Document Type evaluation   -TM        Patient Observations unable to respond  poor awareness of environment due to cognitive deficits  -TM        Patient/Family/Caregiver Comments/Observations no family present  -TM        Patient Effort fair  -TM           General Information    Patient Profile Reviewed yes  -TM        Pertinent History Of Current Problem dementia, seizure, DMII  -TM        Current Method of Nutrition NPO  -TM        Precautions/Limitations, Vision difficult to assess;WFL;for purposes of eval  -TM        Precautions/Limitations, Hearing difficult to assess;WFL;for purposes of eval  -TM        Prior Level of Function-Communication cognitive-linguistic impairment  -TM        Prior Level of Function-Swallowing mechanical ground textures;thin liquids;assistance needed  -TM        Plans/Goals Discussed with patient;other (see comments)  RN  -TM        Barriers to Rehab medically complex;cognitive status  -TM        Patient's Goals for Discharge patient could not state  -TM           Pain    Additional Documentation Pain Scale: FACES Pre/Post-Treatment (Group)  -TM           Pain Scale: FACES Pre/Post-Treatment    Pain: FACES Scale, Pretreatment 0-->no hurt  -TM        Posttreatment Pain Rating 0-->no hurt  -TM           Oral Motor Structure and Function    Oral Lesions or Structural Abnormalities and/or variants none noted with cursory exam; pt. unable to follow directions for more formal exam  -TM        Dentition Assessment natural, present and adequate  -TM        Secretion Management WNL/WFL  -TM        Volitional Cough unable to elicit  -TM           Oral Musculature and Cranial Nerve Assessment    Oral Motor General Assessment generalized oral motor weakness  -TM           General Eating/Swallowing Observations    Respiratory Support Currently in Use room air  -TM        Eating/Swallowing Skills fed by SLP;needed assist;unable to perform self-feeding;unaware of safety concerns  -TM        Positioning During Eating upright  in bed  -TM        Utensils Used spoon;straw;cup  -TM        Consistencies Trialed pureed;nectar/syrup-thick liquids;thin liquids  -TM           Respiratory    Respiratory Status WFL;room air  -TM           Clinical Swallow Eval    Oral Prep Phase impaired  -TM        Oral Transit WFL  -TM        Oral Residue WFL  -TM        Pharyngeal Phase suspected pharyngeal impairment  -TM        Clinical Swallow Evaluation Summary Bedside eval of swallow completed with pt. seated upright in bed for po trials. Pt. had significant cognitive deficits affecting ability to respond to questions or follow directions and is effectively non-verbal. She was accepting of po trials and was given trials of puree, nectar-thick liq, and thin liquids. Oral phase was affected by severity of cognitive deficits affecting awareness and safety, exhibited by abnormal hold of bolus intermittently. Suspect pharyngeal phase dysphagia with decreased bolus control demonstrated by throat clearing with thin liquids via open cup, but pt. demonstrated better control with straw use.  She is at risk of aspiration due to cognitive deficits affecting awareness.  She is also at some risk of malnutrition and dehydration as well and may benefit from nutritional supplements. She will need feeder and monitoring with po. Recommend:  1. pureed diet with nectar-thick liq; sips H20 between meals only as henok, 2. meds crushed as allowable with pudding/applesauce, 3. straws for liquids, 4.aspiration precautions, 5. nutritional supplements suggested, 6. pt. needs feeder for po with monitoring for safety.  -TM           Oral Prep Concerns    Oral Prep Concerns oral holding;other (see comments)  cognitive deficits affect awareness with po  -TM        Oral Holding other (see comments)  various  -TM           Pharyngeal Phase Concerns    Pharyngeal Phase Concerns throat clear  -TM        Throat Clear thin  -TM           SLP Evaluation Clinical Impression    SLP Swallowing  Diagnosis oral dysphagia;suspected pharyngeal dysphagia  -TM        Functional Impact risk of aspiration/pneumonia;risk of malnutrition;risk of dehydration  -        Swallow Criteria for Skilled Therapeutic Interventions Met not appropriate  -TM           SLP Treatment Clinical Impressions    Barriers to Overall Progress (SLP) Medically complex;Cognitive status  -TM           Recommendations    SLP Diet Recommendation puree;nectar thick liquids;water between meals after oral care, with supervision;nutritional supplements needed  -TM        Recommended Diagnostics No further SLP services recommended  -TM        Recommended Precautions and Strategies upright posture during/after eating;general aspiration precautions;assist with feeding;1:1 supervision  -TM        Oral Care Recommendations Oral Care BID/PRN;Swab  -TM        SLP Rec. for Method of Medication Administration meds crushed;with puree;as tolerated  -        Monitor for Signs of Aspiration notify SLP if any concerns;yes;cough;gurgly voice;throat clearing;pneumonia;right lower lobe infiltrates  -        Anticipated Discharge Disposition (SLP) skilled nursing facility;long term acute care facility  -                  User Key  (r) = Recorded By, (t) = Taken By, (c) = Cosigned By      Initials Name Effective Dates     Loren Hodge 06/16/21 -                     EDUCATION  The patient has been educated in the following areas:   Dysphagia (Swallowing Impairment) Oral Care/Hydration.                Time Calculation:    Time Calculation- SLP       Row Name 05/30/25 1354             Time Calculation- SLP    SLP Start Time 1201  -      SLP Received On 05/30/25  -                User Key  (r) = Recorded By, (t) = Taken By, (c) = Cosigned By      Initials Name Provider Type     Loren Hodge Speech and Language Pathologist                    Therapy Charges for Today       Code Description Service Date Service Provider Modifiers Qty    [id number]  [facility] LESLIE ORAL PHARYNG SWALLOW 4 5/30/2025 Loren Hodge GN 1                 Loren Hodge  5/30/2025

## 2025-05-31 LAB
ANION GAP SERPL CALCULATED.3IONS-SCNC: 13.4 MMOL/L (ref 5–15)
BASOPHILS # BLD AUTO: 0.08 10*3/MM3 (ref 0–0.2)
BASOPHILS NFR BLD AUTO: 0.9 % (ref 0–1.5)
BUN SERPL-MCNC: 8 MG/DL (ref 8–23)
BUN/CREAT SERPL: 7.3 (ref 7–25)
CALCIUM SPEC-SCNC: 9.2 MG/DL (ref 8.6–10.5)
CHLORIDE SERPL-SCNC: 111 MMOL/L (ref 98–107)
CO2 SERPL-SCNC: 17.6 MMOL/L (ref 22–29)
CREAT SERPL-MCNC: 1.09 MG/DL (ref 0.57–1)
DEPRECATED RDW RBC AUTO: 45.2 FL (ref 37–54)
EGFRCR SERPLBLD CKD-EPI 2021: 54.4 ML/MIN/1.73
EOSINOPHIL # BLD AUTO: 0.2 10*3/MM3 (ref 0–0.4)
EOSINOPHIL NFR BLD AUTO: 2.2 % (ref 0.3–6.2)
ERYTHROCYTE [DISTWIDTH] IN BLOOD BY AUTOMATED COUNT: 14 % (ref 12.3–15.4)
GLUCOSE SERPL-MCNC: 110 MG/DL (ref 65–99)
HCT VFR BLD AUTO: 39.3 % (ref 34–46.6)
HGB BLD-MCNC: 12.3 G/DL (ref 12–15.9)
IMM GRANULOCYTES # BLD AUTO: 0.02 10*3/MM3 (ref 0–0.05)
IMM GRANULOCYTES NFR BLD AUTO: 0.2 % (ref 0–0.5)
LYMPHOCYTES # BLD AUTO: 2.36 10*3/MM3 (ref 0.7–3.1)
LYMPHOCYTES NFR BLD AUTO: 26.5 % (ref 19.6–45.3)
MCH RBC QN AUTO: 27.8 PG (ref 26.6–33)
MCHC RBC AUTO-ENTMCNC: 31.3 G/DL (ref 31.5–35.7)
MCV RBC AUTO: 88.9 FL (ref 79–97)
MONOCYTES # BLD AUTO: 0.74 10*3/MM3 (ref 0.1–0.9)
MONOCYTES NFR BLD AUTO: 8.3 % (ref 5–12)
NEUTROPHILS NFR BLD AUTO: 5.5 10*3/MM3 (ref 1.7–7)
NEUTROPHILS NFR BLD AUTO: 61.9 % (ref 42.7–76)
NRBC BLD AUTO-RTO: 0 /100 WBC (ref 0–0.2)
PLATELET # BLD AUTO: 266 10*3/MM3 (ref 140–450)
PMV BLD AUTO: 10.8 FL (ref 6–12)
POTASSIUM SERPL-SCNC: 4.1 MMOL/L (ref 3.5–5.2)
RBC # BLD AUTO: 4.42 10*6/MM3 (ref 3.77–5.28)
SODIUM SERPL-SCNC: 142 MMOL/L (ref 136–145)
WBC NRBC COR # BLD AUTO: 8.9 10*3/MM3 (ref 3.4–10.8)

## 2025-05-31 PROCEDURE — 25010000002 HEPARIN (PORCINE) PER 1000 UNITS: Performed by: FAMILY MEDICINE

## 2025-05-31 PROCEDURE — 99233 SBSQ HOSP IP/OBS HIGH 50: CPT | Performed by: STUDENT IN AN ORGANIZED HEALTH CARE EDUCATION/TRAINING PROGRAM

## 2025-05-31 PROCEDURE — 99232 SBSQ HOSP IP/OBS MODERATE 35: CPT | Performed by: STUDENT IN AN ORGANIZED HEALTH CARE EDUCATION/TRAINING PROGRAM

## 2025-05-31 PROCEDURE — 25010000002 CEFTRIAXONE PER 250 MG: Performed by: FAMILY MEDICINE

## 2025-05-31 PROCEDURE — 80048 BASIC METABOLIC PNL TOTAL CA: CPT | Performed by: NURSE PRACTITIONER

## 2025-05-31 PROCEDURE — 85025 COMPLETE CBC W/AUTO DIFF WBC: CPT | Performed by: NURSE PRACTITIONER

## 2025-05-31 RX ADMIN — SERTRALINE 50 MG: 50 TABLET, FILM COATED ORAL at 09:19

## 2025-05-31 RX ADMIN — QUETIAPINE FUMARATE 50 MG: 25 TABLET ORAL at 21:07

## 2025-05-31 RX ADMIN — RIVASTIGMINE 1 PATCH: 13.3 PATCH TRANSDERMAL at 10:51

## 2025-05-31 RX ADMIN — Medication 10 ML: at 21:07

## 2025-05-31 RX ADMIN — ATORVASTATIN CALCIUM 10 MG: 10 TABLET, FILM COATED ORAL at 09:19

## 2025-05-31 RX ADMIN — MEMANTINE 10 MG: 5 TABLET ORAL at 21:07

## 2025-05-31 RX ADMIN — HEPARIN SODIUM 5000 UNITS: 5000 INJECTION INTRAVENOUS; SUBCUTANEOUS at 21:07

## 2025-05-31 RX ADMIN — SODIUM CHLORIDE 1000 MG: 9 INJECTION, SOLUTION INTRAVENOUS at 10:51

## 2025-05-31 RX ADMIN — MEMANTINE 10 MG: 5 TABLET ORAL at 09:19

## 2025-05-31 RX ADMIN — Medication 10 ML: at 09:20

## 2025-05-31 RX ADMIN — HEPARIN SODIUM 5000 UNITS: 5000 INJECTION INTRAVENOUS; SUBCUTANEOUS at 09:19

## 2025-05-31 RX ADMIN — LEVOTHYROXINE SODIUM 50 MCG: 0.05 TABLET ORAL at 09:19

## 2025-05-31 NOTE — PLAN OF CARE
Goal Outcome Evaluation:  Plan of Care Reviewed With: patient        Progress: improving  Outcome Evaluation: VSS; pt able to swallow meds crushed in applesauce overnight-kept eyes closed but followed commands; no acute events overnight

## 2025-05-31 NOTE — PLAN OF CARE
Goal Outcome Evaluation:            Ongoing. Progressing.      Problem: Adult Inpatient Plan of Care  Goal: Plan of Care Review  Outcome: Progressing  Goal: Patient-Specific Goal (Individualized)  Outcome: Progressing  Goal: Absence of Hospital-Acquired Illness or Injury  Outcome: Progressing  Intervention: Identify and Manage Fall Risk  Description: Perform standard risk assessment on admission using a validated tool or comprehensive approach appropriate to the patient; reassess fall risk frequently, with change in status or transfer to another level of care.Communicate risk to interprofessional healthcare team; ensure fall risk visible cue.Determine need for increased observation, equipment and environmental modification, as well as use of supportive, nonskid footwear.Adjust safety measures to individual needs and identified risk factors.Reinforce the importance of active participation with fall risk prevention, safety, and physical activity with the patient and family.Perform regular intentional rounding to assess need for position change, pain assessment and personal needs, including assistance with toileting.  Recent Flowsheet Documentation  Taken 5/31/2025 1600 by Jessika Cristina, RN  Safety Promotion/Fall Prevention:   safety round/check completed   fall prevention program maintained   clutter free environment maintained   nonskid shoes/slippers when out of bed  Taken 5/31/2025 1400 by Jessika Cristina, RN  Safety Promotion/Fall Prevention:   safety round/check completed   fall prevention program maintained   clutter free environment maintained   nonskid shoes/slippers when out of bed  Taken 5/31/2025 1200 by Jessika Cristina, RN  Safety Promotion/Fall Prevention:   safety round/check completed   fall prevention program maintained   clutter free environment maintained   nonskid shoes/slippers when out of bed  Taken 5/31/2025 1000 by Jessika Cristina, RN  Safety Promotion/Fall Prevention:   safety round/check  completed   fall prevention program maintained   clutter free environment maintained   nonskid shoes/slippers when out of bed  Taken 5/31/2025 0800 by Jessika Cristina RN  Safety Promotion/Fall Prevention:   safety round/check completed   fall prevention program maintained   clutter free environment maintained   nonskid shoes/slippers when out of bed  Intervention: Prevent Skin Injury  Description: Perform a screening for skin injury risk, such as pressure or moisture-associated skin damage on admission and at regular intervals throughout hospital stay.Keep all areas of skin (especially folds) clean and dry.Maintain adequate skin hydration.Relieve and redistribute pressure and protect bony prominences and skin at risk for injury; implement measures based on patient-specific risk factors.Match turning and repositioning schedule to clinical condition.Encourage weight shift frequently; assist with reposition if unable to complete independently.Float heels off bed; avoid pressure on the Achilles tendon.Keep skin free from extended contact with medical devices.Optimize nutrition and hydration.Encourage functional activity and mobility, as early as tolerated.Use aids (e.g., slide boards, mechanical lift) during transfer.  Recent Flowsheet Documentation  Taken 5/31/2025 1400 by Jessika Cristina, RN  Body Position: weight shifting  Taken 5/31/2025 1200 by Jessika Cristina RN  Body Position: weight shifting  Taken 5/31/2025 0800 by Jessika Cristina RN  Body Position: weight shifting  Intervention: Prevent Infection  Description: Maintain skin and mucous membrane integrity; promote hand, oral and pulmonary hygiene.Optimize fluid balance, nutrition, sleep and glycemic control to maximize infection resistance.Identify potential sources of infection early to prevent or mitigate progression of infection (e.g., wound, lines, devices).Evaluate ongoing need for invasive devices; remove promptly when no longer indicated.Review  vaccination status.  Recent Flowsheet Documentation  Taken 5/31/2025 1600 by Jessika Cristina RN  Infection Prevention: hand hygiene promoted  Taken 5/31/2025 1400 by Jessika Cristina RN  Infection Prevention: hand hygiene promoted  Taken 5/31/2025 1200 by Jessika Cristina RN  Infection Prevention: hand hygiene promoted  Taken 5/31/2025 1000 by Jessika Cristina RN  Infection Prevention: hand hygiene promoted  Taken 5/31/2025 0800 by Jessika Cristina RN  Infection Prevention: hand hygiene promoted  Goal: Optimal Comfort and Wellbeing  Outcome: Progressing  Goal: Readiness for Transition of Care  Outcome: Progressing     Problem: Violence Risk or Actual  Goal: Anger and Impulse Control  Outcome: Progressing  Intervention: Minimize Safety Risk  Description: Listen actively, observing verbal and nonverbal cues (e.g., irritability, confusion, lack of cooperation, demanding behavior, body posture, expression); take threats seriously.Maintain a therapeutic presence; utilize calm, empathetic tone of voice, nonjudgmental attitude and nonthreatening body language; listen carefully.Assess and provide for unmet needs, including nutrition, comfort, hydration, hygiene, companionship and appropriate rest.Utilize empathetic but firm and concise communication; set limits, offer choices and propose alternatives.Remove stimuli and objects that may lead to harming self or others.Maintain clear path to room exit; keep door open during care.Ask directly about homicidal and suicidal intent; provide additional safety measures based on level of risk (e.g., one-on-one observation, duty to warn).Implement least restrictive measures if attempts to de-escalate violent or injurious behaviors are unsuccessful.  Recent Flowsheet Documentation  Taken 5/31/2025 1600 by Jessika Cristina RN  Enhanced Safety Measures: bed alarm set  Taken 5/31/2025 1400 by Jessika Cristina RN  Enhanced Safety Measures: bed alarm set  Taken 5/31/2025 1200 by Jonnie  DREW Rosen  Enhanced Safety Measures: bed alarm set  Taken 5/31/2025 1000 by Jessika Cristina RN  Enhanced Safety Measures: bed alarm set  Taken 5/31/2025 0800 by Jessika Cristina RN  Enhanced Safety Measures: bed alarm set     Problem: Fall Injury Risk  Goal: Absence of Fall and Fall-Related Injury  Outcome: Progressing  Intervention: Promote Injury-Free Environment  Description: Provide a safe, barrier-free environment that encourages independent activity.Keep care area uncluttered and well-lighted.Determine need for increased observation or monitoring.Avoid use of devices that minimize mobility, such as restraints or indwelling urinary catheter.  Recent Flowsheet Documentation  Taken 5/31/2025 1600 by Jessika Cristina RN  Safety Promotion/Fall Prevention:   safety round/check completed   fall prevention program maintained   clutter free environment maintained   nonskid shoes/slippers when out of bed  Taken 5/31/2025 1400 by Jessika Cristina RN  Safety Promotion/Fall Prevention:   safety round/check completed   fall prevention program maintained   clutter free environment maintained   nonskid shoes/slippers when out of bed  Taken 5/31/2025 1200 by Jessika Cristina RN  Safety Promotion/Fall Prevention:   safety round/check completed   fall prevention program maintained   clutter free environment maintained   nonskid shoes/slippers when out of bed  Taken 5/31/2025 1000 by Jessika Cristina RN  Safety Promotion/Fall Prevention:   safety round/check completed   fall prevention program maintained   clutter free environment maintained   nonskid shoes/slippers when out of bed  Taken 5/31/2025 0800 by Jessika Cristina RN  Safety Promotion/Fall Prevention:   safety round/check completed   fall prevention program maintained   clutter free environment maintained   nonskid shoes/slippers when out of bed     Problem: UTI (Urinary Tract Infection)  Goal: Improved Infection Symptoms  Outcome: Progressing     Problem: Dementia  Signs/Symptoms  Goal: Improved Behavioral Control (Dementia Signs/Symptoms)  Outcome: Progressing  Goal: Improved Mood Symptoms (Dementia Signs/Symptoms)  Outcome: Progressing  Goal: Optimized Cognitive Function (Dementia Signs/Symptoms)  Outcome: Progressing  Goal: Optimized Oral Intake (Dementia Signs/Symptoms)  Outcome: Progressing  Goal: Improved Sleep (Dementia Signs/Symptoms)  Outcome: Progressing  Goal: Enhanced Social or Functional Skills and Ability (Dementia Signs/Symptoms)  Outcome: Progressing     Problem: Skin Injury Risk Increased  Goal: Skin Health and Integrity  Outcome: Progressing  Intervention: Optimize Skin Protection  Description: Perform a full pressure injury risk assessment, as indicated by screening, upon admission to care unit.Reassess skin (full inspection and injury risk, including skin temperature, consistency and color) frequently (e.g., scheduled interval, with change in condition) to provide optimal early detection and prevention.Maintain adequate tissue perfusion (e.g., encourage fluid balance; avoid crossing legs, constrictive clothing or devices) to promote tissue oxygenation.Maintain head of bed at lowest degree of elevation tolerated, considering medical condition and other restrictions. Use positioning supports to prevent sliding and friction. Consider low friction textiles.Avoid positioning onto an area that remains reddened or on bony prominences.Minimize incontinence and moisture (e.g., toileting schedule; moisture-wicking pad, diaper or incontinence collection device; skin moisture barrier).Cleanse skin promptly and gently, when soiled, utilizing a pH-balanced cleanser.Relieve and redistribute pressure (e.g., scheduled position changes, weight shifts, use of support surface, medical device repositioning, protective dressing application, use of positioning device, microclimate control, use of pressure-injury-monitorEncourage increased activity, such as sitting in a chair at  the bedside or early mobilization, when able to tolerate. Avoid prolonged sitting.  Recent Flowsheet Documentation  Taken 5/31/2025 1600 by Jessika Cristina, RN  Activity Management: activity encouraged  Head of Bed (HOB) Positioning: HOB elevated  Taken 5/31/2025 1400 by Jessika Cristina, RN  Activity Management: activity encouraged  Head of Bed (HOB) Positioning: HOB elevated  Taken 5/31/2025 1200 by Jessika Cristina, RN  Activity Management: activity encouraged  Head of Bed (HOB) Positioning: HOB elevated  Taken 5/31/2025 1000 by Jessika Cristina, RN  Activity Management: activity encouraged  Head of Bed (HOB) Positioning: HOB elevated  Taken 5/31/2025 0800 by Jessika Cristina, RN  Activity Management: activity encouraged  Head of Bed (HOB) Positioning: HOB elevated

## 2025-05-31 NOTE — PROGRESS NOTES
"DOS: 2025  NAME: Cassie Vasquez Jason   : 1955  PCP: Mariano Gordon DO  Chief Complaint   Patient presents with    Seizures       Chief complaint: Altered mental status, urosepsis, new seizure-like activity  Subjective: Patient seen and examined at the bedside via teleneurology video call with the help of the rounding nurse.  No events overnight, no more seizure activity.    Objective:  Vital signs: /67 (BP Location: Left arm, Patient Position: Lying)   Pulse 87   Temp 98.8 °F (37.1 °C) (Axillary)   Resp 16   Ht 172.7 cm (68\")   Wt 79.9 kg (176 lb 2.4 oz)   SpO2 98%   BMI 26.78 kg/m²    Gen: Sleeping, vitals reviewed  MS: oriented x0 which is her baseline, did not follow any commands, poor attention/concentration, unable to assess language due to patient condition, unable to assess neglect.  CN: Resists eye opening, no obvious facial droop to supraorbital pain, unable to assess dysarthria  Motor: Spontaneously moving all extremities  Sensory exam: Withdrew to pain throughout    Laboratory results:  Lab Results   Component Value Date    GLUCOSE 123 (H) 2025    CALCIUM 9.2 2025     2025    K 4.1 2025    CO2 20.6 (L) 2025     (H) 2025    BUN 11.0 2025    CREATININE 1.22 (H) 2025    EGFRIFAFRI 77 2020    EGFRIFNONA 63 2020    BCR 9.0 2025    ANIONGAP 11.4 2025     Lab Results   Component Value Date    WBC 9.39 2025    HGB 14.2 2025    HCT 44.8 2025    MCV 87.3 2025     2025     Lab Results   Component Value Date    LDL 63 2020    LDL 70 2020    LDL 59 2019          Review of labs: Ionized calcium normal, magnesium normal, phosphorus slightly low at 2.3, urinary tract infection, glucose 174, BUN 17, creatinine 1.3, WBC 19.2, magnesium 2.2, TSH 3.72, procalcitonin negative, respiratory panel negative, lactate was elevated on admission at 2.2 improved to 1.3 the " next day.     Review and interpretation of imaging: I personally reviewed the MRI brain with and without contrast and agreed with radiology report.  No acute findings.  Dilated ventricles felt likely from central atrophy, hydrocephalus and transependymal flow of cerebrospinal fluid felt less likely     I personally reviewed her CT scan of the head dated 5/29 read by radiologist as possible normal pressure hydrocephalus compared to prior CT head in 2020. I do not think this is a normal pressure hydrocephalus and I believe this is progression of her brain atrophy causing ex-vacuo brain dilatation as her prior CT head in 2020 also showed brain atrophy and ventricular dilatation although was to a lesser extent.      Diagnoses:  - New onset seizure-like activity likely provoked secondary to urinary tract infection and metabolic abnormalities  - Advanced Alzheimer dementia  - Abnormal MRI brain with advanced brain atrophy and dilated ventricles likely ex-vacuo dilatation from central volume loss, hydrocephalus and transependymal flow of cerebrospinal fluid felt less likely.  - Urinary tract infection with severe sepsis  - Delirium  - Altered mental status secondary to the above        PLAN:   - Routine EEG pending, per nursing staff could not be done today or over the weekend and only available on Tuesdays and Thursdays.  In this case this can be arranged as an outpatient.  - Hold on antiseizure medications unless EEG showing abnormal discharges  - Use Ativan 2 mg IV for tonic-clonic seizures lasting more than 3 minutes  - Seizure precautions as detailed below  - Treatment of UTI and metabolic abnormalities as per the primary  - Follow delirium precautions as detailed below        Discussed with Dr. Hernandez via epic chat.  Discussed with nursing staff to follow delirium precautions below.    Delirium precautions:    1. Frequent reorientation, reminding patient not questioning patient   2. Shades up during day/down at night    3. TV off except for soft music only   4. Familiar objects at bedside   5. Encourage friends/family to spend the night   6. Minimize anticholingeric medications   7. Minimize polypharmacy   8. Minimize restraints, includes lines and/or foleys and/or feeding tubes as able   9. Minimize overnight checks/vitals to encourage restful consistent sleep patterns   10. Out of bed during day as much as possible        Seizure Precautions:  ·    Do not drive 90 days. (this is to include: car, bicycle, or motorcycle)  ·    Do not drive operate dangerous equipment such as power tools, heavy machinery with moving parts, or an open flame.  ·    Do not swim alone (this would include a bathtub full of water is a small swimming pool).  ·    Avoid unsecured heights. (this is to include: scaffolding, ladders, trees, and roofs).     This was an audio and video visit enabled telemedicine encounter.     The consult was conducted in real time using interactive audio and video technology. Patient/Family was informed of the technology being used for this visit and agreed proceed. Patient located in hospital at [facility] and I'm the provider located at home/office based setting in [place], [place].

## 2025-05-31 NOTE — PROGRESS NOTES
[facility]    Name:  Cassie Vasquez Jason   Age:  70 y.o.  Sex:  female  :  1955  MRN:  [id number]   Visit Number:  [id number]  Admission Date:  2025  Date Of Service:  25  Primary Care Physician:  Mariano Gordon DO     LOS: 2 days :    Chief Complaint:      Seizure-like activity     Subjective:    No acute events overnight  No weakness seizure activities    Per neurology, MRI showed no acute findings. Her  symptoms are more likely related to delirium secondary to UTI, delirium precautions initiated.    Hospital Course:    Patient is 70 years old female with a past medical history of Alzheimer disease, depression, diabetes mellitus, hypertension, hyperlipidemia, hypothyroidism, presented to the ER from the nursing home with a chief complaint of seizure-like activity.  Patient was eating breakfast  prior to arrival and she suddenly started having convulsions that lasted around 3 minutes.  Witnessed per nursing home staff. History otherwise limited with her history of Alzheimer dementia.  All history was obtained per ER and nursing home report.  Patient appears alert but keeping her eyes closed and nonverbal upon exam.  Per son, patient has severe Alzheimer's disease, is not oriented to self.  Does require a feeder, however is mostly alert.  No history of seizure disorder.     On ER evaluation, patient was tachycardic on arrival with a heart rate of 122 otherwise afebrile with stable blood pressure.  Workup in the ER was significant for HS Troponin of 28-36 with delta change of 8, creatinine 1.3, glucose 172, lactate 2.2, WBC 19.2.  Urinalysis consistent with infection.  Chest x-ray with no acute process.  Respiratory panel negative.  CT head without contrast showed Interval increase in size of ventricles which appears disproportionate to the sulci, normal pressure hydrocephalus not excluded.  Teleneurology Dr. Macias consulted and felt that her CT  findings more consistent with brain atrophy, recommended admission for MRI and EEG.  Patient received Ativan 1 mg, total of 3.3 L of normal saline boluses and was started on Rocephin while in the ER.  Hospitalist consulted for admission, further management and treatment.  MRI showed no acute findings, EEG can be done outpatient per neurology.    Review of Systems:     All systems were reviewed and negative except as mentioned in subjective, assessment and plan.    Vital Signs:    Temp:  [97.6 °F (36.4 °C)-98.9 °F (37.2 °C)] 98.8 °F (37.1 °C)  Resp:  [16-18] 16  BP: ()/(66-84) 144/67    Intake and output:    I/O last 3 completed shifts:  In: 240 [P.O.:240]  Out: -   I/O this shift:  In: 60 [P.O.:60]  Out: -     Physical Examination:    General Appearance:  Alert and cooperative.    Head:  Atraumatic and normocephalic.   Eyes: Conjunctivae and sclerae normal, no icterus. No pallor.   Throat: No oral lesions, no thrush, oral mucosa moist.   Neck: Supple, trachea midline, no thyromegaly.   Lungs:   Breath sounds heard bilaterally equally.  No wheezing or crackles. No Pleural rub or bronchial breathing.   Heart:  Normal S1 and S2, no murmur, no gallop, no rub. No JVD.   Abdomen:   Normal bowel sounds, no masses, no organomegaly. Soft, nontender, nondistended, no rebound tenderness.   Extremities: Supple, no edema, no cyanosis, no clubbing.   Skin: No bleeding or rash.   Neurologic: Alert and oriented x 3. No facial asymmetry. Moves all four limbs. No tremors.      Laboratory results:    Results from last 7 days   Lab Units 05/31/25  0718 05/30/25  0620 05/29/25  0835   SODIUM mmol/L 142 140 138   POTASSIUM mmol/L 4.1 4.1 4.6   CHLORIDE mmol/L 111* 108* 101   CO2 mmol/L 17.6* 20.6* 23.1   BUN mg/dL 8.0 11.0 17.0   CREATININE mg/dL 1.09* 1.22* 1.33*   CALCIUM mg/dL 9.2 9.2 10.0   BILIRUBIN mg/dL  --   --  0.4   ALK PHOS U/L  --   --  107   ALT (SGPT) U/L  --   --  7   AST (SGOT) U/L  --   --  18   GLUCOSE mg/dL 110*  "123* 174*     Results from last 7 days   Lab Units 05/31/25  0718 05/30/25  0620 05/29/25  0835   WBC 10*3/mm3 8.90 9.39 19.22*   HEMOGLOBIN g/dL 12.3 14.2 14.7   HEMATOCRIT % 39.3 44.8 47.1*   PLATELETS 10*3/mm3 266 205 340         Results from last 7 days   Lab Units 05/30/25  0620 05/29/25  1455 05/29/25  1008   HSTROP T ng/L 29* 36* 36*     Results from last 7 days   Lab Units 05/29/25  1027   BLOODCX  No growth at 2 days  No growth at 2 days     No results for input(s): \"PHART\", \"OIE7OFA\", \"PO2ART\", \"GEO4CVF\", \"BASEEXCESS\" in the last 8760 hours.   I have reviewed the patient's laboratory results.    Radiology results:    MRI Brain With & Without Contrast  Result Date: 5/30/2025  FINAL REPORT TECHNIQUE: null CLINICAL HISTORY: AMS BEST IMAGES POSSIBLE AT THIS TIME. AMS REPORTED SEIZURE SEVERAL DAYS PRIOR. COMPARISON: null FINDINGS: MR of the brain with and without contrast Comparison: None Findings: Image quality is degraded by patient motion. No acute infarction, hemorrhage, mass-effect or herniation. No abnormal enhancement. The lateral and 3rd ventricles are enlarged. There is also prominence of the extra-axial spaces due to atrophy of the brain parenchyma. Increased signal intensity is seen in the deep and periventricular white matter on the T2/FLAIR sequences, which most likely represents the sequela of chronic small vessel ischemic disease. Transependymal flow of cerebral spinal fluid is considered less likely. No extra-axial fluid collection or mass. Unremarkable sella. Intact flow voids. Normal orbits. Mucosal thickening in the right alveolar recess. Otherwise clear paranasal sinuses and mastoid air cells. Unremarkable osseous structures.     Impression: Impression: No acute findings. Enlarged lateral and 3rd ventricles could be secondary to mild hydrocephalus and/or parenchymal atrophy. Increased signal intensity is seen in the deep and periventricular white matter on the T2/FLAIR sequences, which most " likely represents the sequela of moderate chronic small vessel ischemic disease. Authenticated and Electronically Signed by Krysten Rogel MD on 05/30/2025 05:29:08 PM    I have reviewed the patient's radiology reports.    Medication Review:     I have reviewed the patient's active and prn medications.     Problem List:      Seizure disorder      Assessment:    Seizure-like activity, POA  Acute UTI, POA  Sepsis ruled out  Elevated troponin, likely demand ischemia, POA  Advanced Alzheimer dementia  Depression  Diabetes mellitus  Hypertension  Hyperlipidemia  Hypothyroidism    Plan:    Seizure-like activity, seizure ruled out  - Neurology consulted :  impression is that her symptoms are likely infectious related  -MRI with no acute findings.  EEG to be done OP  Order neurology recommendations as below  - Hold on antiseizure medications unless EEG showing abnormal discharges  - Use Ativan 2 mg IV for tonic-clonic seizures lasting more than 3 minutes  - Delirium precautions   - Continue treatments of cystitis     Acute complicated cystitis  - Continue IV Rocephin  - Urine culture pending       Alzheimer dementia  Hypertension  Hyperlipidemia  -Continue home meds   - Blood pressure improved, continue to hold home antihypertensives for now until at least 24 to 48 hours stable blood pressure.       DVT Prophylaxis: Heparin  Code Status: DNI/DNR  Diet: Puréed nectar thickened liquids.  Discharge Plan: Okay to discharge back to her living facility after the weekend.    Min Hernandez MD  05/31/25  12:19 EDT    Dictated utilizing Dragon dictation.

## 2025-06-01 LAB
ANION GAP SERPL CALCULATED.3IONS-SCNC: 9.1 MMOL/L (ref 5–15)
BUN SERPL-MCNC: 8 MG/DL (ref 8–23)
BUN/CREAT SERPL: 7.1 (ref 7–25)
CALCIUM SPEC-SCNC: 8.7 MG/DL (ref 8.6–10.5)
CHLORIDE SERPL-SCNC: 108 MMOL/L (ref 98–107)
CO2 SERPL-SCNC: 21.9 MMOL/L (ref 22–29)
CREAT SERPL-MCNC: 1.12 MG/DL (ref 0.57–1)
DEPRECATED RDW RBC AUTO: 44.7 FL (ref 37–54)
EGFRCR SERPLBLD CKD-EPI 2021: 52.7 ML/MIN/1.73
ERYTHROCYTE [DISTWIDTH] IN BLOOD BY AUTOMATED COUNT: 14.1 % (ref 12.3–15.4)
GLUCOSE SERPL-MCNC: 162 MG/DL (ref 65–99)
HCT VFR BLD AUTO: 37.8 % (ref 34–46.6)
HGB BLD-MCNC: 12.2 G/DL (ref 12–15.9)
MCH RBC QN AUTO: 28.4 PG (ref 26.6–33)
MCHC RBC AUTO-ENTMCNC: 32.3 G/DL (ref 31.5–35.7)
MCV RBC AUTO: 87.9 FL (ref 79–97)
PLATELET # BLD AUTO: 283 10*3/MM3 (ref 140–450)
PMV BLD AUTO: 10.2 FL (ref 6–12)
POTASSIUM SERPL-SCNC: 3.5 MMOL/L (ref 3.5–5.2)
POTASSIUM SERPL-SCNC: 3.8 MMOL/L (ref 3.5–5.2)
RBC # BLD AUTO: 4.3 10*6/MM3 (ref 3.77–5.28)
SODIUM SERPL-SCNC: 139 MMOL/L (ref 136–145)
WBC NRBC COR # BLD AUTO: 8.18 10*3/MM3 (ref 3.4–10.8)

## 2025-06-01 PROCEDURE — 99232 SBSQ HOSP IP/OBS MODERATE 35: CPT | Performed by: FAMILY MEDICINE

## 2025-06-01 PROCEDURE — 85027 COMPLETE CBC AUTOMATED: CPT | Performed by: FAMILY MEDICINE

## 2025-06-01 PROCEDURE — 84132 ASSAY OF SERUM POTASSIUM: CPT | Performed by: FAMILY MEDICINE

## 2025-06-01 PROCEDURE — 25010000002 CEFTRIAXONE PER 250 MG: Performed by: FAMILY MEDICINE

## 2025-06-01 PROCEDURE — 25010000002 HEPARIN (PORCINE) PER 1000 UNITS: Performed by: FAMILY MEDICINE

## 2025-06-01 PROCEDURE — 99233 SBSQ HOSP IP/OBS HIGH 50: CPT | Performed by: STUDENT IN AN ORGANIZED HEALTH CARE EDUCATION/TRAINING PROGRAM

## 2025-06-01 PROCEDURE — 80048 BASIC METABOLIC PNL TOTAL CA: CPT | Performed by: FAMILY MEDICINE

## 2025-06-01 RX ORDER — POTASSIUM CHLORIDE 1500 MG/1
40 TABLET, EXTENDED RELEASE ORAL EVERY 4 HOURS
Status: DISPENSED | OUTPATIENT
Start: 2025-06-01 | End: 2025-06-01

## 2025-06-01 RX ADMIN — POTASSIUM CHLORIDE 40 MEQ: 1500 TABLET, EXTENDED RELEASE ORAL at 13:03

## 2025-06-01 RX ADMIN — HEPARIN SODIUM 5000 UNITS: 5000 INJECTION INTRAVENOUS; SUBCUTANEOUS at 09:23

## 2025-06-01 RX ADMIN — MEMANTINE 10 MG: 5 TABLET ORAL at 20:46

## 2025-06-01 RX ADMIN — SERTRALINE 50 MG: 50 TABLET, FILM COATED ORAL at 09:23

## 2025-06-01 RX ADMIN — ATORVASTATIN CALCIUM 10 MG: 10 TABLET, FILM COATED ORAL at 09:23

## 2025-06-01 RX ADMIN — SODIUM CHLORIDE 1000 MG: 9 INJECTION, SOLUTION INTRAVENOUS at 09:37

## 2025-06-01 RX ADMIN — HEPARIN SODIUM 5000 UNITS: 5000 INJECTION INTRAVENOUS; SUBCUTANEOUS at 20:46

## 2025-06-01 RX ADMIN — RIVASTIGMINE 1 PATCH: 13.3 PATCH TRANSDERMAL at 11:32

## 2025-06-01 RX ADMIN — Medication 10 ML: at 20:46

## 2025-06-01 RX ADMIN — QUETIAPINE FUMARATE 50 MG: 25 TABLET ORAL at 20:46

## 2025-06-01 RX ADMIN — Medication 10 ML: at 09:23

## 2025-06-01 RX ADMIN — LEVOTHYROXINE SODIUM 50 MCG: 0.05 TABLET ORAL at 09:23

## 2025-06-01 RX ADMIN — MEMANTINE 10 MG: 5 TABLET ORAL at 09:23

## 2025-06-01 NOTE — PLAN OF CARE
Goal Outcome Evaluation:      Ongoing, progressing.      Problem: Adult Inpatient Plan of Care  Goal: Plan of Care Review  Outcome: Progressing  Goal: Patient-Specific Goal (Individualized)  Outcome: Progressing  Goal: Absence of Hospital-Acquired Illness or Injury  Outcome: Progressing  Intervention: Identify and Manage Fall Risk  Description: Perform standard risk assessment on admission using a validated tool or comprehensive approach appropriate to the patient; reassess fall risk frequently, with change in status or transfer to another level of care.Communicate risk to interprofessional healthcare team; ensure fall risk visible cue.Determine need for increased observation, equipment and environmental modification, as well as use of supportive, nonskid footwear.Adjust safety measures to individual needs and identified risk factors.Reinforce the importance of active participation with fall risk prevention, safety, and physical activity with the patient and family.Perform regular intentional rounding to assess need for position change, pain assessment and personal needs, including assistance with toileting.  Recent Flowsheet Documentation  Taken 6/1/2025 1600 by Jessika Cristina, RN  Safety Promotion/Fall Prevention:   safety round/check completed   fall prevention program maintained   clutter free environment maintained   nonskid shoes/slippers when out of bed  Taken 6/1/2025 1550 by Jessika Cristina, RN  Safety Promotion/Fall Prevention:   safety round/check completed   fall prevention program maintained   clutter free environment maintained   nonskid shoes/slippers when out of bed  Taken 6/1/2025 1400 by Jessika Cristina, RN  Safety Promotion/Fall Prevention:   safety round/check completed   fall prevention program maintained   clutter free environment maintained   nonskid shoes/slippers when out of bed  Taken 6/1/2025 1359 by Jessika Cristina, RN  Safety Promotion/Fall Prevention:   safety round/check completed    fall prevention program maintained   clutter free environment maintained   nonskid shoes/slippers when out of bed  Taken 6/1/2025 1007 by Jessika Cristina RN  Safety Promotion/Fall Prevention:   safety round/check completed   fall prevention program maintained   clutter free environment maintained   nonskid shoes/slippers when out of bed  Taken 6/1/2025 0800 by Jessika Cristina RN  Safety Promotion/Fall Prevention:   safety round/check completed   fall prevention program maintained   clutter free environment maintained   nonskid shoes/slippers when out of bed  Intervention: Prevent Skin Injury  Description: Perform a screening for skin injury risk, such as pressure or moisture-associated skin damage on admission and at regular intervals throughout hospital stay.Keep all areas of skin (especially folds) clean and dry.Maintain adequate skin hydration.Relieve and redistribute pressure and protect bony prominences and skin at risk for injury; implement measures based on patient-specific risk factors.Match turning and repositioning schedule to clinical condition.Encourage weight shift frequently; assist with reposition if unable to complete independently.Float heels off bed; avoid pressure on the Achilles tendon.Keep skin free from extended contact with medical devices.Optimize nutrition and hydration.Encourage functional activity and mobility, as early as tolerated.Use aids (e.g., slide boards, mechanical lift) during transfer.  Recent Flowsheet Documentation  Taken 6/1/2025 1600 by Jessika Cristina RN  Body Position: weight shifting  Taken 6/1/2025 1550 by Jessika Cristina RN  Body Position: weight shifting  Taken 6/1/2025 1007 by Jessika Cristina RN  Body Position: weight shifting  Taken 6/1/2025 0800 by Jessika Cristina RN  Body Position: weight shifting  Intervention: Prevent Infection  Description: Maintain skin and mucous membrane integrity; promote hand, oral and pulmonary hygiene.Optimize fluid balance,  nutrition, sleep and glycemic control to maximize infection resistance.Identify potential sources of infection early to prevent or mitigate progression of infection (e.g., wound, lines, devices).Evaluate ongoing need for invasive devices; remove promptly when no longer indicated.Review vaccination status.  Recent Flowsheet Documentation  Taken 6/1/2025 1600 by Jessika Cristina RN  Infection Prevention: hand hygiene promoted  Taken 6/1/2025 1550 by Jessika Cristina RN  Infection Prevention: hand hygiene promoted  Taken 6/1/2025 1400 by Jessika Cristina RN  Infection Prevention: hand hygiene promoted  Taken 6/1/2025 1359 by Jessika Cristina RN  Infection Prevention: hand hygiene promoted  Taken 6/1/2025 1007 by Jessika Cristina RN  Infection Prevention: hand hygiene promoted  Taken 6/1/2025 0800 by Jessika Cristina RN  Infection Prevention: hand hygiene promoted  Goal: Optimal Comfort and Wellbeing  Outcome: Progressing  Goal: Readiness for Transition of Care  Outcome: Progressing     Problem: Violence Risk or Actual  Goal: Anger and Impulse Control  Outcome: Progressing  Intervention: Minimize Safety Risk  Description: Listen actively, observing verbal and nonverbal cues (e.g., irritability, confusion, lack of cooperation, demanding behavior, body posture, expression); take threats seriously.Maintain a therapeutic presence; utilize calm, empathetic tone of voice, nonjudgmental attitude and nonthreatening body language; listen carefully.Assess and provide for unmet needs, including nutrition, comfort, hydration, hygiene, companionship and appropriate rest.Utilize empathetic but firm and concise communication; set limits, offer choices and propose alternatives.Remove stimuli and objects that may lead to harming self or others.Maintain clear path to room exit; keep door open during care.Ask directly about homicidal and suicidal intent; provide additional safety measures based on level of risk (e.g., one-on-one  observation, duty to warn).Implement least restrictive measures if attempts to de-escalate violent or injurious behaviors are unsuccessful.  Recent Flowsheet Documentation  Taken 6/1/2025 1600 by Jessika Cristina RN  Enhanced Safety Measures: bed alarm set  Taken 6/1/2025 1550 by Jessika Cristina RN  Enhanced Safety Measures: bed alarm set  Taken 6/1/2025 1400 by Jessika Cristina RN  Enhanced Safety Measures: bed alarm set  Taken 6/1/2025 1359 by Jessika Cristina RN  Enhanced Safety Measures: bed alarm set  Taken 6/1/2025 1007 by Jessika Cristina RN  Enhanced Safety Measures: bed alarm set  Taken 6/1/2025 0800 by Jessika Cristina RN  Enhanced Safety Measures: bed alarm set     Problem: Fall Injury Risk  Goal: Absence of Fall and Fall-Related Injury  Outcome: Progressing  Intervention: Promote Injury-Free Environment  Description: Provide a safe, barrier-free environment that encourages independent activity.Keep care area uncluttered and well-lighted.Determine need for increased observation or monitoring.Avoid use of devices that minimize mobility, such as restraints or indwelling urinary catheter.  Recent Flowsheet Documentation  Taken 6/1/2025 1600 by Jessika Cristina RN  Safety Promotion/Fall Prevention:   safety round/check completed   fall prevention program maintained   clutter free environment maintained   nonskid shoes/slippers when out of bed  Taken 6/1/2025 1550 by Jessika Cristina RN  Safety Promotion/Fall Prevention:   safety round/check completed   fall prevention program maintained   clutter free environment maintained   nonskid shoes/slippers when out of bed  Taken 6/1/2025 1400 by Jessika Cristina RN  Safety Promotion/Fall Prevention:   safety round/check completed   fall prevention program maintained   clutter free environment maintained   nonskid shoes/slippers when out of bed  Taken 6/1/2025 1359 by Jessika Cristina RN  Safety Promotion/Fall Prevention:   safety round/check completed   fall  prevention program maintained   clutter free environment maintained   nonskid shoes/slippers when out of bed  Taken 6/1/2025 1007 by Jessika Cristina RN  Safety Promotion/Fall Prevention:   safety round/check completed   fall prevention program maintained   clutter free environment maintained   nonskid shoes/slippers when out of bed  Taken 6/1/2025 0800 by Jessika Cristina, RN  Safety Promotion/Fall Prevention:   safety round/check completed   fall prevention program maintained   clutter free environment maintained   nonskid shoes/slippers when out of bed     Problem: UTI (Urinary Tract Infection)  Goal: Improved Infection Symptoms  Outcome: Progressing     Problem: Dementia Signs/Symptoms  Goal: Improved Behavioral Control (Dementia Signs/Symptoms)  Outcome: Progressing  Goal: Improved Mood Symptoms (Dementia Signs/Symptoms)  Outcome: Progressing  Goal: Optimized Cognitive Function (Dementia Signs/Symptoms)  Outcome: Progressing  Goal: Optimized Oral Intake (Dementia Signs/Symptoms)  Outcome: Progressing  Goal: Improved Sleep (Dementia Signs/Symptoms)  Outcome: Progressing  Goal: Enhanced Social or Functional Skills and Ability (Dementia Signs/Symptoms)  Outcome: Progressing     Problem: Skin Injury Risk Increased  Goal: Skin Health and Integrity  Outcome: Progressing  Intervention: Optimize Skin Protection  Description: Perform a full pressure injury risk assessment, as indicated by screening, upon admission to care unit.Reassess skin (full inspection and injury risk, including skin temperature, consistency and color) frequently (e.g., scheduled interval, with change in condition) to provide optimal early detection and prevention.Maintain adequate tissue perfusion (e.g., encourage fluid balance; avoid crossing legs, constrictive clothing or devices) to promote tissue oxygenation.Maintain head of bed at lowest degree of elevation tolerated, considering medical condition and other restrictions. Use positioning  supports to prevent sliding and friction. Consider low friction textiles.Avoid positioning onto an area that remains reddened or on bony prominences.Minimize incontinence and moisture (e.g., toileting schedule; moisture-wicking pad, diaper or incontinence collection device; skin moisture barrier).Cleanse skin promptly and gently, when soiled, utilizing a pH-balanced cleanser.Relieve and redistribute pressure (e.g., scheduled position changes, weight shifts, use of support surface, medical device repositioning, protective dressing application, use of positioning device, microclimate control, use of pressure-injury-monitorEncourage increased activity, such as sitting in a chair at the bedside or early mobilization, when able to tolerate. Avoid prolonged sitting.  Recent Flowsheet Documentation  Taken 6/1/2025 1600 by Jessika Cristina RN  Activity Management: activity encouraged  Head of Bed (HOB) Positioning: HOB elevated  Taken 6/1/2025 1550 by Jessika Cristina RN  Activity Management: activity encouraged  Head of Bed (HOB) Positioning: HOB elevated  Taken 6/1/2025 1400 by Jessika Cristina RN  Activity Management: activity encouraged  Head of Bed (HOB) Positioning: HOB elevated  Taken 6/1/2025 1359 by Jessika Cristina RN  Activity Management: activity encouraged  Head of Bed (HOB) Positioning: HOB elevated  Taken 6/1/2025 1007 by Jessika Cristina RN  Activity Management: activity encouraged  Head of Bed (HOB) Positioning: HOB elevated  Taken 6/1/2025 0800 by Jessika Cristina, RN  Activity Management: activity encouraged  Head of Bed (HOB) Positioning: HOB elevated

## 2025-06-01 NOTE — PROGRESS NOTES
[facility]    Name:  Cassie Carrn   Age:  70 y.o.  Sex:  female  :  1955  MRN:  [id number]   Visit Number:  [id number]  Admission Date:  2025  Date Of Service:  25  Primary Care Physician:  Mariano Gordon DO     LOS: 3 days :    Chief Complaint:      Seizure-like activity     Subjective:    Patient was seen and examined bedside today.  Patient resting comfortably in bed with no distress.  Patient appears asleep with her eyes closed, was easily arousable, would wake up to verbal stimuli but remains nonverbal with Alzheimer disease.  No acute events overnight. Vital stable, afebrile.    Hospital Course:    Patient is 70 years old female with a past medical history of Alzheimer disease, depression, diabetes mellitus, hypertension, hyperlipidemia, hypothyroidism, presented to the ER from the nursing home with a chief complaint of seizure-like activity.  Patient was eating breakfast  prior to arrival and she suddenly started having convulsions that lasted around 3 minutes.  Witnessed per nursing home staff. History otherwise limited with her history of Alzheimer dementia.  All history was obtained per ER and nursing home report.  Patient appears alert but keeping her eyes closed and nonverbal upon exam.  Per son, patient has severe Alzheimer's disease, is not oriented to self.  Does require a feeder, however is mostly alert.  No history of seizure disorder.     On ER evaluation, patient was tachycardic on arrival with a heart rate of 122 otherwise afebrile with stable blood pressure.  Workup in the ER was significant for HS Troponin of 28-36 with delta change of 8, creatinine 1.3, glucose 172, lactate 2.2, WBC 19.2.  Urinalysis consistent with infection.  Chest x-ray with no acute process.  Respiratory panel negative.  CT head without contrast showed Interval increase in size of ventricles which appears disproportionate to the sulci, normal pressure  hydrocephalus not excluded.  Teleneurology Dr. Macias consulted and felt that her CT findings more consistent with brain atrophy, recommended admission for MRI and EEG.  Patient received Ativan 1 mg, total of 3.3 L of normal saline boluses and was started on Rocephin while in the ER.  Hospitalist consulted for admission, further management and treatment.  MRI showed no acute findings, EEG can be done outpatient per neurology.    Review of Systems:     All systems were reviewed and negative except as mentioned in subjective, assessment and plan.    Vital Signs:    Temp:  [97.8 °F (36.6 °C)-98.4 °F (36.9 °C)] 97.9 °F (36.6 °C)  Resp:  [16-18] 16  BP: (147-166)/(60-74) 166/74    Intake and output:    I/O last 3 completed shifts:  In: 180 [P.O.:180]  Out: -   I/O this shift:  In: 120 [P.O.:120]  Out: -     Physical Examination:    General Appearance:  Alert and cooperative.    Head:  Atraumatic and normocephalic.   Eyes: Conjunctivae and sclerae normal, no icterus. No pallor.   Throat: No oral lesions, no thrush, oral mucosa moist.   Neck: Supple, trachea midline, no thyromegaly.   Lungs:   Breath sounds heard bilaterally equally.  No wheezing or crackles. No Pleural rub or bronchial breathing.   Heart:  Normal S1 and S2, no murmur, no gallop, no rub. No JVD.   Abdomen:   Normal bowel sounds, no masses, no organomegaly. Soft, nontender, nondistended, no rebound tenderness.   Extremities: Supple, no edema, no cyanosis, no clubbing.   Skin: No bleeding or rash.   Neurologic: Alert and awake, demented.  No facial asymmetry. Moves all four limbs. No tremors.  Generalized weakness noted.     Laboratory results:    Results from last 7 days   Lab Units 05/31/25  0718 05/30/25  0620 05/29/25  0835   SODIUM mmol/L 142 140 138   POTASSIUM mmol/L 4.1 4.1 4.6   CHLORIDE mmol/L 111* 108* 101   CO2 mmol/L 17.6* 20.6* 23.1   BUN mg/dL 8.0 11.0 17.0   CREATININE mg/dL 1.09* 1.22* 1.33*   CALCIUM mg/dL 9.2 9.2 10.0   BILIRUBIN mg/dL   "--   --  0.4   ALK PHOS U/L  --   --  107   ALT (SGPT) U/L  --   --  7   AST (SGOT) U/L  --   --  18   GLUCOSE mg/dL 110* 123* 174*     Results from last 7 days   Lab Units 05/31/25  0718 05/30/25  0620 05/29/25  0835   WBC 10*3/mm3 8.90 9.39 19.22*   HEMOGLOBIN g/dL 12.3 14.2 14.7   HEMATOCRIT % 39.3 44.8 47.1*   PLATELETS 10*3/mm3 266 205 340         Results from last 7 days   Lab Units 05/30/25  0620 05/29/25  1455 05/29/25  1008   HSTROP T ng/L 29* 36* 36*     Results from last 7 days   Lab Units 05/29/25  1027 05/29/25  0922   BLOODCX  No growth at 2 days  No growth at 2 days  --    URINECX   --  >100,000 CFU/mL Gram Negative Bacilli*     No results for input(s): \"PHART\", \"JQI1DZK\", \"PO2ART\", \"HZN1LNM\", \"BASEEXCESS\" in the last 8760 hours.   I have reviewed the patient's laboratory results.    Radiology results:    MRI Brain With & Without Contrast  Result Date: 5/30/2025  FINAL REPORT TECHNIQUE: null CLINICAL HISTORY: AMS BEST IMAGES POSSIBLE AT THIS TIME. AMS REPORTED SEIZURE SEVERAL DAYS PRIOR. COMPARISON: null FINDINGS: MR of the brain with and without contrast Comparison: None Findings: Image quality is degraded by patient motion. No acute infarction, hemorrhage, mass-effect or herniation. No abnormal enhancement. The lateral and 3rd ventricles are enlarged. There is also prominence of the extra-axial spaces due to atrophy of the brain parenchyma. Increased signal intensity is seen in the deep and periventricular white matter on the T2/FLAIR sequences, which most likely represents the sequela of chronic small vessel ischemic disease. Transependymal flow of cerebral spinal fluid is considered less likely. No extra-axial fluid collection or mass. Unremarkable sella. Intact flow voids. Normal orbits. Mucosal thickening in the right alveolar recess. Otherwise clear paranasal sinuses and mastoid air cells. Unremarkable osseous structures.     Impression: Impression: No acute findings. Enlarged lateral and 3rd " ventricles could be secondary to mild hydrocephalus and/or parenchymal atrophy. Increased signal intensity is seen in the deep and periventricular white matter on the T2/FLAIR sequences, which most likely represents the sequela of moderate chronic small vessel ischemic disease. Authenticated and Electronically Signed by Krysten Rogel MD on 05/30/2025 05:29:08 PM    I have reviewed the patient's radiology reports.    Medication Review:     I have reviewed the patient's active and prn medications.     Problem List:      Seizure disorder      Assessment:    Seizure-like activity, POA  Acute UTI, POA  Sepsis ruled out  Elevated troponin, likely demand ischemia, POA  Advanced Alzheimer dementia  Depression  Diabetes mellitus  Hypertension  Hyperlipidemia  Hypothyroidism    Plan:    Seizure-like activity, seizure ruled out  - Neurology consulted :  impression is that her symptoms are likely infectious related  -MRI with no acute findings.  EEG to be done OP  Order neurology recommendations as below  - Hold on antiseizure medications unless EEG showing abnormal discharges  - Use Ativan 2 mg IV for tonic-clonic seizures lasting more than 3 minutes  - Delirium precautions   - Continue treatments of cystitis  -Per neurology, MRI showed no acute findings. Her  symptoms are more likely related to delirium secondary to UTI, delirium precautions initiated.  -I discussed with Colleen with neurology, recommending physical and Occupational Therapy.    Acute complicated cystitis  - Continue IV Rocephin  - Urine culture pending   - Blood cultures remains negative    Alzheimer dementia  Hypertension  Hyperlipidemia  -Continue home meds   - Blood pressure improved, continue to hold home antihypertensives for now until at least 24 to 48 hours stable blood pressure.     I have reviewed the copied text and it is accurate as of 6/2/2025     DVT Prophylaxis: Heparin  Code Status: DNI/DNR  Diet: Puréed nectar thickened  liquids.  Discharge Plan: Likely needs 1 to 2 days in the hospital, to [facility] for LTC     Beto Pierce MD  06/01/25  10:02 EDT    Dictated utilizing Dragon dictation.

## 2025-06-01 NOTE — PLAN OF CARE
Goal Outcome Evaluation:  Plan of Care Reviewed With: patient        Progress: improving  Outcome Evaluation: VSS; pt has eyes open multiple times during shift; able to take po meds crushed in applesauce; no acute events noted

## 2025-06-01 NOTE — PROGRESS NOTES
"DOS: 2025  NAME: Cassie Vasquez Jason   : 1955  PCP: Mariano Gordon DO  Chief Complaint   Patient presents with    Seizures       Chief complaint: Altered mental status, urosepsis, new seizure-like activity  Subjective: Patient seen and examined at the bedside via teleneurology video call with the help of the rounding nurse. No events overnight, she seems a little more awake compared to yesterday with open eyes.    Objective:  Vital signs: /74 (BP Location: Left arm, Patient Position: Lying)   Pulse 87   Temp 97.9 °F (36.6 °C) (Axillary)   Resp 16   Ht 172.7 cm (68\")   Wt 79.9 kg (176 lb 2.4 oz)   SpO2 94%   BMI 26.78 kg/m²    Gen: Lying on the bed, more awake compared to yesterday her eyes open, vitals reviewed  MS: oriented x0 which is her baseline, did not follow any commands, poor attention/concentration, nonverbal, unable to assess neglect.  CN: Resists eye opening, no obvious facial droop to supraorbital pain, unable to assess dysarthria  Motor: Spontaneously moving all extremities  Sensory exam: Withdrew to pain throughout    Laboratory results:  Lab Results   Component Value Date    GLUCOSE 110 (H) 2025    CALCIUM 9.2 2025     2025    K 4.1 2025    CO2 17.6 (L) 2025     (H) 2025    BUN 8.0 2025    CREATININE 1.09 (H) 2025    EGFRIFAFRI 77 2020    EGFRIFNONA 63 2020    BCR 7.3 2025    ANIONGAP 13.4 2025     Lab Results   Component Value Date    WBC 8.90 2025    HGB 12.3 2025    HCT 39.3 2025    MCV 88.9 2025     2025     Lab Results   Component Value Date    LDL 63 2020    LDL 70 2020    LDL 59 2019            Review of labs: Ionized calcium normal, magnesium normal, phosphorus slightly low at 2.3, urinary tract infection, glucose 174, BUN 17, creatinine 1.3, WBC 19.2, magnesium 2.2, TSH 3.72, procalcitonin negative, respiratory panel negative, " lactate was elevated on admission at 2.2 improved to 1.3 the next day.     Review and interpretation of imaging: No new brain image to review    Workup:  MRI brain with and without contrast. No acute findings.  Dilated ventricles felt likely from central atrophy, hydrocephalus and transependymal flow of cerebrospinal fluid felt less likely     I personally reviewed her CT scan of the head dated 5/29 read by radiologist as possible normal pressure hydrocephalus compared to prior CT head in 2020. I do not think this is a normal pressure hydrocephalus and I believe this is progression of her brain atrophy causing ex-vacuo brain dilatation as her prior CT head in 2020 also showed brain atrophy and ventricular dilatation although was to a lesser extent.      Diagnoses:  - New onset seizure-like activity likely provoked secondary to urinary tract infection and metabolic abnormalities  - Advanced Alzheimer dementia  - Abnormal MRI brain with advanced brain atrophy and dilated ventricles likely ex-vacuo dilatation from central volume loss, hydrocephalus and transependymal flow of cerebrospinal fluid felt less likely.  - Urinary tract infection with severe sepsis  - Delirium  - Altered mental status secondary to the above        PLAN:   - Routine EEG pending, per nursing staff could not be done today or over the weekend and only available on Tuesdays and Thursdays.  In this case this can be arranged as an outpatient.  - Hold on antiseizure medications unless EEG showing abnormal discharges  - Use Ativan 2 mg IV for tonic-clonic seizures lasting more than 3 minutes  - Seizure precautions as detailed below  - Up on the chair when possible  - PT/OT evaluation  - Treatment of UTI and metabolic abnormalities as per the primary  - Follow delirium precautions as detailed below        Discussed with Dr. Pierce via epic chat.      Delirium precautions:     1. Frequent reorientation, reminding patient not questioning patient   2.  Shades up during day/down at night   3. TV off except for soft music only   4. Familiar objects at bedside   5. Encourage friends/family to spend the night   6. Minimize anticholingeric medications   7. Minimize polypharmacy   8. Minimize restraints, includes lines and/or foleys and/or feeding tubes as able   9. Minimize overnight checks/vitals to encourage restful consistent sleep patterns   10. Out of bed during day as much as possible         Seizure Precautions:  ·    Do not drive 90 days. (this is to include: car, bicycle, or motorcycle)  ·    Do not drive operate dangerous equipment such as power tools, heavy machinery with moving parts, or an open flame.  ·    Do not swim alone (this would include a bathtub full of water is a small swimming pool).  ·    Avoid unsecured heights. (this is to include: scaffolding, ladders, trees, and roofs).     This was an audio and video visit enabled telemedicine encounter.     The consult was conducted in real time using interactive audio and video technology. Patient/Family was informed of the technology being used for this visit and agreed proceed. Patient located in hospital at [facility] and I'm the provider located at home/office based setting in [place], [place].

## 2025-06-02 VITALS
OXYGEN SATURATION: 95 % | RESPIRATION RATE: 16 BRPM | HEART RATE: 72 BPM | TEMPERATURE: 97.2 F | HEIGHT: 68 IN | SYSTOLIC BLOOD PRESSURE: 153 MMHG | DIASTOLIC BLOOD PRESSURE: 80 MMHG | WEIGHT: 176.15 LBS | BODY MASS INDEX: 26.7 KG/M2

## 2025-06-02 LAB
ANION GAP SERPL CALCULATED.3IONS-SCNC: 10.6 MMOL/L (ref 5–15)
BACTERIA SPEC AEROBE CULT: ABNORMAL
BUN SERPL-MCNC: 8 MG/DL (ref 8–23)
BUN/CREAT SERPL: 7 (ref 7–25)
CALCIUM SPEC-SCNC: 8.9 MG/DL (ref 8.6–10.5)
CHLORIDE SERPL-SCNC: 109 MMOL/L (ref 98–107)
CO2 SERPL-SCNC: 21.4 MMOL/L (ref 22–29)
CREAT SERPL-MCNC: 1.15 MG/DL (ref 0.57–1)
DEPRECATED RDW RBC AUTO: 45.4 FL (ref 37–54)
EGFRCR SERPLBLD CKD-EPI 2021: 51 ML/MIN/1.73
ERYTHROCYTE [DISTWIDTH] IN BLOOD BY AUTOMATED COUNT: 14.2 % (ref 12.3–15.4)
GLUCOSE SERPL-MCNC: 128 MG/DL (ref 65–99)
HCT VFR BLD AUTO: 41.1 % (ref 34–46.6)
HGB BLD-MCNC: 12.8 G/DL (ref 12–15.9)
MCH RBC QN AUTO: 27.7 PG (ref 26.6–33)
MCHC RBC AUTO-ENTMCNC: 31.1 G/DL (ref 31.5–35.7)
MCV RBC AUTO: 89 FL (ref 79–97)
PLATELET # BLD AUTO: 290 10*3/MM3 (ref 140–450)
PMV BLD AUTO: 10.8 FL (ref 6–12)
POTASSIUM SERPL-SCNC: 4.2 MMOL/L (ref 3.5–5.2)
RBC # BLD AUTO: 4.62 10*6/MM3 (ref 3.77–5.28)
SODIUM SERPL-SCNC: 141 MMOL/L (ref 136–145)
WBC NRBC COR # BLD AUTO: 9.26 10*3/MM3 (ref 3.4–10.8)

## 2025-06-02 PROCEDURE — 99238 HOSP IP/OBS DSCHRG MGMT 30/<: CPT | Performed by: FAMILY MEDICINE

## 2025-06-02 PROCEDURE — 85027 COMPLETE CBC AUTOMATED: CPT | Performed by: FAMILY MEDICINE

## 2025-06-02 PROCEDURE — 25010000002 CEFTRIAXONE PER 250 MG: Performed by: FAMILY MEDICINE

## 2025-06-02 PROCEDURE — 80048 BASIC METABOLIC PNL TOTAL CA: CPT | Performed by: FAMILY MEDICINE

## 2025-06-02 PROCEDURE — 25010000002 HEPARIN (PORCINE) PER 1000 UNITS: Performed by: FAMILY MEDICINE

## 2025-06-02 RX ORDER — CIPROFLOXACIN 250 MG/1
500 TABLET, FILM COATED ORAL 2 TIMES DAILY
Qty: 20 TABLET | Refills: 0 | Status: SHIPPED | OUTPATIENT
Start: 2025-06-02 | End: 2025-06-07

## 2025-06-02 RX ADMIN — MEMANTINE 10 MG: 5 TABLET ORAL at 08:44

## 2025-06-02 RX ADMIN — LEVOTHYROXINE SODIUM 50 MCG: 0.05 TABLET ORAL at 08:45

## 2025-06-02 RX ADMIN — SERTRALINE 50 MG: 50 TABLET, FILM COATED ORAL at 08:44

## 2025-06-02 RX ADMIN — ATORVASTATIN CALCIUM 10 MG: 10 TABLET, FILM COATED ORAL at 08:44

## 2025-06-02 RX ADMIN — SODIUM CHLORIDE 1000 MG: 9 INJECTION, SOLUTION INTRAVENOUS at 11:00

## 2025-06-02 RX ADMIN — HEPARIN SODIUM 5000 UNITS: 5000 INJECTION INTRAVENOUS; SUBCUTANEOUS at 08:45

## 2025-06-02 RX ADMIN — Medication 10 ML: at 08:45

## 2025-06-02 RX ADMIN — RIVASTIGMINE 1 PATCH: 13.3 PATCH TRANSDERMAL at 08:49

## 2025-06-02 NOTE — DISCHARGE INSTRUCTIONS
- Continue antibiotics at the nursing home until finished  -Follow-up as an outpatient with neurology and EEG to be scheduled as an outpatient.  -Follow-up with PCP in 2 to 3 days for recheck and continued care.

## 2025-06-02 NOTE — CASE MANAGEMENT/SOCIAL WORK
Case Management/Social Work    Patient Name:  Cassie Vasquez Jason  YOB: 1955  MRN: [id number]  Admit Date:  5/29/2025        09:07 EDT   Discharge Plan       Row Name 06/02/25 0906       Plan    Plan return to [facility]                        Electronically signed by:  Keya Villagran RN  06/02/25 09:07 EDT

## 2025-06-02 NOTE — DISCHARGE SUMMARY
[facility]      Name:  Cassie Gomez   Age:  70 y.o.  Sex:  female  :  1955  MRN:  [id number]   Visit Number:  [id number]    Admission Date:  2025  Date of Discharge:  2025  Primary Care Physician:  Mariano Gordon DO    Important issues to note:    -Discharged on Cipro 500 mg every 12 hours for 5 days.  -Follow-up with neurology, EEG as an outpatient  -Follow-up with PCP    Discharge Diagnoses:     Seizure-like activity, POA  Acute UTI, POA  Sepsis ruled out  Elevated troponin, likely demand ischemia, POA  Advanced Alzheimer dementia  Depression  Diabetes mellitus  Hypertension  Hyperlipidemia  Hypothyroidism    Problem List:     Active Hospital Problems    Diagnosis  POA    **Seizure disorder [G40.909]  Yes      Resolved Hospital Problems   No resolved problems to display.     Presenting Problem:    Chief Complaint   Patient presents with    Seizures      Consults:     Consulting Physician(s)         Provider   Role Specialty     Evgeny Macias MD      Consulting Physician Neurology          Procedures Performed:        History of presenting illness/Hospital Course:    Patient is 70 years old female with a past medical history of Alzheimer disease, depression, diabetes mellitus, hypertension, hyperlipidemia, hypothyroidism, presented to the ER from the nursing home with a chief complaint of seizure-like activity.  Patient was eating breakfast  prior to arrival and she suddenly started having convulsions that lasted around 3 minutes.  Witnessed per nursing home staff. History otherwise limited with her history of Alzheimer dementia.  All history was obtained per ER and nursing home report.  Patient appears alert but keeping her eyes closed and nonverbal upon exam.  Per son, patient has severe Alzheimer's disease, is not oriented to self.  Does require a feeder, however is mostly alert.  No history of seizure disorder.     On ER  evaluation, patient was tachycardic on arrival with a heart rate of 122 otherwise afebrile with stable blood pressure.  Workup in the ER was significant for HS Troponin of 28-36 with delta change of 8, creatinine 1.3, glucose 172, lactate 2.2, WBC 19.2.  Urinalysis consistent with infection.  Chest x-ray with no acute process.  Respiratory panel negative.  CT head without contrast showed Interval increase in size of ventricles which appears disproportionate to the sulci, normal pressure hydrocephalus not excluded.  Teleneurology Dr. Macias consulted and felt that her CT findings more consistent with brain atrophy, recommended admission for MRI and EEG.  Patient received Ativan 1 mg, total of 3.3 L of normal saline boluses and was started on Rocephin while in the ER.  Hospitalist consulted for admission, further management and treatment.  MRI showed no acute findings, EEG can be done outpatient per neurology.    Seizure-like activity, seizure ruled out  - Neurology consulted :  impression is that her symptoms are likely infectious related  -MRI with no acute findings.  EEG to be done OP  Order neurology recommendations as below  - Hold on antiseizure medications unless EEG showing abnormal discharges  - Use Ativan 2 mg IV for tonic-clonic seizures lasting more than 3 minutes  - Delirium precautions   - Continue treatments of cystitis  -Per neurology, MRI showed no acute findings. Her  symptoms are more likely related to delirium secondary to UTI, delirium precautions initiated.  -I discussed with Colleen with neurology, recommending physical and Occupational Therapy.  - I discussed with the patient's nurse at the nursing home, patient is currently at baseline mental status with Alzheimer disease.  Patient is fully awake and alert, she is nonverbal at baseline and giving social smiles.  -Follow-up with neurology as an outpatient.  EEG to be done as an outpatient.     Acute complicated cystitis, improved  -  Continued on IV Rocephin during hospitalization  - Urine culture grew Providencia stuartii, sensitive to Rocephin and Levaquin  - Blood cultures remains negative  -Switched on discharge to Cipro 500 mg every 12 hours for 5 days.     Alzheimer dementia  Hypertension  Hyperlipidemia  -Continue home meds     Patient was seen and examined on the day of discharge.  Patient resting comfortably in bed with no distress.  Appears.  Awake and alert and smiling.  I personally spoke with her nursing home nurse who is familiar with the patient from previously. Patient smiling and laughing when asked questions which I confirmed that her baseline mental status with dementia.  Patient with no pain or discomfort.  Patient tolerating p.o.      Patient to follow-up with neurology, EEG to be done as an outpatient.  Continued on Cipro to finish treatment of UTI.Patient to follow-up with PCP in 2 to 3 days for recheck and continued care. Clear return precautions discussed.  Patient verbalized understanding and agreeable to plan.  All questions were answered to the patient's satisfaction.  Patient has reached maximum medical improvement of inpatient hospital stay. Patient is discharged in stable condition.    Vital Signs:    Temp:  [97.2 °F (36.2 °C)-98.2 °F (36.8 °C)] 97.2 °F (36.2 °C)  Heart Rate:  [72] 72  Resp:  [16] 16  BP: (153-165)/(65-80) 153/80    Physical Exam:    General Appearance:  Alert and cooperative.  No acute distress.   Head:  Atraumatic and normocephalic.   Eyes: Conjunctivae and sclerae normal, no icterus. No pallor.   Ears:  Ears with no abnormalities noted.   Throat: No oral lesions, no thrush, oral mucosa moist.   Neck: Supple, trachea midline, no thyromegaly.   Back:   No tenderness to palpation.   Lungs:   Breath sounds heard bilaterally equally.  No crackles or wheezing. No Pleural rub or bronchial breathing.   Heart:  Normal S1 and S2, no murmur, no gallop, no rub. No JVD.   Abdomen:   Normal bowel sounds, no  masses, no organomegaly. Soft, nontender, nondistended, no rebound tenderness.   Extremities: Supple, no edema, no cyanosis, no clubbing.   Pulses: Pulses palpable bilaterally.   Skin: No bleeding or rash.   Neurologic: Alert and awake, at baseline with dementia.  No facial asymmetry. Moves all four limbs. No tremors.     Pertinent Lab Results:     Results from last 7 days   Lab Units 06/02/25  0829 06/01/25 2023 06/01/25  1005 05/31/25  0718 05/30/25  0620 05/29/25  0835   SODIUM mmol/L 141  --  139 142   < > 138   POTASSIUM mmol/L 4.2 3.8 3.5 4.1   < > 4.6   CHLORIDE mmol/L 109*  --  108* 111*   < > 101   CO2 mmol/L 21.4*  --  21.9* 17.6*   < > 23.1   BUN mg/dL 8.0  --  8.0 8.0   < > 17.0   CREATININE mg/dL 1.15*  --  1.12* 1.09*   < > 1.33*   CALCIUM mg/dL 8.9  --  8.7 9.2   < > 10.0   BILIRUBIN mg/dL  --   --   --   --   --  0.4   ALK PHOS U/L  --   --   --   --   --  107   ALT (SGPT) U/L  --   --   --   --   --  7   AST (SGOT) U/L  --   --   --   --   --  18   GLUCOSE mg/dL 128*  --  162* 110*   < > 174*    < > = values in this interval not displayed.     Results from last 7 days   Lab Units 06/02/25  0829 06/01/25  1005 05/31/25  0718   WBC 10*3/mm3 9.26 8.18 8.90   HEMOGLOBIN g/dL 12.8 12.2 12.3   HEMATOCRIT % 41.1 37.8 39.3   PLATELETS 10*3/mm3 290 283 266         Results from last 7 days   Lab Units 05/30/25  0620 05/29/25  1455 05/29/25  1008   HSTROP T ng/L 29* 36* 36*     Results from last 7 days   Lab Units 05/29/25  0835   PROBNP pg/mL 66.5                 Results from last 7 days   Lab Units 05/29/25  1027 05/29/25  0922   BLOODCX  No growth at 3 days  No growth at 3 days  --    URINECX   --  >100,000 CFU/mL Providencia stuartii*       Pertinent Radiology Results:    Imaging Results (All)       Procedure Component Value Units Date/Time    MRI Brain With & Without Contrast [[id number]] Collected: 05/30/25 1729     Updated: 05/30/25 1730    Narrative:      FINAL REPORT    TECHNIQUE:  null    CLINICAL  HISTORY:  AMS BEST IMAGES POSSIBLE AT THIS TIME. AMS REPORTED SEIZURE  SEVERAL DAYS PRIOR.    COMPARISON:  null    FINDINGS:  MR of the brain with and without contrast    Comparison: None    Findings:    Image quality is degraded by patient motion.    No acute infarction, hemorrhage, mass-effect or herniation. No abnormal enhancement.    The lateral and 3rd ventricles are enlarged. There is also prominence of the extra-axial spaces due to atrophy of the brain parenchyma.    Increased signal intensity is seen in the deep and periventricular white matter on the T2/FLAIR sequences, which most likely represents the sequela of chronic small vessel ischemic disease. Transependymal flow of cerebral spinal fluid is considered less   likely.    No extra-axial fluid collection or mass.    Unremarkable sella.    Intact flow voids.    Normal orbits.    Mucosal thickening in the right alveolar recess. Otherwise clear paranasal sinuses and mastoid air cells.    Unremarkable osseous structures.      Impression:      Impression:    No acute findings.    Enlarged lateral and 3rd ventricles could be secondary to mild hydrocephalus and/or parenchymal atrophy.    Increased signal intensity is seen in the deep and periventricular white matter on the T2/FLAIR sequences, which most likely represents the sequela of moderate chronic small vessel ischemic disease.    Authenticated and Electronically Signed by Krysten Rogel MD  on 05/30/2025 05:29:08 PM    CT Head Without Contrast [[id number]] Collected: 05/29/25 1004     Updated: 05/29/25 1008    Narrative:      PROCEDURE: CT HEAD WO CONTRAST-     HISTORY: seizure     COMPARISON: May 1, 2020..     TECHNIQUE: Multiple axial CT images were performed from the foramen  magnum to the vertex. Individualized dose reduction techniques using  automated exposure control or adjustment of mA and/or kV according to  the patient size were employed.     FINDINGS: There is moderate generalized cerebral  atrophy. The ventricles  are moderately enlarged, mildly increased from prior exam and appear  disproportionate to the prominent sulci. Normal pressure hydrocephalus  is not excluded. Mild small vessel ischemic disease noted. There is no  evidence of edema or hemorrhage.  No masses are identified. No  extra-axial fluid is seen. The paranasal sinuses are unremarkable.       Impression:      Interval increase in size of ventricles which appears  disproportionate to the sulci, normal pressure hydrocephalus not  excluded.              CTDI: 39.49 mGy  DLP:726.89 mGy.cm     This report was signed and finalized on 5/29/2025 10:06 AM by Trinity Sanchez MD.       XR Chest 1 View [[id number]] Collected: 05/29/25 0942     Updated: 05/29/25 0944    Narrative:      PROCEDURE: XR CHEST 1 VW-     HISTORY: ams     COMPARISON: None.     FINDINGS: The heart is normal in size. The lungs are clear. The  mediastinum is unremarkable. There is no pneumothorax.  There are no  acute osseous abnormalities. Apical lordotic positioning noted.        Impression:      No acute cardiopulmonary process.              This report was signed and finalized on 5/29/2025 9:42 AM by Trinity Sanchez MD.               Echo:      Condition on Discharge:      Stable.    Code status during the hospital stay:    Code Status and Medical Interventions: No CPR (Do Not Attempt to Resuscitate); Limited Support; No intubation (DNI)   Ordered at: 05/29/25 1425     Code Status (Patient has no pulse and is not breathing):    No CPR (Do Not Attempt to Resuscitate)     Medical Interventions (Patient has pulse or is breathing):    Limited Support     Medical Intervention Limits:    No intubation (DNI)     Discharge Disposition:    Skilled Nursing Facility (DC - External)    Discharge Medications:       Discharge Medications        New Medications        Instructions Start Date   ciprofloxacin 250 MG tablet  Commonly known as: Cipro   500 mg, Oral, 2 Times Daily              Continue These Medications        Instructions Start Date   Dulcolax 10 MG suppository  Generic drug: bisacodyl   10 mg, 2 Times Daily PRN      glipizide 5 MG tablet  Commonly known as: GLUCOTROL   1 tablet, Daily      glucose blood test strip   1 each, Other, Daily, Use as instructed      levothyroxine 50 MCG tablet  Commonly known as: SYNTHROID, LEVOTHROID   50 mcg, Oral, Daily      losartan 100 MG tablet  Commonly known as: COZAAR   100 mg, Oral, Daily      lovastatin 40 MG tablet  Commonly known as: MEVACOR   40 mg, Oral, Nightly      memantine 10 MG tablet  Commonly known as: NAMENDA   10 mg, Oral, 2 Times Daily      PHOSPHATE ENEMA RE   1 Application, Rectal, 2 Times Daily PRN      PRESERVISION AREDS 2+MULTI VIT PO   1 tablet, Oral, Daily      QUEtiapine 25 MG tablet  Commonly known as: SEROquel   50 mg, Oral, Nightly      rivastigmine 13.3 MG/24HR patch  Commonly known as: EXELON   1 patch, Transdermal, Daily      rivastigmine 3 MG capsule  Commonly known as: EXELON   3 mg, 2 Times Daily      senna 8.6 MG tablet  Commonly known as: SENOKOT   1 tablet, 2 Times Daily PRN      Vitamin B-12 500 MCG sublingual tablet   1 tablet, Daily      vitamin D3 125 MCG (5000 UT) capsule capsule   5,000 Units, Daily             ASK your doctor about these medications        Instructions Start Date   sertraline 50 MG tablet  Commonly known as: ZOLOFT   50 mg, Oral, Daily      SF 5000 Plus 1.1 % cream  Generic drug: Sodium Fluoride   See Admin Instructions             Discharge Diet:   Puréed nectar thickened liquids.     Activity at Discharge:   As tolerated    Follow-up Appointments:    Additional Instructions for the Follow-ups that You Need to Schedule       Ambulatory Referral to Neurology   As directed             Contact information for follow-up providers       Mariano Gordon, DO Follow up in 3 day(s).    Specialty: Family Medicine  Contact information:  [place] [address] [phone]                Nahomy Medel, VALENTINA Follow up in 1 week(s).    Specialties: Neurology, Family Medicine  Contact information:  [address]  [place] [place] [address]  [phone]               Nahomy Medel, VALENTINA .    Specialties: Neurology, Family Medicine  Contact information:  [address]  [place] [place] [address]  [phone]                       Contact information for after-discharge care       [facility] .    Service: Nursing Home  Contact information:  [address] [address] [phone]                                 No future appointments.  Test Results Pending at Discharge:    Pending Results       None                 Beto Pierce MD  06/02/25  09:45 EDT    Time: I spent 28 minutes on this discharge activity which included: face-to-face encounter with the patient, reviewing the data in the system, coordination of the care with the nursing staff as well as consultants, documentation, and entering orders.     Dictated utilizing Dragon dictation.

## 2025-06-02 NOTE — PLAN OF CARE
Goal Outcome Evaluation:           Progress: no change  Outcome Evaluation: vss.

## 2025-06-02 NOTE — NURSING NOTE
Report called to [facility] Nurse Lavinia. States she is familiar with the patient, updates given from treatment here at [facility] and informed of antibx for 5 more days. Voiced understanding, callback number provided if questions arise.

## 2025-06-02 NOTE — CASE MANAGEMENT/SOCIAL WORK
Case Management Discharge Note                Selected Continued Care - Admitted Since 5/29/2025       Destination Coordination complete.      Service Provider Services Address Phone Fax Patient Preferred    [facility] [address] [address] [phone] --              Durable Medical Equipment    No services have been selected for the patient.                Dialysis/Infusion    No services have been selected for the patient.                Home Medical Care    No services have been selected for the patient.                Therapy    No services have been selected for the patient.                Community Resources    No services have been selected for the patient.                Community & DME    No services have been selected for the patient.                    Transportation Services  Ambulance: [facility]    Final Discharge Disposition Code: 04 - intermediate care facility

## 2025-06-03 ENCOUNTER — NURSING HOME (OUTPATIENT)
Age: 70
End: 2025-06-03
Payer: MEDICARE

## 2025-06-03 VITALS
HEIGHT: 66 IN | BODY MASS INDEX: 28.22 KG/M2 | DIASTOLIC BLOOD PRESSURE: 71 MMHG | RESPIRATION RATE: 18 BRPM | TEMPERATURE: 98.3 F | OXYGEN SATURATION: 96 % | WEIGHT: 175.6 LBS | SYSTOLIC BLOOD PRESSURE: 140 MMHG | HEART RATE: 70 BPM

## 2025-06-03 DIAGNOSIS — G40.909 SEIZURE DISORDER: ICD-10-CM

## 2025-06-03 DIAGNOSIS — R54 AGE-RELATED PHYSICAL DEBILITY: Primary | ICD-10-CM

## 2025-06-03 DIAGNOSIS — Z79.899 MEDICATION MANAGEMENT: ICD-10-CM

## 2025-06-03 LAB
BACTERIA SPEC AEROBE CULT: NORMAL
BACTERIA SPEC AEROBE CULT: NORMAL

## 2025-06-03 NOTE — LETTER
Nursing Home History and Physical Note      Mariano Gordon DO  []  VALENTINA Novak  []  VALENTINA Nolen [x]  [address] [place] [address]  Phone: [phone]  Fax: [phone] Mabel Aiken MD  []  Aaron Ferreira DO  []  Kat Colbert PA-C  []  [address] [place] [address]  Phone: [phone]  Fax: [phone]     PATIENT NAME: Cassie Gomez                                                                          YOB: 1955            DATE OF SERVICE: 6/3/2025    FACILITY:   [] [facility]    [] [place]    [x] [facility]     [] [facility]    [] Other     ______________________________________________________________________      CHIEF COMPLAINT:   Readmission to facility  Reviewed medications  History of UTI       HISTORY OF PRESENT ILLNESS:   Resident seen to review hospital discharge summary and medications.   She has a follow up appointment with neurology due to suspected seizure like activity. She continues Cipro 500 mg q 12 hrs x 5 days due to acute UTI at hospital. She has DMII, hypothyroidism, and alzheimer dementia. She is resting in bed, nursing report her confusion and mental status is at baseline. She is calm and cooperative at this time no distress noted.     PAST MEDICAL & SURGICAL HISTORY:   Past Medical History:   Diagnosis Date   • Alzheimer disease    • Colon polyp    • Depression    • Diabetes mellitus    • Essential hypertension 2018   • H/O bone density study     normal   • Memory loss    • Mixed hyperlipidemia 2017   • Seizure 2025   • Thyroid disease    • Thyroid enlargement       Past Surgical History:   Procedure Laterality Date   • ADENOIDECTOMY     •  SECTION  1978   • TONSILLECTOMY            MEDICATIONS:  I have reviewed and reconciled the patients medication list in the patients chart at the skilled nursing facility today.      Allergies   Allergen Reactions   • Bee Venom Anaphylaxis   • Ace  "Inhibitors Cough   • Venlafaxine Other (See Comments)     insomnia         Social History     Socioeconomic History   • Marital status:    Tobacco Use   • Smoking status: Never   • Smokeless tobacco: Never   Vaping Use   • Vaping status: Never Used   Substance and Sexual Activity   • Alcohol use: No   • Drug use: No   • Sexual activity: Not Currently     Partners: Male     Birth control/protection: Post-menopausal       Family History   Problem Relation Age of Onset   • Diabetes Mother    • Heart disease Mother    • Heart attack Mother    • COPD Mother    • Dementia Father    • Diabetes Father    • Stroke Father         In his Left eye   • Squamous cell carcinoma Brother         oral       Review of Systems   Unable to perform ROS: Dementia   Neurological:  Positive for speech difficulty and weakness.         PHYSICAL EXAMINATION:   VITAL SIGNS:   Vitals:    06/03/25 1230   BP: 140/71   Pulse: 70   Resp: 18   Temp: 98.3 °F (36.8 °C)   SpO2: 96%   Weight: 79.7 kg (175 lb 9.6 oz)   Height: 167.6 cm (66\")         Physical Exam  Vitals and nursing note reviewed.   Constitutional:       Appearance: Normal appearance.   Eyes:      Conjunctiva/sclera: Conjunctivae normal.   Cardiovascular:      Rate and Rhythm: Normal rate and regular rhythm.   Pulmonary:      Effort: Pulmonary effort is normal. No respiratory distress.      Breath sounds: Normal breath sounds.   Abdominal:      General: Abdomen is flat. Bowel sounds are normal. There is no distension.      Palpations: Abdomen is soft.      Tenderness: There is no abdominal tenderness.   Neurological:      Mental Status: She is alert. She is confused.   Psychiatric:         Mood and Affect: Mood normal. Affect is flat.         Behavior: Behavior is withdrawn.         Cognition and Memory: Cognition is impaired. Memory is impaired.         RECORDS REVIEW:   I have reviewed and interpreted the following medications obtained at the time of the visit today. "     ASSESSMENT     Assessment & Plan  Age-related physical debility         Seizure disorder         Medication management       Awake and alert, dementia present  She is nonverbal, smiling and laughing  Continues cipro 500 mg q 12 hrs x 5 days for acute complicated cystitis  Tolerating antibiotics well  Continues medications at facility   Current Outpatient Medications on File Prior to Visit   Medication Sig Dispense Refill   • bisacodyl (Dulcolax) 10 MG suppository Insert 1 suppository into the rectum 2 (Two) Times a Day As Needed for Constipation.     • Cholecalciferol (VITAMIN D3) 5000 UNITS capsule capsule Take 1 capsule by mouth Daily.     • ciprofloxacin (Cipro) 250 MG tablet Take 2 tablets by mouth 2 (Two) Times a Day for 5 days. 20 tablet 0   • Cyanocobalamin (VITAMIN B-12) 500 MCG sublingual tablet Take 1 tablet by mouth Daily.     • glipizide (GLUCOTROL) 5 MG tablet Take 1 tablet by mouth Daily.     • glucose blood test strip 1 each by Other route Daily. Use as instructed 100 each 1   • levothyroxine (SYNTHROID, LEVOTHROID) 50 MCG tablet TAKE 1 TABLET BY MOUTH DAILY 90 tablet 0   • losartan (COZAAR) 100 MG tablet TAKE 1 TABLET BY MOUTH DAILY 90 tablet 0   • lovastatin (MEVACOR) 40 MG tablet Take 1 tablet by mouth Every Night. 90 tablet 1   • memantine (NAMENDA) 10 MG tablet Take 1 tablet by mouth 2 (Two) Times a Day. 180 tablet 3   • Multiple Vitamins-Minerals (PRESERVISION AREDS 2+MULTI VIT PO) Take 1 tablet by mouth Daily.     • QUEtiapine (SEROquel) 25 MG tablet Take 2 tablets by mouth Every Night. (Patient taking differently: Take 1 tablet by mouth Every Night.) 180 tablet 3   • rivastigmine (EXELON) 13.3 MG/24HR patch Place 1 patch on the skin as directed by provider Daily. 30 patch 11   • rivastigmine (EXELON) 3 MG capsule Take 1 capsule by mouth 2 (Two) Times a Day.     • senna 8.6 MG tablet Take 1 tablet by mouth 2 (Two) Times a Day As Needed for Constipation.     • sertraline (ZOLOFT) 50 MG  tablet Take 1 tablet by mouth Daily. (Patient not taking: Reported on 5/30/2025) 90 tablet 3   • SF 5000 PLUS 1.1 % cream See Admin Instructions. (Patient not taking: Reported on 5/30/2025)     • Sodium Phosphates (PHOSPHATE ENEMA RE) Insert 1 Application into the rectum 2 (Two) Times a Day As Needed.       No current facility-administered medications on file prior to visit.       Will follow up with neurology  MRI and CT of head completed at hospital  Nursing to monitor changes  Notify MD NP changes      [x]  Discussed Patient in detail with nursing/staff, addressed all needs today.     []  Plan of Care Reviewed   []  PT/OT Reviewed   []  Order Changes  []  Discharge Plans Reviewed  [x]  Advance Directive on file with Nursing Home.   [x]  POA on file with Nursing Home.   []  Code Status listed:   []  Full Code   []  DNR          Total face to face time spent with patient 25 minutes. Of which 15 minutes were in counseling the patient and family.     VALENTINA Nolen

## 2025-06-04 NOTE — PROGRESS NOTES
Nursing Home History and Physical Note      Mariano Gordon DO  []  VALENTINA Novak  []  VALENTINA Nolen [x]  [address] [place] [address]  Phone: [phone]  Fax: [phone] Mabel Aiken MD  []  Aaron Ferreira DO  []  Kat Colbert PA-C  []  [address] [place] [address]  Phone: [phone]  Fax: [phone]     PATIENT NAME: Cassie Gomez                                                                          YOB: 1955            DATE OF SERVICE: 6/3/2025    FACILITY:   [] [place]    [] [place]    [x] [facility]     [] [facility]    [] Other     ______________________________________________________________________      CHIEF COMPLAINT:   Readmission to facility  Reviewed medications  History of UTI       HISTORY OF PRESENT ILLNESS:   Resident seen to review hospital discharge summary and medications.   She has a follow up appointment with neurology due to suspected seizure like activity. She continues Cipro 500 mg q 12 hrs x 5 days due to acute UTI at hospital. She has DMII, hypothyroidism, and alzheimer dementia. She is resting in bed, nursing report her confusion and mental status is at baseline. She is calm and cooperative at this time no distress noted.     PAST MEDICAL & SURGICAL HISTORY:   Past Medical History:   Diagnosis Date    Alzheimer disease     Colon polyp     Depression     Diabetes mellitus 2000    Essential hypertension 2018    H/O bone density study     normal    Memory loss     Mixed hyperlipidemia 2017    Seizure 2025    Thyroid disease     Thyroid enlargement       Past Surgical History:   Procedure Laterality Date    ADENOIDECTOMY       SECTION  1978    TONSILLECTOMY            MEDICATIONS:  I have reviewed and reconciled the patients medication list in the patients chart at the skilled nursing facility today.      Allergies   Allergen Reactions    Bee Venom Anaphylaxis    Ace Inhibitors Cough     "Venlafaxine Other (See Comments)     insomnia         Social History     Socioeconomic History    Marital status:    Tobacco Use    Smoking status: Never    Smokeless tobacco: Never   Vaping Use    Vaping status: Never Used   Substance and Sexual Activity    Alcohol use: No    Drug use: No    Sexual activity: Not Currently     Partners: Male     Birth control/protection: Post-menopausal       Family History   Problem Relation Age of Onset    Diabetes Mother     Heart disease Mother     Heart attack Mother     COPD Mother     Dementia Father     Diabetes Father     Stroke Father         In his Left eye    Squamous cell carcinoma Brother         oral       Review of Systems   Unable to perform ROS: Dementia   Neurological:  Positive for speech difficulty and weakness.         PHYSICAL EXAMINATION:   VITAL SIGNS:   Vitals:    06/03/25 1230   BP: 140/71   Pulse: 70   Resp: 18   Temp: 98.3 °F (36.8 °C)   SpO2: 96%   Weight: 79.7 kg (175 lb 9.6 oz)   Height: 167.6 cm (66\")         Physical Exam  Vitals and nursing note reviewed.   Constitutional:       Appearance: Normal appearance.   Eyes:      Conjunctiva/sclera: Conjunctivae normal.   Cardiovascular:      Rate and Rhythm: Normal rate and regular rhythm.   Pulmonary:      Effort: Pulmonary effort is normal. No respiratory distress.      Breath sounds: Normal breath sounds.   Abdominal:      General: Abdomen is flat. Bowel sounds are normal. There is no distension.      Palpations: Abdomen is soft.      Tenderness: There is no abdominal tenderness.   Neurological:      Mental Status: She is alert. She is confused.   Psychiatric:         Mood and Affect: Mood normal. Affect is flat.         Behavior: Behavior is withdrawn.         Cognition and Memory: Cognition is impaired. Memory is impaired.         RECORDS REVIEW:   I have reviewed and interpreted the following medications obtained at the time of the visit today.     ASSESSMENT     Assessment & " Plan  Age-related physical debility         Seizure disorder         Medication management       Awake and alert, dementia present  She is nonverbal, smiling and laughing  Continues cipro 500 mg q 12 hrs x 5 days for acute complicated cystitis  Tolerating antibiotics well  Continues medications at facility   Current Outpatient Medications on File Prior to Visit   Medication Sig Dispense Refill    bisacodyl (Dulcolax) 10 MG suppository Insert 1 suppository into the rectum 2 (Two) Times a Day As Needed for Constipation.      Cholecalciferol (VITAMIN D3) 5000 UNITS capsule capsule Take 1 capsule by mouth Daily.      ciprofloxacin (Cipro) 250 MG tablet Take 2 tablets by mouth 2 (Two) Times a Day for 5 days. 20 tablet 0    Cyanocobalamin (VITAMIN B-12) 500 MCG sublingual tablet Take 1 tablet by mouth Daily.      glipizide (GLUCOTROL) 5 MG tablet Take 1 tablet by mouth Daily.      glucose blood test strip 1 each by Other route Daily. Use as instructed 100 each 1    levothyroxine (SYNTHROID, LEVOTHROID) 50 MCG tablet TAKE 1 TABLET BY MOUTH DAILY 90 tablet 0    losartan (COZAAR) 100 MG tablet TAKE 1 TABLET BY MOUTH DAILY 90 tablet 0    lovastatin (MEVACOR) 40 MG tablet Take 1 tablet by mouth Every Night. 90 tablet 1    memantine (NAMENDA) 10 MG tablet Take 1 tablet by mouth 2 (Two) Times a Day. 180 tablet 3    Multiple Vitamins-Minerals (PRESERVISION AREDS 2+MULTI VIT PO) Take 1 tablet by mouth Daily.      QUEtiapine (SEROquel) 25 MG tablet Take 2 tablets by mouth Every Night. (Patient taking differently: Take 1 tablet by mouth Every Night.) 180 tablet 3    rivastigmine (EXELON) 13.3 MG/24HR patch Place 1 patch on the skin as directed by provider Daily. 30 patch 11    rivastigmine (EXELON) 3 MG capsule Take 1 capsule by mouth 2 (Two) Times a Day.      senna 8.6 MG tablet Take 1 tablet by mouth 2 (Two) Times a Day As Needed for Constipation.      sertraline (ZOLOFT) 50 MG tablet Take 1 tablet by mouth Daily. (Patient not  taking: Reported on 5/30/2025) 90 tablet 3    SF 5000 PLUS 1.1 % cream See Admin Instructions. (Patient not taking: Reported on 5/30/2025)      Sodium Phosphates (PHOSPHATE ENEMA RE) Insert 1 Application into the rectum 2 (Two) Times a Day As Needed.       No current facility-administered medications on file prior to visit.       Will follow up with neurology  MRI and CT of head completed at hospital  Nursing to monitor changes  Notify MD NP changes      [x]  Discussed Patient in detail with nursing/staff, addressed all needs today.     []  Plan of Care Reviewed   []  PT/OT Reviewed   []  Order Changes  []  Discharge Plans Reviewed  [x]  Advance Directive on file with Nursing Home.   [x]  POA on file with Nursing Home.   []  Code Status listed:   []  Full Code   []  DNR          Total face to face time spent with patient 25 minutes. Of which 15 minutes were in counseling the patient and family.     Cherelle Hansen APRN

## 2025-07-02 ENCOUNTER — NURSING HOME (OUTPATIENT)
Age: 70
End: 2025-07-02
Payer: MEDICARE

## 2025-07-02 VITALS
DIASTOLIC BLOOD PRESSURE: 73 MMHG | RESPIRATION RATE: 16 BRPM | TEMPERATURE: 97.7 F | WEIGHT: 171.8 LBS | BODY MASS INDEX: 27.61 KG/M2 | HEART RATE: 71 BPM | SYSTOLIC BLOOD PRESSURE: 125 MMHG | HEIGHT: 66 IN | OXYGEN SATURATION: 97 %

## 2025-07-02 DIAGNOSIS — F02.C3 SEVERE LATE ONSET ALZHEIMER'S DEMENTIA WITH MOOD DISTURBANCE: ICD-10-CM

## 2025-07-02 DIAGNOSIS — Z74.09 IMPAIRED MOBILITY AND ADLS: Primary | Chronic | ICD-10-CM

## 2025-07-02 DIAGNOSIS — I10 ESSENTIAL HYPERTENSION: ICD-10-CM

## 2025-07-02 DIAGNOSIS — E03.9 ACQUIRED HYPOTHYROIDISM: ICD-10-CM

## 2025-07-02 DIAGNOSIS — G40.909 SEIZURE DISORDER: Chronic | ICD-10-CM

## 2025-07-02 DIAGNOSIS — G30.1 SEVERE LATE ONSET ALZHEIMER'S DEMENTIA WITH MOOD DISTURBANCE: ICD-10-CM

## 2025-07-02 DIAGNOSIS — R54 AGE-RELATED PHYSICAL DEBILITY: ICD-10-CM

## 2025-07-02 DIAGNOSIS — E11.9 TYPE 2 DIABETES MELLITUS WITHOUT COMPLICATION, WITHOUT LONG-TERM CURRENT USE OF INSULIN: ICD-10-CM

## 2025-07-02 DIAGNOSIS — Z78.9 IMPAIRED MOBILITY AND ADLS: Primary | Chronic | ICD-10-CM

## 2025-07-02 NOTE — PROGRESS NOTES
Nursing Home Progress Note        Mariano Gordon DO [x]  VALENTINA Novak []  [address] [place] [address]  Phone: [phone]  Fax: [phone] Mabel Aiken MD []  Aaron Ferreira DO []  [address] [place] [address]  Phone: [phone]  Fax: [phone]     PATIENT NAME: Cassie Gomez                                                                          YOB: 1955           DATE OF SERVICE: 7/2/2025  FACILITY: []  [place]  [] [place]  [x]  [facility]  [] [facility]  []  Other ______________________________________________________________________     CHIEF COMPLAINT:  Impaired mobility and ADLs/Alzheimer's dementia/hypothyroidism/hypertension/diabetes mellitus/dyslipidemia/age-related physical debility/irritable bowel syndrome      HISTORY OF PRESENT ILLNESS:   [x]  Follow Up visit for coordination of long term care issues and chronic medical management of Diagnoses and all orders for this visit:    1. Impaired mobility and ADLs (Primary)    2. Age-related physical debility    3. Severe late onset Alzheimer's dementia with mood disturbance    4. Seizure disorder    5. Acquired hypothyroidism    6. Type 2 diabetes mellitus without complication, without long-term current use of insulin    7. Essential hypertension    Nursing/staff reports the patient been receptive to supportive care, no new falls or injuries reported.    No acute behavioral changes, although does demonstrate mood disturbance associated with advanced Alzheimer's dementia.    No reports of cyclical/persistent hypoglycemia.    Vital signs have been stable, no complaints of chest pain.    No nutritional/hydration deficits noted.      PAST MEDICAL & SURGICAL HISTORY:   Past Medical History:   Diagnosis Date    Alzheimer disease     Colon polyp     Depression     Diabetes mellitus 2000    Essential hypertension 05/24/2018    H/O bone density study 2011    normal    Memory loss     Mixed  hyperlipidemia 2017    Seizure 2025    Thyroid disease     Thyroid enlargement       Past Surgical History:   Procedure Laterality Date    ADENOIDECTOMY       SECTION  1978    TONSILLECTOMY           MEDICATIONS:  I have reviewed and reconciled the patients medication list in the patients chart at the skilled nursing facility today.      ALLERGIES:    Allergies   Allergen Reactions    Bee Venom Anaphylaxis    Ace Inhibitors Cough    Venlafaxine Other (See Comments)     insomnia         SOCIAL HISTORY:    Social History     Socioeconomic History    Marital status:    Tobacco Use    Smoking status: Never    Smokeless tobacco: Never   Vaping Use    Vaping status: Never Used   Substance and Sexual Activity    Alcohol use: No    Drug use: No    Sexual activity: Not Currently     Partners: Male     Birth control/protection: Post-menopausal       FAMILY HISTORY:    Family History   Problem Relation Age of Onset    Diabetes Mother     Heart disease Mother     Heart attack Mother     COPD Mother     Dementia Father     Diabetes Father     Stroke Father         In his Left eye    Squamous cell carcinoma Brother         oral       REVIEW OF SYSTEMS:    Review of Systems  Appetite: Fair [x]   Good []   Poor []   Weight Loss []  [x]  Weight Stable   Unavoidable Weight Loss []  Tolerating Tube Feeding []    Supplements Provided []   Patient is a poor historian given her advanced dementia, review of systems is obtained on direct consultation with her treating staff/nurse, as well as review of records.    PHYSICAL EXAMINATION:   VITAL SIGNS:   Vitals:    25 0934   BP: 125/73   Pulse: 71   Resp: 16   Temp: 97.7 °F (36.5 °C)   SpO2: 97%     BMI is >= 25 and <30. (Overweight) The following options were offered after discussion;: nutrition counseling/recommendations      Physical Exam    General Appearance:  [x]  Alert   [x]  Oriented x person  [x]  No acute distress     [x]  Confused  []   Disoriented   []  Comatose   Head:  Atraumatic and normocephalic, without obvious abnormality.   Eyes:         PERRLA, conjunctivae and sclerae normal, no Icterus. No pallor. Extra-occular movements are within normal limits.   Ears:  Ears appear intact with no abnormalities noted.   Throat: No oral lesions, no thrush, oral mucosa moist.   Neck: Supple, trachea midline, no thyromegaly, no carotid bruit.   Back:   No kyphoscoliosis. No tenderness to palpation.   Lungs:   Chest shape is normal. Breath sounds heard bilaterally equally.  No wheezing.  Audible air exchange noted all lung fields.   Heart:  Normal S1 and S2, no murmur, no gallop, no rub. No JVD.   Abdomen:   Normal bowel sounds, no masses, no organomegaly. Soft, non-tender, non-distended, no guarding    Extremities: Moves all extremities; without edema, cyanosis or clubbing.  Frail build.  Poor core strength/stability.   Pulses: Pulses palpable and equal bilaterally.   Skin: No bleeding or rash.  Generalized dry skin noted.  Age-related atrophy of skin.   Neurologic: [x] Normal speech []  Normal mental status    [x] Cranial nerves II through XII intact   [x]  No anosmia [x]  DTR 2+ [x]  Proprioception intact  [x]  No focal motor/sensory deficits      Psych/Mood:                    [x]  No acute changes []  Depressed        Urinary:      [x]  Continent  [x]  Incontinent, at times []  Retention  []  F/C      []  UTI w/treatment in progress         ASSESSMENT     Diagnoses and all orders for this visit:    1. Impaired mobility and ADLs (Primary)    2. Age-related physical debility    3. Severe late onset Alzheimer's dementia with mood disturbance    4. Seizure disorder    5. Acquired hypothyroidism    6. Type 2 diabetes mellitus without complication, without long-term current use of insulin    7. Essential hypertension          PLAN  Continuation of supportive care for mobilization/transfers, fall precautions in place given advanced age, impaired mobility and  advanced dementia.    Continue thyroid supplementation, periodic thyroid function studies with dose adjustment when indicated.    Continue current antiepileptic treatment regimen.    Continue blood glucose monitoring, changes to diabetic treatment regimen, made when needed to maximize blood glucose control and minimize hypoglycemic episodes.    Vital signs demonstrate hemodynamic stability, blood pressure is at goal.  No findings of unstable angina or increased work of breathing.    [x]  Discussed Patient in detail with nursing/staff, addressed all needs today.     [x]  Plan of Care Reviewed   []  PT/OT Reviewed   []  Order Changes  []  Discharge Plans Reviewed   []  Code Status Changes    I spent 35 minutes caring for Cassie on this date of service. This time includes time spent by me in the following activities:preparing for the visit, performing a medically appropriate examination and/or evaluation , counseling and educating the patient/family/caregiver, ordering medications, tests, or procedures, documenting information in the medical record, and care coordination    I confirm accuracy of unchanged data/findings which have been carried forward from previous visit, as well as I have updated appropriately those that have changed.         Mariano Gordon DO  7/2/2025